# Patient Record
Sex: FEMALE | Race: WHITE | NOT HISPANIC OR LATINO | Employment: OTHER | URBAN - METROPOLITAN AREA
[De-identification: names, ages, dates, MRNs, and addresses within clinical notes are randomized per-mention and may not be internally consistent; named-entity substitution may affect disease eponyms.]

---

## 2017-02-17 ENCOUNTER — OFFICE VISIT (OUTPATIENT)
Dept: SURGERY | Facility: CLINIC | Age: 67
End: 2017-02-17

## 2017-02-17 VITALS
HEART RATE: 87 BPM | WEIGHT: 183 LBS | HEIGHT: 58 IN | OXYGEN SATURATION: 97 % | TEMPERATURE: 97.9 F | DIASTOLIC BLOOD PRESSURE: 78 MMHG | BODY MASS INDEX: 38.41 KG/M2 | RESPIRATION RATE: 20 BRPM | SYSTOLIC BLOOD PRESSURE: 126 MMHG

## 2017-02-17 DIAGNOSIS — R10.32 LEFT LOWER QUADRANT PAIN: ICD-10-CM

## 2017-02-17 DIAGNOSIS — R19.7 DIARRHEA, UNSPECIFIED TYPE: Primary | ICD-10-CM

## 2017-02-17 PROCEDURE — 99213 OFFICE O/P EST LOW 20 MIN: CPT | Performed by: COLON & RECTAL SURGERY

## 2017-02-17 RX ORDER — HYOSCYAMINE SULFATE 0.125 MG
125 TABLET ORAL EVERY 4 HOURS PRN
Qty: 60 TABLET | Refills: 1 | Status: SHIPPED | OUTPATIENT
Start: 2017-02-17 | End: 2017-08-01

## 2017-02-17 RX ORDER — ALLOPURINOL 300 MG/1
TABLET ORAL DAILY
COMMUNITY
Start: 2017-02-07

## 2017-02-17 RX ORDER — CHOLESTYRAMINE LIGHT 4 G/5.7G
4 POWDER, FOR SUSPENSION ORAL DAILY
Qty: 30 PACKET | Refills: 2 | Status: SHIPPED | OUTPATIENT
Start: 2017-02-17 | End: 2017-08-01

## 2017-02-17 NOTE — PROGRESS NOTES
"Sravanthi Burns is a 66 y.o. female in for follow up of Diarrhea, unspecified type    Left lower quadrant pain    Po c/o LLQ abd pain: \"feels like somebody takes an ice pick and stabs me as hard as they can\"  Last Friday lasted all day  Had temp to 99.4 all day  The next day, spot felt tender on the inside    Pt has not noted any correlation with foods    Better when she lays down; worse with movement    Pt cannot estimate how frequently abd pain occurs: states very sporadic    Has not been taking welchol: could not swallow pills    States diarrhea has been better    Does not like to take imodium, as she is concerned with becoming constipated    Today, stools are formed    Stools vacillate back and forth between formed and loose    On days when she has symptoms, has 5-10 loose BMs     Conversely, can go 3-4 days without a BM    STates she goes through this cycle every week    No blood    C/o lots of gas.  Has not tried gas-x    Cannot always tell if she is going to pass gas or have BM    Takes daily gummy fiber, which she states have helped    Has not seen gastroenterologist    Visit Vitals   • /78 (BP Location: Left arm, Patient Position: Sitting, Cuff Size: Adult)   • Pulse 87   • Temp 97.9 °F (36.6 °C)   • Resp 20   • Ht 58\" (147.3 cm)   • Wt 183 lb (83 kg)   • SpO2 97%   • BMI 38.25 kg/m2     Body mass index is 38.25 kg/(m^2).      PE:  Physical Exam   Constitutional: She appears well-developed. No distress.   HENT:   Head: Normocephalic and atraumatic.   Abdominal: Soft. She exhibits no distension.   Musculoskeletal: Normal range of motion.   Neurological: She is alert.   Psychiatric: Thought content normal.         Assessment:   1. Diarrhea, unspecified type    2. Left lower quadrant pain         Plan:    -Rx Levsin for IBD symptoms  -change welchol to rx cholestyramine: instructed pt to discuss timing of cholestyramine and thyroid medication with pharmacist  -call, come in, or go to ED if abd pain " returns or does not improve    RTC 2 months    Scribed for Josi Dela Cruz MD by Janie Ballard PA-C. 2/17/2017  11:47 AM  This patient was evaluated by me, recommendations made, documentation reviewed, edited, and revised by me, Josi Dela Cruz MD      EMR Dragon/Transcription disclaimer:   Much of this encounter note is an electronic transcription/translation of spoken language to printed text. The electronic translation of spoken language may permit erroneous, or at times, nonsensical words or phrases to be inadvertently transcribed; Although I have reviewed the note for such errors, some may still exist. Lasted all day 1 week ago

## 2017-02-20 ENCOUNTER — TRANSCRIBE ORDERS (OUTPATIENT)
Dept: LAB | Facility: HOSPITAL | Age: 67
End: 2017-02-20

## 2017-02-20 ENCOUNTER — LAB (OUTPATIENT)
Dept: LAB | Facility: HOSPITAL | Age: 67
End: 2017-02-20
Attending: ORTHOPAEDIC SURGERY

## 2017-02-20 ENCOUNTER — HOSPITAL ENCOUNTER (OUTPATIENT)
Dept: CARDIOLOGY | Facility: HOSPITAL | Age: 67
Discharge: HOME | End: 2017-02-20
Attending: ORTHOPAEDIC SURGERY | Admitting: ORTHOPAEDIC SURGERY

## 2017-02-20 DIAGNOSIS — Z98.890 S/P DEBRIDEMENT: ICD-10-CM

## 2017-02-20 DIAGNOSIS — Z01.818 PRE-OP TESTING: ICD-10-CM

## 2017-02-20 DIAGNOSIS — Z98.890 S/P DEBRIDEMENT: Primary | ICD-10-CM

## 2017-02-20 LAB
ALBUMIN SERPL-MCNC: 4.2 G/DL (ref 3.5–5.2)
ALBUMIN/GLOB SERPL: 1.3 G/DL
ALP SERPL-CCNC: 126 U/L (ref 39–117)
ALT SERPL W P-5'-P-CCNC: 32 U/L (ref 1–33)
ANION GAP SERPL CALCULATED.3IONS-SCNC: 14.8 MMOL/L
AST SERPL-CCNC: 27 U/L (ref 1–32)
BASOPHILS # BLD AUTO: 0.04 10*3/MM3 (ref 0–0.2)
BASOPHILS NFR BLD AUTO: 0.5 % (ref 0–1.5)
BILIRUB SERPL-MCNC: 0.5 MG/DL (ref 0.1–1.2)
BUN BLD-MCNC: 15 MG/DL (ref 8–23)
BUN/CREAT SERPL: 11.8 (ref 7–25)
CALCIUM SPEC-SCNC: 9.7 MG/DL (ref 8.6–10.5)
CHLORIDE SERPL-SCNC: 100 MMOL/L (ref 98–107)
CO2 SERPL-SCNC: 28.2 MMOL/L (ref 22–29)
CREAT BLD-MCNC: 1.27 MG/DL (ref 0.57–1)
DEPRECATED RDW RBC AUTO: 48.6 FL (ref 37–54)
EOSINOPHIL # BLD AUTO: 0 10*3/MM3 (ref 0–0.7)
EOSINOPHIL NFR BLD AUTO: 0 % (ref 0.3–6.2)
ERYTHROCYTE [DISTWIDTH] IN BLOOD BY AUTOMATED COUNT: 15 % (ref 11.7–13)
GFR SERPL CREATININE-BSD FRML MDRD: 42 ML/MIN/1.73
GLOBULIN UR ELPH-MCNC: 3.3 GM/DL
GLUCOSE BLD-MCNC: 108 MG/DL (ref 65–99)
HCT VFR BLD AUTO: 40.5 % (ref 35.6–45.5)
HGB BLD-MCNC: 14 G/DL (ref 11.9–15.5)
IMM GRANULOCYTES # BLD: 0.08 10*3/MM3 (ref 0–0.03)
IMM GRANULOCYTES NFR BLD: 0.9 % (ref 0–0.5)
LYMPHOCYTES # BLD AUTO: 2.55 10*3/MM3 (ref 0.9–4.8)
LYMPHOCYTES NFR BLD AUTO: 28.7 % (ref 19.6–45.3)
MCH RBC QN AUTO: 31.2 PG (ref 26.9–32)
MCHC RBC AUTO-ENTMCNC: 34.6 G/DL (ref 32.4–36.3)
MCV RBC AUTO: 90.2 FL (ref 80.5–98.2)
MONOCYTES # BLD AUTO: 0.98 10*3/MM3 (ref 0.2–1.2)
MONOCYTES NFR BLD AUTO: 11 % (ref 5–12)
NEUTROPHILS # BLD AUTO: 5.22 10*3/MM3 (ref 1.9–8.1)
NEUTROPHILS NFR BLD AUTO: 58.9 % (ref 42.7–76)
PLATELET # BLD AUTO: 446 10*3/MM3 (ref 140–500)
PMV BLD AUTO: 8.5 FL (ref 6–12)
POTASSIUM BLD-SCNC: 4.6 MMOL/L (ref 3.5–5.2)
PROT SERPL-MCNC: 7.5 G/DL (ref 6–8.5)
RBC # BLD AUTO: 4.49 10*6/MM3 (ref 3.9–5.2)
SODIUM BLD-SCNC: 143 MMOL/L (ref 136–145)
WBC NRBC COR # BLD: 8.87 10*3/MM3 (ref 4.5–10.7)

## 2017-02-20 PROCEDURE — 80053 COMPREHEN METABOLIC PANEL: CPT

## 2017-02-20 PROCEDURE — 85025 COMPLETE CBC W/AUTO DIFF WBC: CPT

## 2017-02-20 PROCEDURE — 36415 COLL VENOUS BLD VENIPUNCTURE: CPT

## 2017-02-20 PROCEDURE — 93005 ELECTROCARDIOGRAM TRACING: CPT | Performed by: ORTHOPAEDIC SURGERY

## 2017-02-20 PROCEDURE — 93010 ELECTROCARDIOGRAM REPORT: CPT | Performed by: INTERNAL MEDICINE

## 2017-07-14 ENCOUNTER — TRANSCRIBE ORDERS (OUTPATIENT)
Dept: ADMINISTRATIVE | Facility: HOSPITAL | Age: 67
End: 2017-07-14

## 2017-07-14 DIAGNOSIS — M53.3 CHRONIC RIGHT SI JOINT PAIN: Primary | ICD-10-CM

## 2017-07-14 DIAGNOSIS — G89.29 CHRONIC RIGHT SI JOINT PAIN: Primary | ICD-10-CM

## 2017-08-01 ENCOUNTER — HOSPITAL ENCOUNTER (OUTPATIENT)
Dept: GENERAL RADIOLOGY | Facility: HOSPITAL | Age: 67
Discharge: HOME | End: 2017-08-01

## 2017-08-01 ENCOUNTER — ANESTHESIA EVENT (OUTPATIENT)
Dept: PAIN MEDICINE | Facility: HOSPITAL | Age: 67
End: 2017-08-01

## 2017-08-01 ENCOUNTER — HOSPITAL ENCOUNTER (OUTPATIENT)
Dept: PAIN MEDICINE | Facility: HOSPITAL | Age: 67
Discharge: HOME | End: 2017-08-01
Attending: ORTHOPAEDIC SURGERY | Admitting: ORTHOPAEDIC SURGERY

## 2017-08-01 ENCOUNTER — TRANSCRIBE ORDERS (OUTPATIENT)
Dept: PAIN MEDICINE | Facility: HOSPITAL | Age: 67
End: 2017-08-01

## 2017-08-01 ENCOUNTER — ANESTHESIA (OUTPATIENT)
Dept: PAIN MEDICINE | Facility: HOSPITAL | Age: 67
End: 2017-08-01

## 2017-08-01 VITALS
OXYGEN SATURATION: 97 % | HEIGHT: 58 IN | HEART RATE: 82 BPM | RESPIRATION RATE: 16 BRPM | WEIGHT: 175 LBS | DIASTOLIC BLOOD PRESSURE: 63 MMHG | SYSTOLIC BLOOD PRESSURE: 136 MMHG | BODY MASS INDEX: 36.73 KG/M2 | TEMPERATURE: 98.1 F

## 2017-08-01 DIAGNOSIS — G89.29 CHRONIC RIGHT SI JOINT PAIN: Primary | ICD-10-CM

## 2017-08-01 DIAGNOSIS — M53.3 CHRONIC RIGHT SI JOINT PAIN: ICD-10-CM

## 2017-08-01 DIAGNOSIS — M53.3 CHRONIC RIGHT SI JOINT PAIN: Primary | ICD-10-CM

## 2017-08-01 DIAGNOSIS — G89.29 CHRONIC RIGHT SI JOINT PAIN: ICD-10-CM

## 2017-08-01 DIAGNOSIS — R52 PAIN: ICD-10-CM

## 2017-08-01 PROCEDURE — 25010000002 METHYLPREDNISOLONE PER 80 MG: Performed by: ANESTHESIOLOGY

## 2017-08-01 PROCEDURE — 77003 FLUOROGUIDE FOR SPINE INJECT: CPT

## 2017-08-01 PROCEDURE — 25010000002 MIDAZOLAM PER 1 MG: Performed by: ANESTHESIOLOGY

## 2017-08-01 PROCEDURE — 25010000002 FENTANYL CITRATE (PF) 100 MCG/2ML SOLUTION: Performed by: ANESTHESIOLOGY

## 2017-08-01 RX ORDER — FENTANYL CITRATE 50 UG/ML
50 INJECTION, SOLUTION INTRAMUSCULAR; INTRAVENOUS
Status: DISCONTINUED | OUTPATIENT
Start: 2017-08-01 | End: 2017-08-02 | Stop reason: HOSPADM

## 2017-08-01 RX ORDER — MIDAZOLAM HYDROCHLORIDE 1 MG/ML
1 INJECTION INTRAMUSCULAR; INTRAVENOUS
Status: DISCONTINUED | OUTPATIENT
Start: 2017-08-01 | End: 2017-08-02 | Stop reason: HOSPADM

## 2017-08-01 RX ORDER — SODIUM CHLORIDE 0.9 % (FLUSH) 0.9 %
1-10 SYRINGE (ML) INJECTION AS NEEDED
Status: DISCONTINUED | OUTPATIENT
Start: 2017-08-01 | End: 2017-08-02 | Stop reason: HOSPADM

## 2017-08-01 RX ORDER — BUPIVACAINE HYDROCHLORIDE 2.5 MG/ML
10 INJECTION, SOLUTION EPIDURAL; INFILTRATION; INTRACAUDAL ONCE
Status: COMPLETED | OUTPATIENT
Start: 2017-08-01 | End: 2017-08-01

## 2017-08-01 RX ORDER — METHYLPREDNISOLONE ACETATE 80 MG/ML
80 INJECTION, SUSPENSION INTRA-ARTICULAR; INTRALESIONAL; INTRAMUSCULAR; SOFT TISSUE ONCE
Status: COMPLETED | OUTPATIENT
Start: 2017-08-01 | End: 2017-08-01

## 2017-08-01 RX ORDER — LIDOCAINE HYDROCHLORIDE 10 MG/ML
1 INJECTION, SOLUTION INFILTRATION; PERINEURAL ONCE
Status: DISCONTINUED | OUTPATIENT
Start: 2017-08-01 | End: 2017-08-02 | Stop reason: HOSPADM

## 2017-08-01 RX ADMIN — METHYLPREDNISOLONE ACETATE 80 MG: 80 INJECTION, SUSPENSION INTRA-ARTICULAR; INTRALESIONAL; INTRAMUSCULAR; SOFT TISSUE at 08:48

## 2017-08-01 RX ADMIN — BUPIVACAINE HYDROCHLORIDE 10 ML: 2.5 INJECTION, SOLUTION EPIDURAL; INFILTRATION; INTRACAUDAL; PERINEURAL at 08:48

## 2017-08-01 RX ADMIN — FENTANYL CITRATE 50 MCG: 50 INJECTION INTRAMUSCULAR; INTRAVENOUS at 08:44

## 2017-08-01 RX ADMIN — MIDAZOLAM 1 MG: 1 INJECTION INTRAMUSCULAR; INTRAVENOUS at 08:44

## 2017-08-01 NOTE — H&P
[facility]    History and Physical    Patient Name: Sravanthi Burns  :  1950  MRN:  [id number]  Date of Admission: 2017    Subjective     Patient is a 66 y.o. female presents with chief complaint of chronic low back pain.  Onset of symptoms was gradual starting several years ago.  Symptoms are associated/aggravated by activity. Symptoms improve with lying down.  Pain in right buttock and back area, around right SI joint, at times will have numbness down right leg to the foot.  MRI in  showed LDDD.    The following portions of the patients history were reviewed and updated as appropriate: current medications, allergies, past medical history, past surgical history, past family history, past social history and problem list                Objective     Past Medical History:   Past Medical History:   Diagnosis Date   • Anxiety    • Chronic kidney disease    • Gout    • Hard to intubate    • Hypertension    • Postmenopausal HRT (hormone replacement therapy)    • Rosacea      Past Surgical History:   Past Surgical History:   Procedure Laterality Date   • CHOLECYSTECTOMY     • COLONOSCOPY N/A 10/4/2016    Procedure: COLONOSCOPY TO CECUM WITH HOT SNARE POLYPECTOMIES AND COLD BIOPSIES;  Surgeon: Josi Dela Cruz MD;  Location: [facility] ENDOSCOPY;  Service:    • COSMETIC SURGERY      ABDOMENOPLASTY   • COSMETIC SURGERY      ARM REDUCTION   • COSMETIC SURGERY      EYELID   • HERNIA REPAIR     • LAPAROSCOPIC GASTRIC BANDING       Family History:   Family History   Problem Relation Age of Onset   • Heart murmur Mother      Social History:   Social History   Substance Use Topics   • Smoking status: Former Smoker     Quit date:    • Smokeless tobacco: Never Used   • Alcohol use Yes      Comment: RARELY       Vital Signs Range for the last 24 hours  Temperature: Temp:  [36.7 °C (98.1 °F)] 36.7 °C (98.1 °F)   Temp Source: Temp src: Oral   BP: BP: (142)/(69) 142/69   Pulse: Heart Rate:  [76] 76   Respirations:  "Resp:  [16] 16   SPO2: SpO2:  [96 %] 96 %   O2 Amount (l/min):     O2 Devices O2 Device: room air   Weight: Weight:  [175 lb (79.4 kg)] 175 lb (79.4 kg)     Flowsheet Rows         First Filed Value    Admission Height  58.25\" (148 cm) Documented at 08/01/2017 0813    Admission Weight  175 lb (79.4 kg) Documented at 08/01/2017 0813          --------------------------------------------------------------------------------    Current Outpatient Prescriptions   Medication Sig Dispense Refill   • ALPRAZolam (XANAX) 0.25 MG tablet Take 1 tablet by mouth daily as needed.     • atenolol (TENORMIN) 50 MG tablet TAKE ONE TABLET BY MOUTH DAILY 30 tablet 0   • Calcium-Vitamin D-Vitamin K 500-500-40 MG-UNT-MCG chewable tablet Chew.     • furosemide (LASIX) 40 MG tablet      • imipramine (TOFRANIL) 50 MG tablet TAKE ONE TABLET BY MOUTH AT BEDTIME 30 tablet 0   • progesterone (PROMETRIUM) 100 MG capsule      • thyroid (ARMOUR) 60 MG tablet Take by mouth.     • valsartan (DIOVAN) 320 MG tablet Take by mouth.     • allopurinol (ZYLOPRIM) 300 MG tablet        Current Facility-Administered Medications   Medication Dose Route Frequency Provider Last Rate Last Dose   • sodium chloride 0.9 % flush 1-10 mL  1-10 mL Intravenous PRN Milad DO Mauricio           --------------------------------------------------------------------------------  Assessment/Plan      Anesthesia Evaluation     Patient summary reviewed and Nursing notes reviewed   history of anesthetic complications: difficult airway         Airway   Mallampati: III  TM distance: >3 FB  Neck ROM: full  possible difficult intubation and small opening  Dental - normal exam     Pulmonary - negative pulmonary ROS    breath sounds clear to auscultation  Cardiovascular - normal exam  Exercise tolerance: good (4-7 METS)    Rhythm: regular  Rate: normal    (+) hypertension,       Neuro/Psych- neuro exam normal  (+) psychiatric history Anxiety,    GI/Hepatic/Renal/Endo    (+) morbid obesity, " GERD, renal disease CRI, hypothyroidism,     Musculoskeletal (-) normal exam    (+) back pain,   Abdominal  - normal exam   Substance History - negative use     OB/GYN          Other   (+) arthritis                                Diagnosis and Plan    Treatment Plan  ASA 3      Procedures: With fluoroscopy,       Anesthetic plan and risks discussed with patient.      Right sacroiliac joint injection    Diagnosis     * Sacroiliac joint dysfunction of right side [M53.3]

## 2017-08-01 NOTE — ANESTHESIA PROCEDURE NOTES
PAIN SI joint injection    Patient location during procedure: Pain Clinic  Reason for block: procedure for pain  Performed by  Anesthesiologist: ANUSHKA FLORES  Preanesthetic Checklist  Completed: patient identified, site marked, surgical consent, pre-op evaluation, timeout performed, IV checked, risks and benefits discussed and monitors and equipment checked  Prep:  Patient position: supine  Sterile barriers:mask, gloves and cap  Prep: ChloraPrep  Patient monitoring: blood pressure monitoring, continuous pulse oximetry and EKG  Procedure:  Sedation:yes  Guidance:fluoroscopy  Contrast Medium:no  Location:Right SIJ  Needle Type:Tuohy  Needle Gauge:22 G  Aspiration:negative  Medications:  Depomedrol:80 mg  Comment:Bupivacaine 0.25% 10 ml    Post Assessment  Injection Assessment: negative  Patient Tolerance:comfortable throughout block  Complications:no  Additional Notes  Sacroilitis, right side

## 2017-08-15 ENCOUNTER — ANESTHESIA EVENT (OUTPATIENT)
Dept: PAIN MEDICINE | Facility: HOSPITAL | Age: 67
End: 2017-08-15

## 2017-08-15 ENCOUNTER — HOSPITAL ENCOUNTER (OUTPATIENT)
Dept: GENERAL RADIOLOGY | Facility: HOSPITAL | Age: 67
Discharge: HOME | End: 2017-08-15

## 2017-08-15 ENCOUNTER — ANESTHESIA (OUTPATIENT)
Dept: PAIN MEDICINE | Facility: HOSPITAL | Age: 67
End: 2017-08-15

## 2017-08-15 ENCOUNTER — HOSPITAL ENCOUNTER (OUTPATIENT)
Dept: PAIN MEDICINE | Facility: HOSPITAL | Age: 67
Discharge: HOME | End: 2017-08-15
Admitting: ORTHOPAEDIC SURGERY

## 2017-08-15 VITALS
WEIGHT: 175 LBS | DIASTOLIC BLOOD PRESSURE: 74 MMHG | OXYGEN SATURATION: 96 % | BODY MASS INDEX: 36.73 KG/M2 | TEMPERATURE: 98.1 F | RESPIRATION RATE: 16 BRPM | HEIGHT: 58 IN | SYSTOLIC BLOOD PRESSURE: 134 MMHG | HEART RATE: 78 BPM

## 2017-08-15 DIAGNOSIS — M53.3 CHRONIC RIGHT SI JOINT PAIN: ICD-10-CM

## 2017-08-15 DIAGNOSIS — R52 PAIN: ICD-10-CM

## 2017-08-15 DIAGNOSIS — G89.29 CHRONIC RIGHT SI JOINT PAIN: ICD-10-CM

## 2017-08-15 PROCEDURE — 25010000002 METHYLPREDNISOLONE PER 80 MG: Performed by: ANESTHESIOLOGY

## 2017-08-15 PROCEDURE — 25010000002 FENTANYL CITRATE (PF) 100 MCG/2ML SOLUTION: Performed by: ANESTHESIOLOGY

## 2017-08-15 PROCEDURE — 25010000002 MIDAZOLAM PER 1 MG: Performed by: ANESTHESIOLOGY

## 2017-08-15 RX ORDER — FENTANYL CITRATE 50 UG/ML
100 INJECTION, SOLUTION INTRAMUSCULAR; INTRAVENOUS AS NEEDED
Status: DISCONTINUED | OUTPATIENT
Start: 2017-08-15 | End: 2017-08-16 | Stop reason: HOSPADM

## 2017-08-15 RX ORDER — LIDOCAINE HYDROCHLORIDE 10 MG/ML
1 INJECTION, SOLUTION INFILTRATION; PERINEURAL ONCE
Status: DISCONTINUED | OUTPATIENT
Start: 2017-08-15 | End: 2017-08-16 | Stop reason: HOSPADM

## 2017-08-15 RX ORDER — MIDAZOLAM HYDROCHLORIDE 1 MG/ML
1 INJECTION INTRAMUSCULAR; INTRAVENOUS AS NEEDED
Status: DISCONTINUED | OUTPATIENT
Start: 2017-08-15 | End: 2017-08-16 | Stop reason: HOSPADM

## 2017-08-15 RX ORDER — METHYLPREDNISOLONE ACETATE 80 MG/ML
80 INJECTION, SUSPENSION INTRA-ARTICULAR; INTRALESIONAL; INTRAMUSCULAR; SOFT TISSUE ONCE
Status: COMPLETED | OUTPATIENT
Start: 2017-08-15 | End: 2017-08-15

## 2017-08-15 RX ORDER — SODIUM CHLORIDE 0.9 % (FLUSH) 0.9 %
1-10 SYRINGE (ML) INJECTION AS NEEDED
Status: DISCONTINUED | OUTPATIENT
Start: 2017-08-15 | End: 2017-08-16 | Stop reason: HOSPADM

## 2017-08-15 RX ORDER — BUPIVACAINE HYDROCHLORIDE 2.5 MG/ML
5 INJECTION, SOLUTION EPIDURAL; INFILTRATION; INTRACAUDAL AS NEEDED
Status: DISCONTINUED | OUTPATIENT
Start: 2017-08-15 | End: 2017-08-16 | Stop reason: HOSPADM

## 2017-08-15 RX ADMIN — FENTANYL CITRATE 50 MCG: 50 INJECTION INTRAMUSCULAR; INTRAVENOUS at 09:08

## 2017-08-15 RX ADMIN — BUPIVACAINE HYDROCHLORIDE 1 ML: 2.5 INJECTION, SOLUTION EPIDURAL; INFILTRATION; INTRACAUDAL at 09:12

## 2017-08-15 RX ADMIN — METHYLPREDNISOLONE ACETATE 80 MG: 80 INJECTION, SUSPENSION INTRA-ARTICULAR; INTRALESIONAL; INTRAMUSCULAR; SOFT TISSUE at 09:12

## 2017-08-15 RX ADMIN — MIDAZOLAM 1 MG: 1 INJECTION INTRAMUSCULAR; INTRAVENOUS at 09:08

## 2017-08-15 NOTE — INTERVAL H&P NOTE
[facility]  H&P reviewed. No changes since last visit.  Patient states   10-25% improvement since the last procedure/injection.    Diagnosis     * Sacroiliitis [M46.1]      Airway assessed since last visit. Airway class equals: 2.

## 2017-08-15 NOTE — H&P (VIEW-ONLY)
[facility]    History and Physical    Patient Name: Sravanthi Burns  :  1950  MRN:  [id number]  Date of Admission: 2017    Subjective     Patient is a 66 y.o. female presents with chief complaint of chronic low back pain.  Onset of symptoms was gradual starting several years ago.  Symptoms are associated/aggravated by activity. Symptoms improve with lying down.  Pain in right buttock and back area, around right SI joint, at times will have numbness down right leg to the foot.  MRI in  showed LDDD.    The following portions of the patients history were reviewed and updated as appropriate: current medications, allergies, past medical history, past surgical history, past family history, past social history and problem list                Objective     Past Medical History:   Past Medical History:   Diagnosis Date   • Anxiety    • Chronic kidney disease    • Gout    • Hard to intubate    • Hypertension    • Postmenopausal HRT (hormone replacement therapy)    • Rosacea      Past Surgical History:   Past Surgical History:   Procedure Laterality Date   • CHOLECYSTECTOMY     • COLONOSCOPY N/A 10/4/2016    Procedure: COLONOSCOPY TO CECUM WITH HOT SNARE POLYPECTOMIES AND COLD BIOPSIES;  Surgeon: Josi Dela Cruz MD;  Location: [facility] ENDOSCOPY;  Service:    • COSMETIC SURGERY      ABDOMENOPLASTY   • COSMETIC SURGERY      ARM REDUCTION   • COSMETIC SURGERY      EYELID   • HERNIA REPAIR     • LAPAROSCOPIC GASTRIC BANDING       Family History:   Family History   Problem Relation Age of Onset   • Heart murmur Mother      Social History:   Social History   Substance Use Topics   • Smoking status: Former Smoker     Quit date:    • Smokeless tobacco: Never Used   • Alcohol use Yes      Comment: RARELY       Vital Signs Range for the last 24 hours  Temperature: Temp:  [36.7 °C (98.1 °F)] 36.7 °C (98.1 °F)   Temp Source: Temp src: Oral   BP: BP: (142)/(69) 142/69   Pulse: Heart Rate:  [76] 76   Respirations:  "Resp:  [16] 16   SPO2: SpO2:  [96 %] 96 %   O2 Amount (l/min):     O2 Devices O2 Device: room air   Weight: Weight:  [175 lb (79.4 kg)] 175 lb (79.4 kg)     Flowsheet Rows         First Filed Value    Admission Height  58.25\" (148 cm) Documented at 08/01/2017 0813    Admission Weight  175 lb (79.4 kg) Documented at 08/01/2017 0813          --------------------------------------------------------------------------------    Current Outpatient Prescriptions   Medication Sig Dispense Refill   • ALPRAZolam (XANAX) 0.25 MG tablet Take 1 tablet by mouth daily as needed.     • atenolol (TENORMIN) 50 MG tablet TAKE ONE TABLET BY MOUTH DAILY 30 tablet 0   • Calcium-Vitamin D-Vitamin K 500-500-40 MG-UNT-MCG chewable tablet Chew.     • furosemide (LASIX) 40 MG tablet      • imipramine (TOFRANIL) 50 MG tablet TAKE ONE TABLET BY MOUTH AT BEDTIME 30 tablet 0   • progesterone (PROMETRIUM) 100 MG capsule      • thyroid (ARMOUR) 60 MG tablet Take by mouth.     • valsartan (DIOVAN) 320 MG tablet Take by mouth.     • allopurinol (ZYLOPRIM) 300 MG tablet        Current Facility-Administered Medications   Medication Dose Route Frequency Provider Last Rate Last Dose   • sodium chloride 0.9 % flush 1-10 mL  1-10 mL Intravenous PRN Milad DO Mauricio           --------------------------------------------------------------------------------  Assessment/Plan      Anesthesia Evaluation     Patient summary reviewed and Nursing notes reviewed   history of anesthetic complications: difficult airway         Airway   Mallampati: III  TM distance: >3 FB  Neck ROM: full  possible difficult intubation and small opening  Dental - normal exam     Pulmonary - negative pulmonary ROS    breath sounds clear to auscultation  Cardiovascular - normal exam  Exercise tolerance: good (4-7 METS)    Rhythm: regular  Rate: normal    (+) hypertension,       Neuro/Psych- neuro exam normal  (+) psychiatric history Anxiety,    GI/Hepatic/Renal/Endo    (+) morbid obesity, " GERD, renal disease CRI, hypothyroidism,     Musculoskeletal (-) normal exam    (+) back pain,   Abdominal  - normal exam   Substance History - negative use     OB/GYN          Other   (+) arthritis                                Diagnosis and Plan    Treatment Plan  ASA 3      Procedures: With fluoroscopy,       Anesthetic plan and risks discussed with patient.      Right sacroiliac joint injection    Diagnosis     * Sacroiliac joint dysfunction of right side [M53.3]

## 2017-08-15 NOTE — ANESTHESIA PROCEDURE NOTES
PAIN SI joint injection    Patient location during procedure: Pain Clinic  Reason for block: procedure for pain  Performed by  Anesthesiologist: SEBAS AMBROSIO  Preanesthetic Checklist  Completed: patient identified, site marked, surgical consent, pre-op evaluation, timeout performed, risks and benefits discussed and monitors and equipment checked  Prep:  Patient position: prone  Sterile barriers:cap, gloves, mask and sterile barrier  Prep: ChloraPrep  Patient monitoring: blood pressure monitoring, continuous pulse oximetry and EKG  Procedure:  Sedation:yes  Guidance:fluoroscopy  Contrast Medium:no  Location:Right SIJ  Needle Type:Quincke  Needle Gauge:25 G  Aspiration:negative  Medications:  Depomedrol:80 mg  Comment:1cc 0.25% bupivicaine    Diagnosis   Post-Op Diagnosis Codes:     * Sacroiliitis (M46.1)      Post Assessment  Injection Assessment: negative  Patient Tolerance:comfortable throughout block  Complications:no  Additional Notes  Intra articular right SIJI.

## 2017-08-30 ENCOUNTER — ANESTHESIA EVENT (OUTPATIENT)
Dept: PAIN MEDICINE | Facility: HOSPITAL | Age: 67
End: 2017-08-30

## 2017-08-30 ENCOUNTER — ANESTHESIA (OUTPATIENT)
Dept: PAIN MEDICINE | Facility: HOSPITAL | Age: 67
End: 2017-08-30

## 2017-08-30 ENCOUNTER — HOSPITAL ENCOUNTER (OUTPATIENT)
Dept: GENERAL RADIOLOGY | Facility: HOSPITAL | Age: 67
Discharge: HOME | End: 2017-08-30

## 2017-08-30 ENCOUNTER — HOSPITAL ENCOUNTER (OUTPATIENT)
Dept: PAIN MEDICINE | Facility: HOSPITAL | Age: 67
Discharge: HOME | End: 2017-08-30
Admitting: ORTHOPAEDIC SURGERY

## 2017-08-30 VITALS
DIASTOLIC BLOOD PRESSURE: 88 MMHG | RESPIRATION RATE: 16 BRPM | OXYGEN SATURATION: 98 % | SYSTOLIC BLOOD PRESSURE: 167 MMHG | HEART RATE: 76 BPM | TEMPERATURE: 98.2 F

## 2017-08-30 DIAGNOSIS — M53.3 CHRONIC RIGHT SI JOINT PAIN: ICD-10-CM

## 2017-08-30 DIAGNOSIS — R52 PAIN: ICD-10-CM

## 2017-08-30 DIAGNOSIS — G89.29 CHRONIC RIGHT SI JOINT PAIN: ICD-10-CM

## 2017-08-30 PROCEDURE — 25010000002 FENTANYL CITRATE (PF) 100 MCG/2ML SOLUTION: Performed by: ANESTHESIOLOGY

## 2017-08-30 PROCEDURE — C1755 CATHETER, INTRASPINAL: HCPCS

## 2017-08-30 PROCEDURE — 25010000002 METHYLPREDNISOLONE PER 80 MG: Performed by: ANESTHESIOLOGY

## 2017-08-30 PROCEDURE — 77003 FLUOROGUIDE FOR SPINE INJECT: CPT

## 2017-08-30 PROCEDURE — 25010000002 MIDAZOLAM PER 1 MG: Performed by: ANESTHESIOLOGY

## 2017-08-30 RX ORDER — METHYLPREDNISOLONE ACETATE 80 MG/ML
80 INJECTION, SUSPENSION INTRA-ARTICULAR; INTRALESIONAL; INTRAMUSCULAR; SOFT TISSUE ONCE
Status: COMPLETED | OUTPATIENT
Start: 2017-08-30 | End: 2017-08-30

## 2017-08-30 RX ORDER — FENTANYL CITRATE 50 UG/ML
50 INJECTION, SOLUTION INTRAMUSCULAR; INTRAVENOUS AS NEEDED
Status: DISCONTINUED | OUTPATIENT
Start: 2017-08-30 | End: 2017-08-31 | Stop reason: HOSPADM

## 2017-08-30 RX ORDER — MIDAZOLAM HYDROCHLORIDE 1 MG/ML
1 INJECTION INTRAMUSCULAR; INTRAVENOUS AS NEEDED
Status: DISCONTINUED | OUTPATIENT
Start: 2017-08-30 | End: 2017-08-31 | Stop reason: HOSPADM

## 2017-08-30 RX ORDER — LIDOCAINE HYDROCHLORIDE 10 MG/ML
1 INJECTION, SOLUTION INFILTRATION; PERINEURAL ONCE AS NEEDED
Status: DISCONTINUED | OUTPATIENT
Start: 2017-08-30 | End: 2017-08-31 | Stop reason: HOSPADM

## 2017-08-30 RX ORDER — SODIUM CHLORIDE 0.9 % (FLUSH) 0.9 %
1-10 SYRINGE (ML) INJECTION AS NEEDED
Status: DISCONTINUED | OUTPATIENT
Start: 2017-08-30 | End: 2017-08-31 | Stop reason: HOSPADM

## 2017-08-30 RX ADMIN — METHYLPREDNISOLONE ACETATE 80 MG: 80 INJECTION, SUSPENSION INTRA-ARTICULAR; INTRALESIONAL; INTRAMUSCULAR; SOFT TISSUE at 09:26

## 2017-08-30 RX ADMIN — FENTANYL CITRATE 50 MCG: 50 INJECTION INTRAMUSCULAR; INTRAVENOUS at 09:22

## 2017-08-30 RX ADMIN — MIDAZOLAM 1 MG: 1 INJECTION INTRAMUSCULAR; INTRAVENOUS at 09:22

## 2017-08-30 NOTE — ANESTHESIA PROCEDURE NOTES
PAIN SI joint injection    Patient location during procedure: Pain Clinic  Start time: 8/30/2017 9:28 AM  Reason for block: at surgeon's request  Performed by  Anesthesiologist: JACY, FABBY RAY  Preanesthetic Checklist  Completed: patient identified, site marked, surgical consent, pre-op evaluation, timeout performed, IV checked, risks and benefits discussed and monitors and equipment checked  Prep:  Patient position: prone  Sterile barriers:cap, gloves, mask and sterile barrier  Prep: ChloraPrep  Patient monitoring: blood pressure monitoring, continuous pulse oximetry and EKG  Procedure:  Sedation:yes  Guidance:fluoroscopy  Contrast Medium:no  Location:Right SIJ  Needle Gauge:22 G  Medications:  Depomedrol:80 mg  Comment:Right sacral iliac joint injection was performed under fluoroscopic guidance.  The circulation was well visualized by AP fluoroscopy with very slight medial to lateral tilt the C-arm.  There was no pain with injection.  No return of red blood cells.  She tolerated procedure well.

## 2018-08-09 ENCOUNTER — APPOINTMENT (OUTPATIENT)
Dept: WOMENS IMAGING | Facility: HOSPITAL | Age: 68
End: 2018-08-09

## 2018-08-09 PROCEDURE — 77080 DXA BONE DENSITY AXIAL: CPT | Performed by: RADIOLOGY

## 2018-08-09 PROCEDURE — 77067 SCR MAMMO BI INCL CAD: CPT | Performed by: RADIOLOGY

## 2018-08-09 PROCEDURE — MDREVIEWSP: Performed by: RADIOLOGY

## 2018-08-09 PROCEDURE — 77063 BREAST TOMOSYNTHESIS BI: CPT | Performed by: RADIOLOGY

## 2019-03-22 ENCOUNTER — OFFICE VISIT (OUTPATIENT)
Dept: SURGERY | Facility: CLINIC | Age: 69
End: 2019-03-22

## 2019-03-22 VITALS
HEART RATE: 89 BPM | HEIGHT: 58 IN | OXYGEN SATURATION: 97 % | DIASTOLIC BLOOD PRESSURE: 68 MMHG | SYSTOLIC BLOOD PRESSURE: 130 MMHG | TEMPERATURE: 98.1 F | BODY MASS INDEX: 37.93 KG/M2 | WEIGHT: 180.7 LBS

## 2019-03-22 DIAGNOSIS — Z86.010 HISTORY OF COLON POLYPS: ICD-10-CM

## 2019-03-22 DIAGNOSIS — E66.01 MORBIDLY OBESE (HCC): ICD-10-CM

## 2019-03-22 DIAGNOSIS — R10.32 LLQ ABDOMINAL PAIN: Primary | ICD-10-CM

## 2019-03-22 PROBLEM — K21.9 GASTROESOPHAGEAL REFLUX DISEASE: Status: ACTIVE | Noted: 2019-03-22

## 2019-03-22 PROBLEM — J98.4 DISEASE OF LUNG: Status: ACTIVE | Noted: 2019-03-22

## 2019-03-22 PROBLEM — E03.9 ACQUIRED HYPOTHYROIDISM: Status: ACTIVE | Noted: 2019-03-22

## 2019-03-22 PROBLEM — R00.0 TACHYCARDIA: Status: ACTIVE | Noted: 2019-03-22

## 2019-03-22 PROBLEM — E66.09 OBESITY DUE TO EXCESS CALORIES WITH SERIOUS COMORBIDITY: Status: ACTIVE | Noted: 2018-05-10

## 2019-03-22 PROBLEM — I10 HYPERTENSION: Status: ACTIVE | Noted: 2019-03-22

## 2019-03-22 PROBLEM — N18.9 CHRONIC KIDNEY DISEASE: Status: ACTIVE | Noted: 2019-03-22

## 2019-03-22 PROBLEM — E78.5 HYPERLIPIDEMIA: Status: ACTIVE | Noted: 2019-03-22

## 2019-03-22 PROBLEM — F41.0 PANIC DISORDER WITHOUT AGORAPHOBIA: Status: ACTIVE | Noted: 2019-03-22

## 2019-03-22 PROCEDURE — 99213 OFFICE O/P EST LOW 20 MIN: CPT | Performed by: COLON & RECTAL SURGERY

## 2019-03-22 RX ORDER — IRBESARTAN 300 MG/1
TABLET ORAL
COMMUNITY
Start: 2019-01-27 | End: 2019-10-09

## 2019-03-22 RX ORDER — ESOMEPRAZOLE MAGNESIUM 20 MG/1
20 FOR SUSPENSION ORAL DAILY
COMMUNITY
End: 2019-10-10 | Stop reason: HOSPADM

## 2019-03-22 RX ORDER — HYOSCYAMINE SULFATE 0.125 MG
125 TABLET ORAL EVERY 4 HOURS PRN
Qty: 30 TABLET | Refills: 1 | Status: SHIPPED | OUTPATIENT
Start: 2019-03-22 | End: 2019-10-09

## 2019-03-22 NOTE — PROGRESS NOTES
"Sravanthi Burns is a 68 y.o. female in for follow up of abdominal pain    Pt states for about 4 months, she has had LLQ cramping pain  Nothing makes it better    Pt had lysis of adhesions Nov 2018 with Dr. Omar Dela Cruz: unable to do gastric bypass due to adhesion of mesh to intestine  Ever since then, she has had the abdominal pain  She has not noted a correlation of pain with certain foods  Pressing on it makes it feel better    She states her BMs have changed from diarrhea to constipation  Better with dulcolax  She does not take a fiber supplement    She previously had hernia repair 2004 Dr. Terrell Colon    /68 (BP Location: Left arm, Patient Position: Sitting, Cuff Size: Adult)   Pulse 89   Temp 98.1 °F (36.7 °C) (Oral)   Ht 147.3 cm (58\")   Wt 82 kg (180 lb 11.2 oz)   LMP  (LMP Unknown)   SpO2 97%   Breastfeeding? No   BMI 37.77 kg/m²   Body mass index is 37.77 kg/m².      PE:  Physical Exam   Constitutional: She appears well-developed. No distress.   HENT:   Head: Normocephalic and atraumatic.   Abdominal:   -s/nt/nd  -well-healed upper abdomen horizontal incision   Musculoskeletal: Normal range of motion.   Neurological: She is alert.   Psychiatric: Thought content normal.         Assessment:   1. LLQ abdominal pain    2. History of colon polyps    3. Morbidly obese (CMS/HCC)         Plan:      Will order CT for further evaluation of abdominal pain.  Will call her with results.  Rx Levsin for symptomatic relief in the meantime.      She will be due for colonoscopy Oct 2019 for hx multiple polyps      Scribed for Josi Dela Cruz MD by Janie Ballard PA-C 3/22/2019  This patient was evaluated by me, recommendations made, documentation reviewed, edited, and revised by me, Josi Dela Cruz MD        "

## 2019-03-25 ENCOUNTER — HOSPITAL ENCOUNTER (OUTPATIENT)
Dept: CT IMAGING | Facility: HOSPITAL | Age: 69
Discharge: HOME | End: 2019-03-25
Admitting: PHYSICIAN ASSISTANT

## 2019-03-25 DIAGNOSIS — R10.32 LLQ ABDOMINAL PAIN: ICD-10-CM

## 2019-03-25 LAB — CREAT BLDA-MCNC: 1.2 MG/DL (ref 0.6–1.3)

## 2019-03-25 PROCEDURE — 0 DIATRIZOATE MEGLUMINE & SODIUM PER 1 ML: Performed by: PHYSICIAN ASSISTANT

## 2019-03-25 PROCEDURE — 74177 CT ABD & PELVIS W/CONTRAST: CPT

## 2019-03-25 PROCEDURE — 25010000002 IOPAMIDOL 61 % SOLUTION: Performed by: PHYSICIAN ASSISTANT

## 2019-03-25 PROCEDURE — 82565 ASSAY OF CREATININE: CPT

## 2019-03-25 RX ADMIN — IOPAMIDOL 85 ML: 612 INJECTION, SOLUTION INTRAVENOUS at 11:02

## 2019-03-25 RX ADMIN — DIATRIZOATE MEGLUMINE AND DIATRIZOATE SODIUM 30 ML: 660; 100 LIQUID ORAL; RECTAL at 11:02

## 2019-07-29 ENCOUNTER — PREP FOR SURGERY (OUTPATIENT)
Dept: OTHER | Facility: HOSPITAL | Age: 69
End: 2019-07-29

## 2019-07-29 DIAGNOSIS — Z86.010 HISTORY OF COLON POLYPS: Primary | ICD-10-CM

## 2019-08-02 PROBLEM — Z86.010 HISTORY OF COLON POLYPS: Status: ACTIVE | Noted: 2019-08-02

## 2019-08-02 PROBLEM — Z86.0100 HISTORY OF COLON POLYPS: Status: ACTIVE | Noted: 2019-08-02

## 2019-08-22 ENCOUNTER — APPOINTMENT (OUTPATIENT)
Dept: WOMENS IMAGING | Facility: HOSPITAL | Age: 69
End: 2019-08-22

## 2019-08-22 PROCEDURE — 77063 BREAST TOMOSYNTHESIS BI: CPT | Performed by: RADIOLOGY

## 2019-08-22 PROCEDURE — 77067 SCR MAMMO BI INCL CAD: CPT | Performed by: RADIOLOGY

## 2019-08-22 PROCEDURE — MDREVIEWSP: Performed by: RADIOLOGY

## 2019-10-09 RX ORDER — LEVOTHYROXINE SODIUM 0.05 MG/1
50 TABLET ORAL DAILY
COMMUNITY
End: 2023-01-17 | Stop reason: DRUGHIGH

## 2019-10-10 ENCOUNTER — ANESTHESIA (OUTPATIENT)
Dept: GASTROENTEROLOGY | Facility: HOSPITAL | Age: 69
End: 2019-10-10

## 2019-10-10 ENCOUNTER — ANESTHESIA EVENT (OUTPATIENT)
Dept: GASTROENTEROLOGY | Facility: HOSPITAL | Age: 69
End: 2019-10-10

## 2019-10-10 ENCOUNTER — HOSPITAL ENCOUNTER (OUTPATIENT)
Facility: HOSPITAL | Age: 69
Setting detail: HOSPITAL OUTPATIENT SURGERY
Discharge: HOME | End: 2019-10-10
Attending: COLON & RECTAL SURGERY | Admitting: COLON & RECTAL SURGERY

## 2019-10-10 VITALS
WEIGHT: 181 LBS | SYSTOLIC BLOOD PRESSURE: 145 MMHG | HEIGHT: 58 IN | OXYGEN SATURATION: 96 % | HEART RATE: 94 BPM | TEMPERATURE: 99 F | DIASTOLIC BLOOD PRESSURE: 68 MMHG | BODY MASS INDEX: 37.99 KG/M2 | RESPIRATION RATE: 14 BRPM

## 2019-10-10 DIAGNOSIS — Z86.010 HISTORY OF COLON POLYPS: ICD-10-CM

## 2019-10-10 PROCEDURE — 25010000002 PROPOFOL 10 MG/ML EMULSION: Performed by: STUDENT IN AN ORGANIZED HEALTH CARE EDUCATION/TRAINING PROGRAM

## 2019-10-10 PROCEDURE — 45380 COLONOSCOPY AND BIOPSY: CPT | Performed by: COLON & RECTAL SURGERY

## 2019-10-10 PROCEDURE — 88305 TISSUE EXAM BY PATHOLOGIST: CPT | Performed by: COLON & RECTAL SURGERY

## 2019-10-10 RX ORDER — PROPOFOL 10 MG/ML
VIAL (ML) INTRAVENOUS CONTINUOUS PRN
Status: DISCONTINUED | OUTPATIENT
Start: 2019-10-10 | End: 2019-10-10 | Stop reason: SURG

## 2019-10-10 RX ORDER — LIDOCAINE HYDROCHLORIDE 10 MG/ML
0.5 INJECTION, SOLUTION INFILTRATION; PERINEURAL ONCE AS NEEDED
Status: DISCONTINUED | OUTPATIENT
Start: 2019-10-10 | End: 2019-10-10 | Stop reason: HOSPADM

## 2019-10-10 RX ORDER — ESTRADIOL 0.1 MG/G
2 CREAM VAGINAL DAILY
COMMUNITY

## 2019-10-10 RX ORDER — LIDOCAINE HYDROCHLORIDE 20 MG/ML
INJECTION, SOLUTION INFILTRATION; PERINEURAL AS NEEDED
Status: DISCONTINUED | OUTPATIENT
Start: 2019-10-10 | End: 2019-10-10 | Stop reason: SURG

## 2019-10-10 RX ORDER — PROPOFOL 10 MG/ML
VIAL (ML) INTRAVENOUS AS NEEDED
Status: DISCONTINUED | OUTPATIENT
Start: 2019-10-10 | End: 2019-10-10 | Stop reason: SURG

## 2019-10-10 RX ORDER — SODIUM CHLORIDE 0.9 % (FLUSH) 0.9 %
10 SYRINGE (ML) INJECTION AS NEEDED
Status: DISCONTINUED | OUTPATIENT
Start: 2019-10-10 | End: 2019-10-10 | Stop reason: HOSPADM

## 2019-10-10 RX ORDER — VALSARTAN 160 MG/1
320 TABLET ORAL DAILY
COMMUNITY

## 2019-10-10 RX ORDER — SODIUM CHLORIDE 9 MG/ML
1000 INJECTION, SOLUTION INTRAVENOUS CONTINUOUS
Status: DISCONTINUED | OUTPATIENT
Start: 2019-10-10 | End: 2019-10-10 | Stop reason: HOSPADM

## 2019-10-10 RX ADMIN — PROPOFOL 75 MG: 10 INJECTION, EMULSION INTRAVENOUS at 16:26

## 2019-10-10 RX ADMIN — SODIUM CHLORIDE 1000 ML: 9 INJECTION, SOLUTION INTRAVENOUS at 15:22

## 2019-10-10 RX ADMIN — LIDOCAINE HYDROCHLORIDE 60 MG: 20 INJECTION, SOLUTION INFILTRATION; PERINEURAL at 16:25

## 2019-10-10 RX ADMIN — PROPOFOL 125 MCG/KG/MIN: 10 INJECTION, EMULSION INTRAVENOUS at 16:27

## 2019-10-10 RX ADMIN — PROPOFOL 50 MG: 10 INJECTION, EMULSION INTRAVENOUS at 16:30

## 2019-10-10 NOTE — ANESTHESIA PREPROCEDURE EVALUATION
" Anesthesia Evaluation     Patient summary reviewed and Nursing notes reviewed   history of anesthetic complications: difficult airway  NPO Solid Status: > 8 hours  NPO Liquid Status: > 2 hours           Airway   Mallampati: IV  TM distance: >3 FB  Neck ROM: full  No difficulty expected, Difficult intubation highly probable and Small opening  Dental - normal exam     Pulmonary    (+) sleep apnea,   Cardiovascular   Exercise tolerance: good (4-7 METS)    Rhythm: regular  Rate: normal    (+) hypertension, hyperlipidemia,       Neuro/Psych  (+) psychiatric history Anxiety,     GI/Hepatic/Renal/Endo    (+) obesity,  GERD,  renal disease CRI, hypothyroidism,     ROS Comment: Hx of colon polyps    Musculoskeletal     Abdominal     Abdomen: soft.   Substance History      OB/GYN          Other                        Anesthesia Plan    ASA 3     MAC     intravenous induction   Anesthetic plan, all risks, benefits, and alternatives have been provided, discussed and informed consent has been obtained with: patient.    Patient was told that if she is intubated in the future it would need to be \"awake\" and with \"fancy equipment\".   "

## 2019-10-10 NOTE — ANESTHESIA POSTPROCEDURE EVALUATION
Patient: Sravanthi Burns    Procedure Summary     Date:  10/10/19 Room / Location:  [facility] ENDOSCOPY 9 /  [facility]    Anesthesia Start:  1623 Anesthesia Stop:  1645    Procedure:  COLONOSCOPY to cecum with polypectomies (N/A ) Diagnosis:       History of colon polyps      (History of colon polyps [Z86.010])    Surgeon:  Josi Dela Cruz MD Provider:  Peña Mcgraw MD    Anesthesia Type:  MAC ASA Status:  3          Anesthesia Type: MAC  Last vitals  BP   122/65 (10/10/19 1642)   Temp   37.2 °C (99 °F) (10/10/19 1450)   Pulse   88 (10/10/19 1642)   Resp   14 (10/10/19 1450)     SpO2   93 % (10/10/19 1642)     Post Anesthesia Care and Evaluation    Patient location during evaluation: bedside  Patient participation: complete - patient participated  Level of consciousness: awake and alert  Pain score: 1  Pain management: adequate  Airway patency: patent  Anesthetic complications: No anesthetic complications  PONV Status: controlled  Cardiovascular status: blood pressure returned to baseline and acceptable  Respiratory status: acceptable  Hydration status: acceptable

## 2019-10-14 LAB
CYTO UR: NORMAL
LAB AP CASE REPORT: NORMAL
PATH REPORT.FINAL DX SPEC: NORMAL
PATH REPORT.GROSS SPEC: NORMAL

## 2020-07-22 ENCOUNTER — TRANSCRIBE ORDERS (OUTPATIENT)
Dept: ADMINISTRATIVE | Facility: HOSPITAL | Age: 70
End: 2020-07-22

## 2020-07-22 DIAGNOSIS — R31.29 MICROSCOPIC HEMATURIA: Primary | ICD-10-CM

## 2020-07-24 ENCOUNTER — HOSPITAL ENCOUNTER (OUTPATIENT)
Dept: ULTRASOUND IMAGING | Facility: HOSPITAL | Age: 70
Discharge: HOME | End: 2020-07-24
Admitting: INTERNAL MEDICINE

## 2020-07-24 DIAGNOSIS — R31.29 MICROSCOPIC HEMATURIA: ICD-10-CM

## 2020-07-24 PROCEDURE — 76775 US EXAM ABDO BACK WALL LIM: CPT

## 2020-08-11 ENCOUNTER — OFFICE VISIT (OUTPATIENT)
Dept: SLEEP MEDICINE | Facility: HOSPITAL | Age: 70
End: 2020-08-11

## 2020-08-11 VITALS
DIASTOLIC BLOOD PRESSURE: 58 MMHG | HEIGHT: 58 IN | BODY MASS INDEX: 35.73 KG/M2 | WEIGHT: 170.2 LBS | SYSTOLIC BLOOD PRESSURE: 134 MMHG | OXYGEN SATURATION: 97 % | HEART RATE: 92 BPM

## 2020-08-11 DIAGNOSIS — G47.10 HYPERSOMNOLENCE: Primary | ICD-10-CM

## 2020-08-11 DIAGNOSIS — E66.9 OBESITY (BMI 30-39.9): ICD-10-CM

## 2020-08-11 DIAGNOSIS — R06.83 SNORING: ICD-10-CM

## 2020-08-11 DIAGNOSIS — G47.33 OBSTRUCTIVE SLEEP APNEA: ICD-10-CM

## 2020-08-11 PROCEDURE — G0463 HOSPITAL OUTPT CLINIC VISIT: HCPCS

## 2020-08-11 NOTE — PROGRESS NOTES
[facility]  [address] [facility]  3RD FLOOR  [facility] [address]  [phone]    Referring Physician: Dr. Garcia  PCP: Tamara Garcia MD    Reason for consult:  Sleep disorders of daytime sleepiness    Sravanthi Burns is a 69 y.o.female was seen in the [facility] today. She has excessive sleepiness despite sleeping adequate hrs. She sleeps from 10pm to 9am. Ongoing 2-3 years. She wakes up tired and remains so all day. She has some snoring at night. No witnessed apneas. She is very tired but does not fall asleep during day, except intentionally. No recent weight change.  [place] Sleepiness Score: 5. Caffeine intake 4 per day. Alcohol intake 1 per week.    Sravanthi Burns  has a past medical history of Anxiety, Cardiomegaly, Cataract, Chronic kidney disease, Colon polyps, DDD (degenerative disc disease), lumbar, Disease of thyroid gland, Elevated cholesterol, Excessive sleepiness, GERD (gastroesophageal reflux disease), Gout, Hard to intubate, Hot flashes, HPV in female, Hyperlipidemia, Hypertension, Insomnia, Low back pain, Lumbar radiculopathy, Lung disease, DELIA (obstructive sleep apnea), Panic disorder, Postmenopausal HRT (hormone replacement therapy), Pulmonary nodules, Rosacea, and Tachycardia.     Current Medications:    Current Outpatient Medications:   •  allopurinol (ZYLOPRIM) 300 MG tablet, Daily., Disp: , Rfl:   •  atenolol (TENORMIN) 50 MG tablet, TAKE ONE TABLET BY MOUTH DAILY, Disp: 30 tablet, Rfl: 0  •  Calcium-Vitamin D-Vitamin K 500-500-40 MG-UNT-MCG chewable tablet, Chew., Disp: , Rfl:   •  Cholecalciferol (VITAMIN D3) 5000 units chewable tablet, Chew Daily., Disp: , Rfl:   •  estradiol (ESTRACE) 0.1 MG/GM vaginal cream, Insert 2 g into the vagina Daily. Estradiol-testosterone cream compounded, use daily, Disp: , Rfl:   •  furosemide (LASIX) 40 MG tablet, , Disp: , Rfl:   •  imipramine (TOFRANIL) 50 MG tablet, TAKE ONE TABLET BY MOUTH AT BEDTIME, Disp: 30  "tablet, Rfl: 0  •  levothyroxine (SYNTHROID, LEVOTHROID) 50 MCG tablet, Take 50 mcg by mouth Daily., Disp: , Rfl:   •  progesterone (PROMETRIUM) 100 MG capsule, 200 mg., Disp: , Rfl:   •  valsartan (DIOVAN) 160 MG tablet, Take 320 mg by mouth Daily., Disp: , Rfl:    also listed in Sleep Questionnaire.    FH: family history includes Alcohol abuse in her father; Basal cell carcinoma in her brother; Cancer in her brother, father, and maternal grandmother; Heart disease in her brother and mother; Heart murmur in her mother; Hyperlipidemia in her mother; Hypertension in her brother, mother, and sister; No Known Problems in her daughter and son; Suicidality in her paternal grandfather.  SH:  reports that she quit smoking about 28 years ago. Her smoking use included cigarettes. She has a 10.00 pack-year smoking history. She has never used smokeless tobacco. She reports that she drinks alcohol. She reports that she does not use drugs.    Pertinent Positive Review of Systems of cough, soa, anxiety, diarrhea  Rest of Review of Systems was negative as recorded in Sleep Questionnaire.        Vital Signs: /58 (BP Location: Left arm, Patient Position: Sitting)   Pulse 92   Ht 147.3 cm (58\")   Wt 77.2 kg (170 lb 3.2 oz)   LMP  (LMP Unknown)   SpO2 97%   BMI 35.57 kg/m²     Body mass index is 35.57 kg/m².       Tongue: large      Dentition: good       Pharynx: Posterior pharyngeal pillars are cannot see   Mallampatti: IV (only hard palate visible)        General: Alert. Cooperative. Well developed. No acute distress.             Head:  Normocephalic. Symmetrical. Atraumatic.              Eyes: Sclera clear. No icterus. PERRLA. Normal EOM.             Ears: No deformities. Normal hearing.             Nose: No septal deviation. No drainage.          Throat: No oral lesions. No thrush. Moist mucous membranes.    Chest Wall:  Normal shape. Symmetric expansion with respiration. No tenderness.             Neck:  Trachea " midline.           Lungs:  Clear to auscultation bilaterally. No wheezes. No rhonchi. No rales. Respirations regular, even and unlabored.            Heart:  Regular rhythm and normal rate. Normal S1 and S2. No murmur.     Abdomen:  Soft, non-tender and non-distended. Normal bowel sounds. No masses.  Extremities:  Moves all extremities well. No edema.           Pulses: Pulses palpable and equal bilaterally.               Skin: Dry. Intact. No bleeding. No rash.           Neuro: Moves all 4 extremities and cranial nerves grossly intact.  Psychiatric: Normal mood and affect.    Impression:  1. Hypersomnolence    2. Snoring    3. Obstructive sleep apnea    4. Obesity (BMI 30-39.9)        Plan:  Sravanthi reports excessive fatigue during the day.  I will start with a HST.  If she has significant sleep disordered breathing then treatment with a CPAP will be required.  If however no sleep disordered breathing is discovered then she requires an in lab polysomnogram study followed by [facility].  Patient reports that a previous sleep study done at [facility] did not show sleep disordered breathing.  She has light snoring but no witnessed apneas per her spouse.    This patient has typical features of obstructive sleep apnea with hypersomnolence snoring and apneas along with elevated BMI and classic oropharyngeal structure.  Likelihood of obstructive sleep apnea is high and a polysomnogram study will therefore be requested.      Possible diagnosis and pathophysiology of obstructive sleep apnea was discussed with the patient.  Health risks of untreated obstructive sleep apnea including cardiovascular risks were discussed.  Patient was cautioned about activities requiring full concentration especially driving at night or for longer distances, and until hypersomnolence is corrected.    Results of sleep testing will be reviewed to see if patient is a candidate for CPAP machine.  Alternatives to therapy include oral mandibular advancing device  (OMAD) were discussed. However OMAD is usually only beneficial in mild obstructive sleep apnea.  The benefit of weight loss in reducing severity of obstructive sleep apnea was discussed.  Patient would benefit from adhering to a strict diet to achieve ideal BMI.     Patient will follow up in this clinic after testing.    Thank you for allowing me to participate in your patient's care.    Rei Sheriff MD    Part of this note may be an electronic transcription/translation of spoken language to printed text using the Dragon Dictation System.

## 2020-08-27 ENCOUNTER — APPOINTMENT (OUTPATIENT)
Dept: SLEEP MEDICINE | Facility: HOSPITAL | Age: 70
End: 2020-08-27

## 2020-09-01 ENCOUNTER — HOSPITAL ENCOUNTER (OUTPATIENT)
Dept: SLEEP MEDICINE | Facility: HOSPITAL | Age: 70
End: 2020-09-01

## 2020-09-09 ENCOUNTER — APPOINTMENT (OUTPATIENT)
Dept: WOMENS IMAGING | Facility: HOSPITAL | Age: 70
End: 2020-09-09

## 2020-09-09 PROCEDURE — 77063 BREAST TOMOSYNTHESIS BI: CPT | Performed by: RADIOLOGY

## 2020-09-09 PROCEDURE — 77067 SCR MAMMO BI INCL CAD: CPT | Performed by: RADIOLOGY

## 2020-09-16 ENCOUNTER — HOSPITAL ENCOUNTER (OUTPATIENT)
Dept: SLEEP MEDICINE | Facility: HOSPITAL | Age: 70
Discharge: HOME | End: 2020-09-16
Admitting: INTERNAL MEDICINE

## 2020-09-16 DIAGNOSIS — G47.10 HYPERSOMNOLENCE: ICD-10-CM

## 2020-09-16 DIAGNOSIS — R06.83 SNORING: ICD-10-CM

## 2020-09-16 DIAGNOSIS — G47.33 OBSTRUCTIVE SLEEP APNEA: ICD-10-CM

## 2020-09-16 PROCEDURE — 95806 SLEEP STUDY UNATT&RESP EFFT: CPT

## 2020-09-28 ENCOUNTER — OFFICE VISIT (OUTPATIENT)
Dept: SLEEP MEDICINE | Facility: HOSPITAL | Age: 70
End: 2020-09-28

## 2020-09-28 VITALS
SYSTOLIC BLOOD PRESSURE: 129 MMHG | WEIGHT: 167.4 LBS | HEIGHT: 58 IN | DIASTOLIC BLOOD PRESSURE: 63 MMHG | BODY MASS INDEX: 35.14 KG/M2 | OXYGEN SATURATION: 95 % | HEART RATE: 91 BPM

## 2020-09-28 DIAGNOSIS — E66.9 OBESITY (BMI 30-39.9): ICD-10-CM

## 2020-09-28 DIAGNOSIS — G47.33 OBSTRUCTIVE SLEEP APNEA: Primary | ICD-10-CM

## 2020-09-28 PROCEDURE — G0463 HOSPITAL OUTPT CLINIC VISIT: HCPCS

## 2020-09-28 NOTE — PROGRESS NOTES
Pt mask fitted in lab with options of Dreamwear FF small being the best fit, then Dreamwisp small, and dreamwear gel pillow small.

## 2020-09-28 NOTE — PROGRESS NOTES
[facility]  Telephone: [phone] / Fax: [phone] [place]  Telephone: [phone] / Fax: [phone] Kisha Cleary    PCP: Tamara Garcia MD    Reason for visit: DELIA f/u    Sravanthi Burns is a 70 y.o.female  was last seen at [facility] sleep lab last month. We did a sleep study this month and it showed mild DELIA with overall AHI 9 and supine AHI 10. She is here today to review sleep study results and discuss treatment strategies. She goes to bed at 10pm- and is up at 9am. Her ESS is 3. She has history of invisilign braces and had spent a lot of time to re-align her teeth. She is not interested in dental appliance for treatment of sleep apnea. She has no objection trying a CPAP machine with smallest possible mask. She likes to sleep on her back, and is concerned that avoiding supine position for the entire night may not be possible.     SH- no tobacco, no alcohol, no energy drinks, 2 caffeine per day    ROS- +bloating, anxiety. Rest is negative.      Current Medications:    Current Outpatient Medications:   •  allopurinol (ZYLOPRIM) 300 MG tablet, Daily., Disp: , Rfl:   •  atenolol (TENORMIN) 50 MG tablet, TAKE ONE TABLET BY MOUTH DAILY, Disp: 30 tablet, Rfl: 0  •  Calcium-Vitamin D-Vitamin K 500-500-40 MG-UNT-MCG chewable tablet, Chew., Disp: , Rfl:   •  Cholecalciferol (VITAMIN D3) 5000 units chewable tablet, Chew Daily., Disp: , Rfl:   •  estradiol (ESTRACE) 0.1 MG/GM vaginal cream, Insert 2 g into the vagina Daily. Estradiol-testosterone cream compounded, use daily, Disp: , Rfl:   •  furosemide (LASIX) 40 MG tablet, , Disp: , Rfl:   •  imipramine (TOFRANIL) 50 MG tablet, TAKE ONE TABLET BY MOUTH AT BEDTIME, Disp: 30 tablet, Rfl: 0  •  levothyroxine (SYNTHROID, LEVOTHROID) 50 MCG tablet, Take 50 mcg by mouth Daily., Disp: , Rfl:   •  progesterone (PROMETRIUM) 100 MG capsule, 200 mg., Disp: , Rfl:   •  valsartan (DIOVAN) 160 MG tablet, Take 320 mg by mouth Daily., Disp: , Rfl:    also  "entered in Sleep Questionnaire    Patient  has a past medical history of Anxiety, Cardiomegaly, Cataract, Chronic kidney disease, Colon polyps, DDD (degenerative disc disease), lumbar, Disease of thyroid gland, Elevated cholesterol, Excessive sleepiness, GERD (gastroesophageal reflux disease), Gout, Hard to intubate, Hot flashes, HPV in female, Hyperlipidemia, Hypertension, Insomnia, Low back pain, Lumbar radiculopathy, Lung disease, DELIA (obstructive sleep apnea), Panic disorder, Postmenopausal HRT (hormone replacement therapy), Pulmonary nodules, Rosacea, and Tachycardia.    I have reviewed the Past Medical History, Past Surgical History, Social History and Family History.    Vital Signs /63   Pulse 91   Ht 147.3 cm (58\")   Wt 75.9 kg (167 lb 6.4 oz)   LMP  (LMP Unknown)   SpO2 95%   BMI 34.99 kg/m²  Body mass index is 34.99 kg/m².    General Alert and oriented. No acute distress noted   Pharynx/Throat Class IV Mallampati airway, large tongue, no evidence of redundant lateral pharyngeal tissue. No oral lesions. No thrush. Moist mucous membranes.   Head Normocephalic. Symmetrical. Atraumatic.    Nose No septal deviation. No drainage   Chest Wall Normal shape. Symmetric expansion with respiration. No tenderness.   Neck Trachea midline, no thyromegaly or adenopathy    Lungs Clear to auscultation bilaterally. No wheezes. No rhonchi. No rales. Respirations regular, even and unlabored.   Heart Regular rhythm and normal rate. Normal S1 and S2. No murmur   Abdomen Soft, non-tender and non-distended. Normal bowel sounds. No masses.   Extremities Moves all extremities well. No edema   Psychiatric Normal mood and affect.       Testing:  Study-September 2020-Diagnostic findings: The patient tolerated the home sleep testing with monitoring time of 622.8 minutes. The data obtained make this a technically adequate study. The apnea hypopneas index(AHI) was 9.5 per sleep hour.  The AHI during supine position was 10.5 " per sleep hour. Mean heart rate of 75.4 BPM.  Snoring was noted 26.9% of sleep time. Lowest oxygen saturation during the study was 83%. Saturation below 89% was noted for 3.2 mins.     Impression:  1. Obstructive sleep apnea    2. Obesity (BMI 30-39.9)        Plan  I reviewed DELIA pathophysiology and sleep study results with patient. We discussed adverse outcomes associated with untreated DELIA. I explained to patient how CPAP works to correct DELIA and how OMAD works as well. After our discussion patient agreed to initiate tx with auto CPAP. I will ask our staff to set up auto CPAP 5-20 cm through local DME. We looked at different masks, and she seemed to like the dreamwear nasal cushion the most.     She will return for f/u in 6-8 wks to see Dr. Sheriff. Patient was advised to call if does not hear from DME in next 7-10 days. I asked patient to use the machine at least 4 hours on at least 70% of the nights. Patient was advised to contact DME if he/she experiences any mask issues in first 30 days.      I appreciate the opportunity to participate in this patient's care.      QAMAR Braga  [facility]  Phone: [phone]

## 2020-09-29 ENCOUNTER — TELEPHONE (OUTPATIENT)
Dept: SLEEP MEDICINE | Facility: HOSPITAL | Age: 70
End: 2020-09-29

## 2020-09-30 ENCOUNTER — TRANSCRIBE ORDERS (OUTPATIENT)
Dept: DIABETES SERVICES | Facility: HOSPITAL | Age: 70
End: 2020-09-30

## 2020-09-30 DIAGNOSIS — E11.22 TYPE 2 DIABETES MELLITUS WITH CHRONIC KIDNEY DISEASE, WITHOUT LONG-TERM CURRENT USE OF INSULIN, UNSPECIFIED CKD STAGE (HCC): Primary | ICD-10-CM

## 2020-10-06 ENCOUNTER — HOSPITAL ENCOUNTER (OUTPATIENT)
Dept: DIABETES SERVICES | Facility: HOSPITAL | Age: 70
Setting detail: RECURRING SERIES
Discharge: HOME | End: 2020-10-06

## 2020-10-06 PROCEDURE — G0109 DIAB MANAGE TRN IND/GROUP: HCPCS

## 2020-10-13 ENCOUNTER — HOSPITAL ENCOUNTER (OUTPATIENT)
Dept: DIABETES SERVICES | Facility: HOSPITAL | Age: 70
Setting detail: RECURRING SERIES
Discharge: HOME | End: 2020-10-13

## 2020-10-13 PROCEDURE — G0108 DIAB MANAGE TRN  PER INDIV: HCPCS | Performed by: DIETITIAN, REGISTERED

## 2020-10-20 ENCOUNTER — HOSPITAL ENCOUNTER (OUTPATIENT)
Dept: DIABETES SERVICES | Facility: HOSPITAL | Age: 70
Setting detail: RECURRING SERIES
Discharge: HOME | End: 2020-10-20

## 2020-10-20 PROCEDURE — G0109 DIAB MANAGE TRN IND/GROUP: HCPCS

## 2020-10-28 ENCOUNTER — OFFICE VISIT (OUTPATIENT)
Dept: GASTROENTEROLOGY | Facility: CLINIC | Age: 70
End: 2020-10-28

## 2020-10-28 VITALS — WEIGHT: 166.4 LBS | HEIGHT: 58 IN | BODY MASS INDEX: 34.93 KG/M2

## 2020-10-28 DIAGNOSIS — R19.4 ALTERED BOWEL HABITS: ICD-10-CM

## 2020-10-28 DIAGNOSIS — R10.32 LLQ PAIN: Primary | ICD-10-CM

## 2020-10-28 PROCEDURE — 99203 OFFICE O/P NEW LOW 30 MIN: CPT | Performed by: INTERNAL MEDICINE

## 2020-10-28 NOTE — PROGRESS NOTES
Subjective   Chief Complaint   Patient presents with   • Abdominal Pain   • Diarrhea       Sravanthi Burns is a  70 y.o. female here for an initial visit for diarrhea and abdominal pain.     She reports intermittent llq pain - lasts a day when she has it.  She reports episodes of random urgent diarrhea.  No constipation.  These episodes happen about once a week.  She may skip a day or two and then may have to strain.   She also has some lower abdominal pain that lasts seconds at a time.     colonoscopy in 2016 with multiple polyps both hyperplastic and adenomatous as well as diverticulosis.      HPI  Past Medical History:   Diagnosis Date   • Anxiety    • Cardiomegaly     MILD   • Cataract    • Chronic kidney disease    • Colon polyps     FOLLOWED BY DR. LUCY HILL   • DDD (degenerative disc disease), lumbar    • Disease of thyroid gland     HYPOTHYROIDISM   • Elevated cholesterol    • Excessive sleepiness    • GERD (gastroesophageal reflux disease)    • Gout    • Hard to intubate    • Hot flashes    • HPV in female    • Hyperlipidemia    • Hypertension    • Insomnia    • Low back pain    • Lumbar radiculopathy    • Lung disease    • DELIA (obstructive sleep apnea)    • Panic disorder    • Postmenopausal HRT (hormone replacement therapy)     PROGESTERONE AND ESTRADIAL   • Pulmonary nodules     RIGHT MIDDLE LOBE   • Rosacea    • Tachycardia      Past Surgical History:   Procedure Laterality Date   • CHOLECYSTECTOMY N/A 03/12/2002    WITH CHOLANGIOGRAM, DR. LYNNETTE CHAVEZ AT [facility]   • COLONOSCOPY N/A 10/4/2016    4 SESSILE TUBULAR ADENOMA POLYPS IN HEPATIC FLEXURE, 1 SESSILE TUBULAR ADENOMA AND 1 HYPERPLASTIC POLYP IN RECTUM, 6 MM SESSILE HYPERPLASTIC POLYP IN SIGMOID, 2 SESSILE HYPERPLASTI POLYPS IN RECTUM, MULTIPLE SMALL AND LARGE DIVERTICULA, RESCOPE IN 3 YRS, DR. LUCY HILL AT [facility]   • COLONOSCOPY N/A 08/19/2003    ENTIRE COLON WNL, DR. ARIAS THOMASON AT [facility]   • COLONOSCOPY N/A 10/07/2008    MULTIPLE LARGE MOUTHED  DIVERTICULA IN SIGMOID, RESCOPE IN 5 YRS, DR. ARIAS THOMASON AT [facility]   • COLONOSCOPY N/A 06/21/2011    DIVERTICULOSIS, MILD COLONIC SPASM, RESCOPE IN 5 YRS, DR. ARIAS THOMASON AT [facility]   • COLONOSCOPY N/A 10/10/2019    5 MM HYPERPLASTIC POLYP IN CECUM, 5 MM HYPERPLASTIC POLYP IN ASCENDING, 5 MM HYPERPLASTIC POLYP IN TRANSVERSE, 5 MM HYPERPLASTIC POLYP IN DESCENDING, RESCOPE IN 3 YRS, DR. LUCY HILL AT [facility]   • COSMETIC SURGERY N/A 1995    ABDOMENOPLASTY AND LIPOSUCTION, DR. MAUREEN WATERHOUSE   • COSMETIC SURGERY Bilateral 1997    ARM REDUCTION, BRACHIOPLASTY, DR. MAUREEN WATERHOUSE   • COSMETIC SURGERY Bilateral     UPPER EYELID BLEPHAROPLASTY   • COSMETIC SURGERY N/A 10/13/2000    LIPECTOMY OF ABDOMINAL WALL, DR. MAUREEN WATERHOUSE   • CYSTOSCOPY N/A 03/19/2014    DR. JERMAINE MURRIETA AT [facility]   • DIAGNOSTIC LAPAROSCOPY N/A 06/04/2010    DEBRIDEMENT OF ABDOMINAL WALL, DR. SILVERIO AT [facility]   • DILATATION AND CURETTAGE N/A 02/2003   • ENDOSCOPY N/A 01/13/2006    REFLUX ESOPHAGITIS, OTHERWISE WNL, DR. JOSE J WELLS AT [facility]   • GASTRIC BANDING REMOVAL N/A 07/2011   • GASTRIC PORT CHANGE N/A     DR. SILVERIO AT [facility]   • HERNIA REPAIR N/A 03/12/2002    VENTRAL HERNIA REPAIR, DR. KAITLYNN CHAVEZ AT [facility]   • LAPAROSCOPIC GASTRIC BANDING N/A 02/21/2017    DR. SILVERIO AT [facility]   • LAPAROSCOPIC LYSIS OF ADHESIONS N/A 11/05/2018    DR. LORI HILL AT [facility]   • SHOULDER ARTHROSCOPY W/ LABRAL REPAIR Left     AND DEBRIDEMENT OF ROTATOR CUFF   • TENSION FREE VAGINAL TAPING WITH MINI ARC SLING N/A 03/19/2004    DR. JERMAINE MURRIETA AT [facility]   • TUBAL ABDOMINAL LIGATION Bilateral 1982       Current Outpatient Medications:   •  allopurinol (ZYLOPRIM) 300 MG tablet, Daily., Disp: , Rfl:   •  atenolol (TENORMIN) 50 MG tablet, TAKE ONE TABLET BY MOUTH DAILY, Disp: 30 tablet, Rfl: 0  •  Calcium-Vitamin D-Vitamin K 500-500-40 MG-UNT-MCG chewable tablet, Chew., Disp: , Rfl:   •  Cholecalciferol (VITAMIN  "D3) 5000 units chewable tablet, Chew Daily., Disp: , Rfl:   •  estradiol (ESTRACE) 0.1 MG/GM vaginal cream, Insert 2 g into the vagina Daily. Estradiol-testosterone cream compounded, use daily, Disp: , Rfl:   •  furosemide (LASIX) 40 MG tablet, , Disp: , Rfl:   •  imipramine (TOFRANIL) 50 MG tablet, TAKE ONE TABLET BY MOUTH AT BEDTIME, Disp: 30 tablet, Rfl: 0  •  levothyroxine (SYNTHROID, LEVOTHROID) 50 MCG tablet, Take 50 mcg by mouth Daily., Disp: , Rfl:   •  PROGESTERONE MICRONIZED PO, Take  by mouth., Disp: , Rfl:   •  valsartan (DIOVAN) 160 MG tablet, Take 320 mg by mouth Daily., Disp: , Rfl:   PRN Meds:.  Allergies   Allergen Reactions   • Other Dermatitis, Itching and Swelling     \"surgical glue\"   • Duloxetine Other (See Comments)     Other reaction(s): Decreased libido  DECREASED LIBIDO   • Erythromycin Nausea Only   • Formaldehyde Itching   • Minocycline Swelling     GENERALIZED   • Nsaids Other (See Comments)     Avoids due to CKD    • Tea Tree Oil Other (See Comments)     Found during allergy test   • Venlafaxine Other (See Comments)     Other reaction(s): Decreased libido  DECREASED LIBIDO.     Social History     Socioeconomic History   • Marital status:      Spouse name: Not on file   • Number of children: Not on file   • Years of education: Not on file   • Highest education level: Not on file   Tobacco Use   • Smoking status: Former Smoker     Packs/day: 0.50     Years: 20.00     Pack years: 10.00     Types: Cigarettes     Quit date:      Years since quittin.8   • Smokeless tobacco: Never Used   Substance and Sexual Activity   • Alcohol use: Yes     Comment: RARELY   • Drug use: No   • Sexual activity: Defer     Family History   Problem Relation Age of Onset   • Heart murmur Mother    • Hyperlipidemia Mother    • Heart disease Mother    • Hypertension Mother    • Alcohol abuse Father    • Cancer Father    • Liver disease Father    • Hypertension Sister    • Heart disease Brother    • " Basal cell carcinoma Brother    • Hypertension Brother    • Cancer Brother    • No Known Problems Daughter    • No Known Problems Son    • Cancer Maternal Grandmother    • Suicidality Paternal Grandfather      Review of Systems   Constitutional: Negative for appetite change and unexpected weight change.   Gastrointestinal: Positive for abdominal pain, constipation and diarrhea. Negative for blood in stool.   All other systems reviewed and are negative.    There were no vitals filed for this visit.      10/28/20  1300   Weight: 75.5 kg (166 lb 6.4 oz)       Objective   Physical Exam  Constitutional:       Appearance: Normal appearance. She is well-developed.   HENT:      Head: Normocephalic and atraumatic.   Eyes:      General: No scleral icterus.     Conjunctiva/sclera: Conjunctivae normal.   Neck:      Musculoskeletal: Normal range of motion and neck supple.   Pulmonary:      Effort: Pulmonary effort is normal.      Breath sounds: Normal breath sounds.   Abdominal:      General: There is no distension.      Palpations: Abdomen is soft.      Tenderness: There is no abdominal tenderness.   Skin:     General: Skin is warm and dry.   Neurological:      Mental Status: She is alert.   Psychiatric:         Mood and Affect: Mood normal.         Behavior: Behavior normal.       No radiology results for the last 7 days    Assessment/Plan   Diagnoses and all orders for this visit:    LLQ pain    Altered bowel habits    Other orders  -     PROGESTERONE MICRONIZED PO; Take  by mouth.      Plan:  · Discussed diet modification to control symptoms  · Start benefiber 1-2 times daily - may add miralax if not improving  · IBgard prn

## 2020-11-20 ENCOUNTER — OFFICE VISIT (OUTPATIENT)
Dept: SLEEP MEDICINE | Facility: HOSPITAL | Age: 70
End: 2020-11-20

## 2020-11-20 VITALS
DIASTOLIC BLOOD PRESSURE: 57 MMHG | SYSTOLIC BLOOD PRESSURE: 136 MMHG | WEIGHT: 166 LBS | OXYGEN SATURATION: 97 % | BODY MASS INDEX: 34.85 KG/M2 | HEART RATE: 69 BPM | HEIGHT: 58 IN

## 2020-11-20 DIAGNOSIS — E66.9 OBESITY (BMI 30-39.9): ICD-10-CM

## 2020-11-20 DIAGNOSIS — G47.33 OBSTRUCTIVE SLEEP APNEA: Primary | ICD-10-CM

## 2020-11-20 PROCEDURE — G0463 HOSPITAL OUTPT CLINIC VISIT: HCPCS

## 2020-11-20 NOTE — PROGRESS NOTES
[facility] SLEEP MEDICINE  [address] [facility]  3RD FLOOR  [facility] [address]  [phone]    PCP: Tamara Garcia MD    Reason for visit:  Sleep disorders: DELIA    Sravanthi is a 70 y.o.female who was seen in the Sleep Disorders Center today. She is here to review results of CPAP. She finds it comfortable and uses a Dreamwear nasal cushion. No air leaks or dry mouth. She sleeps from 10pm to 9am. Notices no change but no longer needs to take naps... Takes imipramine for anxiety. Dx with diabetes in past summer.  Ellsworth Sleepiness Scale is 0. Caffeine 2 per day. Alcohol 0 per week.    Sravanthi  reports that she quit smoking about 28 years ago. Her smoking use included cigarettes. She has a 10.00 pack-year smoking history. She has never used smokeless tobacco.    Pertinent Positive Review of Systems of denies  Rest of Review of Systems was negative as recorded in Sleep Questionnaire.    Patient  has a past medical history of Anxiety, Cardiomegaly, Cataract, Chronic kidney disease, Colon polyps, DDD (degenerative disc disease), lumbar, Disease of thyroid gland, Elevated cholesterol, Excessive sleepiness, GERD (gastroesophageal reflux disease), Gout, Hard to intubate, Hot flashes, HPV in female, Hyperlipidemia, Hypertension, Insomnia, Low back pain, Lumbar radiculopathy, Lung disease, DELIA (obstructive sleep apnea), Panic disorder, Postmenopausal HRT (hormone replacement therapy), Pulmonary nodules, Rosacea, and Tachycardia.     Current Medications:    Current Outpatient Medications:   •  allopurinol (ZYLOPRIM) 300 MG tablet, Daily., Disp: , Rfl:   •  atenolol (TENORMIN) 50 MG tablet, TAKE ONE TABLET BY MOUTH DAILY, Disp: 30 tablet, Rfl: 0  •  Calcium-Vitamin D-Vitamin K 500-500-40 MG-UNT-MCG chewable tablet, Chew., Disp: , Rfl:   •  Cholecalciferol (VITAMIN D3) 5000 units chewable tablet, Chew Daily., Disp: , Rfl:   •  estradiol (ESTRACE) 0.1 MG/GM vaginal cream, Insert 2 g into the vagina Daily.  "Estradiol-testosterone cream compounded, use daily, Disp: , Rfl:   •  furosemide (LASIX) 40 MG tablet, , Disp: , Rfl:   •  imipramine (TOFRANIL) 50 MG tablet, TAKE ONE TABLET BY MOUTH AT BEDTIME, Disp: 30 tablet, Rfl: 0  •  levothyroxine (SYNTHROID, LEVOTHROID) 50 MCG tablet, Take 50 mcg by mouth Daily., Disp: , Rfl:   •  PROGESTERONE MICRONIZED PO, Take  by mouth., Disp: , Rfl:   •  valsartan (DIOVAN) 160 MG tablet, Take 320 mg by mouth Daily., Disp: , Rfl:    also entered in Sleep Questionnaire         Vital Signs: /57   Pulse 69   Ht 147.3 cm (58\")   Wt 75.3 kg (166 lb)   LMP  (LMP Unknown)   SpO2 97%   BMI 34.69 kg/m²     Body mass index is 34.69 kg/m².       Tongue: Large       Dentition: good       Pharynx: Posterior pharyngeal pillars are unable to see   Mallampatti: IV (only hard palate visible)        General: Alert. Cooperative. Well developed. No acute distress.             Head:  Normocephalic. Symmetrical. Atraumatic.              Nose: No septal deviation. No drainage.          Throat: No oral lesions. No thrush. Moist mucous membranes.    Chest Wall:  Normal shape. Symmetric expansion with respiration. No tenderness.             Neck:  Trachea midline.           Lungs:  Clear to auscultation bilaterally. No wheezes. No rhonchi. No rales. Respirations regular, even and unlabored.            Heart:  Regular rhythm and normal rate. Normal S1 and S2. No murmur.     Abdomen:  Soft, non-tender and non-distended. Normal bowel sounds. No masses.  Extremities:  Moves all extremities well. No edema.    Psychiatric: Normal mood and affect.    Study:  · 9/17/20  The patient tolerated the home sleep testing with monitoring time of 622.8 minutes. The data obtained make this a technically adequate study. The apnea hypopneas index(AHI) was 9.5 per sleep hour.  The AHI during supine position was 10.5 per sleep hour. Mean heart rate of 75.4 BPM.  Snoring was noted 26.9% of sleep time. Lowest oxygen " saturation during the study was 83%. Saturation below 89% was noted for 3.2 mins.     Testing:  · Compliance with device is excellent since set up.  Average usage 9 hours 12 minutes.  AHI 0.4.  Average pressure 10.2.  Auto CPAP between 5 and 20 cm.    DME Company: [employer]    Impression:  1. Obstructive sleep apnea    2. Obesity (BMI 30-39.9)        Plan:  Sravanthi is doing well with the CPAP.  She wakes up rested and refreshed and finds it beneficial.  I reminded her to change supplies regularly.  Any problems with the machine on pressures she was asked to give us a call.    I reiterated the importance of effective treatment of obstructive sleep apnea with PAP machine.  Cardiovascular health risks of untreated sleep apnea were again reviewed.  Patient was asked to remain cautious if there is persistent hypersomnolence. The benefit of weight loss in reducing severity of obstructive sleep apnea was discussed.  Patient would benefit from adhering to a strict diet to achieve ideal BMI.     Change of PAP supplies regularly is important for effective use.  Change of cushion on the mask or plugs on nasal pillows along with disposable filters once every month and change of mask frame, tubing, headgear and Velcro straps every 6 months at the minimum was reiterated.    This patient is compliant with PAP machine and benefits from its use.  Apnea hypopneas index is corrected/improved.  Daytime hypersomnolence has resolved.     Patient will follow up in this clinic in 6 months  APRN     Thank you for allowing me to participate in your patient's care.    Electronically signed by Rei Sheriff MD, 11/20/20, 11:08 AM EST.    Part of this note may be an electronic transcription/translation of spoken language to printed text using the Dragon Dictation System.

## 2021-03-19 ENCOUNTER — BULK ORDERING (OUTPATIENT)
Dept: CASE MANAGEMENT | Facility: OTHER | Age: 71
End: 2021-03-19

## 2021-03-19 DIAGNOSIS — Z23 IMMUNIZATION DUE: ICD-10-CM

## 2021-05-21 ENCOUNTER — OFFICE VISIT (OUTPATIENT)
Dept: SLEEP MEDICINE | Facility: HOSPITAL | Age: 71
End: 2021-05-21

## 2021-05-21 VITALS
DIASTOLIC BLOOD PRESSURE: 73 MMHG | HEART RATE: 83 BPM | HEIGHT: 58 IN | BODY MASS INDEX: 37.16 KG/M2 | OXYGEN SATURATION: 99 % | SYSTOLIC BLOOD PRESSURE: 149 MMHG | WEIGHT: 177 LBS

## 2021-05-21 DIAGNOSIS — Z78.9 DIFFICULTY WITH CPAP NASAL MASK USE: ICD-10-CM

## 2021-05-21 DIAGNOSIS — E66.9 OBESITY (BMI 30-39.9): ICD-10-CM

## 2021-05-21 DIAGNOSIS — G47.33 OBSTRUCTIVE SLEEP APNEA: Primary | ICD-10-CM

## 2021-05-21 PROCEDURE — G0463 HOSPITAL OUTPT CLINIC VISIT: HCPCS

## 2021-05-21 NOTE — PROGRESS NOTES
[facility] Disorders [facility]  Telephone: [phone] / Fax: [phone] [place]  Telephone: [phone] / Fax: [phone] Kisha Cleary    PCP: Tamara Garcia MD    Reason for visit: DELIA f/u    Sravanthi Burns is a 70 y.o.female  was last seen at [facility] sleep lab in November 2020. She can hear the air whistling if she sleeps on her side with DreamWear nasal cushion. The headgear slides down from her head. When she hooks the hose in the back of her head, it leaks. It has been going on for past 3-4 months. She is a side sleeper. This has compromised the quality of her sleep. She otherwise benefits from the machine and uses it regardless of discomfort.  Her sleep schedule is 10pm-8am. ESS is 3.    SH- no tobacco, drinks 2 alc per week, 2 caffeine per day, no energy drinks    ROS- +bloading, +anxiety, rest is negative.    DME [employer]     Current Medications:    Current Outpatient Medications:   •  allopurinol (ZYLOPRIM) 300 MG tablet, Daily., Disp: , Rfl:   •  atenolol (TENORMIN) 50 MG tablet, TAKE ONE TABLET BY MOUTH DAILY, Disp: 30 tablet, Rfl: 0  •  Calcium-Vitamin D-Vitamin K 500-500-40 MG-UNT-MCG chewable tablet, Chew., Disp: , Rfl:   •  Cholecalciferol (VITAMIN D3) 5000 units chewable tablet, Chew Daily., Disp: , Rfl:   •  estradiol (ESTRACE) 0.1 MG/GM vaginal cream, Insert 2 g into the vagina Daily. Estradiol-testosterone cream compounded, use daily, Disp: , Rfl:   •  furosemide (LASIX) 40 MG tablet, , Disp: , Rfl:   •  imipramine (TOFRANIL) 50 MG tablet, TAKE ONE TABLET BY MOUTH AT BEDTIME, Disp: 30 tablet, Rfl: 0  •  levothyroxine (SYNTHROID, LEVOTHROID) 50 MCG tablet, Take 50 mcg by mouth Daily., Disp: , Rfl:   •  PROGESTERONE MICRONIZED PO, Take  by mouth., Disp: , Rfl:   •  valsartan (DIOVAN) 160 MG tablet, Take 320 mg by mouth Daily., Disp: , Rfl:    also entered in Sleep Questionnaire    Patient  has a past medical history of Anxiety, Cardiomegaly, Cataract, Chronic kidney disease, Colon  "polyps, DDD (degenerative disc disease), lumbar, Disease of thyroid gland, Elevated cholesterol, Excessive sleepiness, GERD (gastroesophageal reflux disease), Gout, Hard to intubate, Hot flashes, HPV in female, Hyperlipidemia, Hypertension, Insomnia, Low back pain, Lumbar radiculopathy, Lung disease, DELIA (obstructive sleep apnea), Panic disorder, Postmenopausal HRT (hormone replacement therapy), Pulmonary nodules, Rosacea, and Tachycardia.    I have reviewed the Past Medical History, Past Surgical History, Social History and Family History.    Vital Signs /73   Pulse 83   Ht 147.3 cm (58\")   Wt 80.3 kg (177 lb)   LMP  (LMP Unknown)   SpO2 99%   BMI 36.99 kg/m²  Body mass index is 36.99 kg/m².    General Alert and oriented. No acute distress noted   Pharynx/Throat Class IV   Mallampati airway, large tongue, no evidence of redundant lateral pharyngeal tissue. No oral lesions. No thrush. Moist mucous membranes.   Head Normocephalic. Symmetrical. Atraumatic.    Nose No septal deviation. No drainage   Chest Wall Normal shape. Symmetric expansion with respiration. No tenderness.   Neck Trachea midline, no thyromegaly or adenopathy    Lungs Clear to auscultation bilaterally. No wheezes. No rhonchi. No rales. Respirations regular, even and unlabored.   Heart Regular rhythm and normal rate. Normal S1 and S2. No murmur   Abdomen Soft, non-tender and non-distended. Normal bowel sounds. No masses.   Extremities Moves all extremities well. No edema   Psychiatric Normal mood and affect.     Testing:  · Download 2/19/21-5/19/21-98% use with average nightly use of 7 hours and 2 minutes on auto CPAP 5-20cm with average pressure of 12.8cm, AHI 0.4, leak of 2.5 L/min.    Study-Testing:  Study-September 2020-Diagnostic findings: The patient tolerated the home sleep testing with monitoring time of 622.8 minutes. The data obtained make this a technically adequate study. The apnea hypopneas index(AHI) was 9.5 per sleep hour. "  The AHI during supine position was 10.5 per sleep hour. Mean heart rate of 75.4 BPM.  Snoring was noted 26.9% of sleep time. Lowest oxygen saturation during the study was 83%. Saturation below 89% was noted for 3.2 mins.     Impression:  1. Obstructive sleep apnea    2. Obesity (BMI 30-39.9)          Plan:  Sravanthi reports difficulty using DreamWear nasal cushion mask. I showed her several different styles today. She seemed to like the more secure fit of Air Fit N30 style, and I asked our sleep tech to get her fitted with this mask. I have also reduced the pressures to 5-14cm as she reports excessive leak. If pressure feel too low, she was asked to call us. Otherwise she will f/u with Dr Sheriff in 1 year    Weight loss will be strongly beneficial to reduce the severity of sleep-disordered breathing.  Caution during activities that require prolonged concentration is strongly advised if sleepiness returns. Changing of PAP supplies regularly is important for effective use.      Follow up with Dr. Sheriff in one year    Thank you for allowing me to participate in your patient's care.      QAMAR Braga  [facility]  Phone: [phone]      Part of this note may be an electronic transcription/translation of spoken language to printed text using the Dragon Dictation System.

## 2022-05-06 ENCOUNTER — APPOINTMENT (OUTPATIENT)
Dept: SLEEP MEDICINE | Facility: HOSPITAL | Age: 72
End: 2022-05-06

## 2022-05-16 ENCOUNTER — APPOINTMENT (OUTPATIENT)
Dept: SLEEP MEDICINE | Facility: HOSPITAL | Age: 72
End: 2022-05-16

## 2022-05-23 ENCOUNTER — TRANSCRIBE ORDERS (OUTPATIENT)
Dept: ADMINISTRATIVE | Facility: HOSPITAL | Age: 72
End: 2022-05-23

## 2022-05-23 ENCOUNTER — OFFICE VISIT (OUTPATIENT)
Dept: SLEEP MEDICINE | Facility: HOSPITAL | Age: 72
End: 2022-05-23

## 2022-05-23 VITALS
OXYGEN SATURATION: 95 % | BODY MASS INDEX: 37.49 KG/M2 | DIASTOLIC BLOOD PRESSURE: 72 MMHG | HEIGHT: 58 IN | SYSTOLIC BLOOD PRESSURE: 151 MMHG | HEART RATE: 119 BPM | WEIGHT: 178.6 LBS

## 2022-05-23 DIAGNOSIS — R10.32 LEFT LOWER QUADRANT ABDOMINAL PAIN: Primary | ICD-10-CM

## 2022-05-23 DIAGNOSIS — M79.662 PAIN OF LEFT CALF: ICD-10-CM

## 2022-05-23 DIAGNOSIS — R09.89 SUSPECTED DVT (DEEP VEIN THROMBOSIS): ICD-10-CM

## 2022-05-23 DIAGNOSIS — E66.9 OBESITY (BMI 30-39.9): ICD-10-CM

## 2022-05-23 DIAGNOSIS — G47.33 OBSTRUCTIVE SLEEP APNEA: Primary | ICD-10-CM

## 2022-05-23 PROCEDURE — G0463 HOSPITAL OUTPT CLINIC VISIT: HCPCS

## 2022-05-23 NOTE — PROGRESS NOTES
[facility] Sleep Disorders Center  Telephone: [phone] / Fax: [phone] [place]  Telephone: [phone] / Fax: [phone] Kisha Cleary    PCP: Tamara Garcia MD    Reason for visit: DELIA f/u    Sravanthi Burns is a 71 y.o.female  was last seen at [facility] sleep lab 1 year ago. She has not used the machine for past month as she started sleeping in a different bedroom, and has not yet transfered the CPAP over.  She understands the need to resume the machine and use it nightly. When she uses the machine, she wakes up feeling rested. We changed her pressures to 5-14cm H2O last visit and  recommended Air Fit N30 mask style. The air whistling is gone. New headgear no longer slides down her head. She reports some dryness in the nose, tough it is not significant.    SH- no tobacco, 0-1 alc per week, 2 caffeine per day, no energy drinks.    ROS- +GERD, +anxiety, rest is negative.    [facility]    Current Medications:    Current Outpatient Medications:   •  allopurinol (ZYLOPRIM) 300 MG tablet, Daily., Disp: , Rfl:   •  atenolol (TENORMIN) 50 MG tablet, TAKE ONE TABLET BY MOUTH DAILY, Disp: 30 tablet, Rfl: 0  •  Calcium-Vitamin D-Vitamin K 500-500-40 MG-UNT-MCG chewable tablet, Chew., Disp: , Rfl:   •  Cholecalciferol (VITAMIN D3) 5000 units chewable tablet, Chew Daily., Disp: , Rfl:   •  estradiol (ESTRACE) 0.1 MG/GM vaginal cream, Insert 2 g into the vagina Daily. Estradiol-testosterone cream compounded, use daily, Disp: , Rfl:   •  furosemide (LASIX) 40 MG tablet, , Disp: , Rfl:   •  imipramine (TOFRANIL) 50 MG tablet, TAKE ONE TABLET BY MOUTH AT BEDTIME, Disp: 30 tablet, Rfl: 0  •  levothyroxine (SYNTHROID, LEVOTHROID) 50 MCG tablet, Take 50 mcg by mouth Daily., Disp: , Rfl:   •  PROGESTERONE MICRONIZED PO, Take  by mouth., Disp: , Rfl:   •  valsartan (DIOVAN) 160 MG tablet, Take 320 mg by mouth Daily., Disp: , Rfl:    also entered in Sleep Questionnaire    Patient  has a past medical history of Anxiety,  "Cardiomegaly, Cataract, Chronic kidney disease, Colon polyps, DDD (degenerative disc disease), lumbar, Disease of thyroid gland, Elevated cholesterol, Excessive sleepiness, GERD (gastroesophageal reflux disease), Gout, Hard to intubate, Hot flashes, HPV in female, Hyperlipidemia, Hypertension, Insomnia, Low back pain, Lumbar radiculopathy, Lung disease, DELIA (obstructive sleep apnea), Panic disorder, Postmenopausal HRT (hormone replacement therapy), Pulmonary nodules, Rosacea, and Tachycardia.    I have reviewed the Past Medical History, Past Surgical History, Social History and Family History.    Vital Signs /72   Pulse 119   Ht 147.3 cm (58\")   Wt 81 kg (178 lb 9.6 oz)   LMP  (LMP Unknown)   SpO2 95%   BMI 37.33 kg/m²  Body mass index is 37.33 kg/m².    General Alert and oriented. No acute distress noted   Pharynx/Throat Class IV  Mallampati airway, large tongue, no evidence of redundant lateral pharyngeal tissue. No oral lesions. No thrush. Moist mucous membranes.   Head Normocephalic. Symmetrical. Atraumatic.    Nose No septal deviation. No drainage   Chest Wall Normal shape. Symmetric expansion with respiration. No tenderness.   Neck Trachea midline, no thyromegaly or adenopathy    Lungs Clear to auscultation bilaterally. No wheezes. No rhonchi. No rales. Respirations regular, even and unlabored.   Heart Regular rhythm and normal rate. Normal S1 and S2. No murmur   Abdomen Soft, non-tender and non-distended. Normal bowel sounds. No masses.   Extremities Moves all extremities well. No edema   Psychiatric Normal mood and affect.     Testing:  · Download 1/27/22-4/26/22 63% use with average nightly use of 7 hours and 58 minutes on auto CPAP 5-14cm H2O, P95 11.1cm , AHI 0.4    Testing:  Study-September 2020-Diagnostic findings: The patient tolerated the home sleep testing with monitoring time of 622.8 minutes. The data obtained make this a technically adequate study. The apnea hypopneas index(AHI) was " 9.5 per sleep hour.  The AHI during supine position was 10.5 per sleep hour. Mean heart rate of 75.4 BPM.  Snoring was noted 26.9% of sleep time. Lowest oxygen saturation during the study was 83%. Saturation below 89% was noted for 3.2 mins.     Impression:  1. Obstructive sleep apnea    2. Obesity (BMI 30-39.9)          Plan:  Sravanthi loves current CPAP pressure of 5-14cm H2O, and will continue using her machine with Air Fit N30 mask. I advised her to move the CPAP unit to her new bedroom and resume its use ASAP. She uses the CPAP device and benefits from its use in terms of reduction of hypersomnia and snoring.  AHI appears to be within adequate range. I reviewed download report and original sleep study report with the patient.     Weight loss will be strongly beneficial to reduce the severity of sleep-disordered breathing.  Caution during activities that require prolonged concentration is strongly advised if sleepiness returns. Changing of PAP supplies regularly is important for effective use.       Follow up with Dr. Sheriff in one year    Thank you for allowing me to participate in your patient's care.      QAMAR Braga  [facility]  Phone: [phone]      Part of this note may be an electronic transcription/translation of spoken language to printed text using the Dragon Dictation System.

## 2022-05-31 ENCOUNTER — HOSPITAL ENCOUNTER (OUTPATIENT)
Dept: CARDIOLOGY | Facility: HOSPITAL | Age: 72
Discharge: HOME | End: 2022-05-31
Admitting: PAIN MEDICINE

## 2022-05-31 ENCOUNTER — APPOINTMENT (OUTPATIENT)
Dept: GENERAL RADIOLOGY | Facility: HOSPITAL | Age: 72
End: 2022-05-31

## 2022-05-31 ENCOUNTER — HOSPITAL ENCOUNTER (EMERGENCY)
Facility: HOSPITAL | Age: 72
Discharge: HOME | End: 2022-05-31
Attending: EMERGENCY MEDICINE | Admitting: EMERGENCY MEDICINE

## 2022-05-31 VITALS
OXYGEN SATURATION: 97 % | DIASTOLIC BLOOD PRESSURE: 76 MMHG | SYSTOLIC BLOOD PRESSURE: 129 MMHG | HEIGHT: 58 IN | RESPIRATION RATE: 16 BRPM | BODY MASS INDEX: 36.73 KG/M2 | WEIGHT: 175 LBS | TEMPERATURE: 98.3 F | HEART RATE: 97 BPM

## 2022-05-31 DIAGNOSIS — I82.452 ACUTE DEEP VEIN THROMBOSIS (DVT) OF LEFT PERONEAL VEIN: Primary | ICD-10-CM

## 2022-05-31 DIAGNOSIS — M79.662 PAIN OF LEFT CALF: ICD-10-CM

## 2022-05-31 DIAGNOSIS — R09.89 SUSPECTED DVT (DEEP VEIN THROMBOSIS): ICD-10-CM

## 2022-05-31 LAB
ALBUMIN SERPL-MCNC: 3.7 G/DL (ref 3.5–5.2)
ALBUMIN/GLOB SERPL: 1.3 G/DL
ALP SERPL-CCNC: 151 U/L (ref 39–117)
ALT SERPL W P-5'-P-CCNC: 22 U/L (ref 1–33)
ANION GAP SERPL CALCULATED.3IONS-SCNC: 13.2 MMOL/L (ref 5–15)
APTT PPP: 32.3 SECONDS (ref 22.7–35.4)
AST SERPL-CCNC: 23 U/L (ref 1–32)
BASOPHILS # BLD AUTO: 0.11 10*3/MM3 (ref 0–0.2)
BASOPHILS NFR BLD AUTO: 0.9 % (ref 0–1.5)
BH CV LOW VAS LEFT PERONEAL VESSEL: 1
BH CV LOW VAS LEFT SOLEAL VESSEL: 1
BH CV LOWER VASCULAR LEFT COMMON FEMORAL AUGMENT: NORMAL
BH CV LOWER VASCULAR LEFT COMMON FEMORAL COMPETENT: NORMAL
BH CV LOWER VASCULAR LEFT COMMON FEMORAL COMPRESS: NORMAL
BH CV LOWER VASCULAR LEFT COMMON FEMORAL PHASIC: NORMAL
BH CV LOWER VASCULAR LEFT COMMON FEMORAL SPONT: NORMAL
BH CV LOWER VASCULAR LEFT DISTAL FEMORAL COMPRESS: NORMAL
BH CV LOWER VASCULAR LEFT GASTRONEMIUS COMPRESS: NORMAL
BH CV LOWER VASCULAR LEFT GREATER SAPH AK COMPRESS: NORMAL
BH CV LOWER VASCULAR LEFT GREATER SAPH BK COMPRESS: NORMAL
BH CV LOWER VASCULAR LEFT LESSER SAPH COMPRESS: NORMAL
BH CV LOWER VASCULAR LEFT MID FEMORAL AUGMENT: NORMAL
BH CV LOWER VASCULAR LEFT MID FEMORAL COMPETENT: NORMAL
BH CV LOWER VASCULAR LEFT MID FEMORAL COMPRESS: NORMAL
BH CV LOWER VASCULAR LEFT MID FEMORAL PHASIC: NORMAL
BH CV LOWER VASCULAR LEFT MID FEMORAL SPONT: NORMAL
BH CV LOWER VASCULAR LEFT PERONEAL COMPRESS: NORMAL
BH CV LOWER VASCULAR LEFT PERONEAL THROMBUS: NORMAL
BH CV LOWER VASCULAR LEFT POPLITEAL AUGMENT: NORMAL
BH CV LOWER VASCULAR LEFT POPLITEAL COMPETENT: NORMAL
BH CV LOWER VASCULAR LEFT POPLITEAL COMPRESS: NORMAL
BH CV LOWER VASCULAR LEFT POPLITEAL PHASIC: NORMAL
BH CV LOWER VASCULAR LEFT POPLITEAL SPONT: NORMAL
BH CV LOWER VASCULAR LEFT POSTERIOR TIBIAL COMPRESS: NORMAL
BH CV LOWER VASCULAR LEFT PROFUNDA FEMORAL COMPRESS: NORMAL
BH CV LOWER VASCULAR LEFT PROXIMAL FEMORAL COMPRESS: NORMAL
BH CV LOWER VASCULAR LEFT SAPHENOFEMORAL JUNCTION COMPRESS: NORMAL
BH CV LOWER VASCULAR LEFT SOLEAL COMPRESS: NORMAL
BH CV LOWER VASCULAR LEFT SOLEAL THROMBUS: NORMAL
BH CV LOWER VASCULAR RIGHT COMMON FEMORAL AUGMENT: NORMAL
BH CV LOWER VASCULAR RIGHT COMMON FEMORAL COMPETENT: NORMAL
BH CV LOWER VASCULAR RIGHT COMMON FEMORAL COMPRESS: NORMAL
BH CV LOWER VASCULAR RIGHT COMMON FEMORAL PHASIC: NORMAL
BH CV LOWER VASCULAR RIGHT COMMON FEMORAL SPONT: NORMAL
BILIRUB SERPL-MCNC: 0.8 MG/DL (ref 0–1.2)
BUN SERPL-MCNC: 16 MG/DL (ref 8–23)
BUN/CREAT SERPL: 12.8 (ref 7–25)
CALCIUM SPEC-SCNC: 9.1 MG/DL (ref 8.6–10.5)
CHLORIDE SERPL-SCNC: 101 MMOL/L (ref 98–107)
CO2 SERPL-SCNC: 21.8 MMOL/L (ref 22–29)
CREAT SERPL-MCNC: 1.25 MG/DL (ref 0.57–1)
DEPRECATED RDW RBC AUTO: 48.7 FL (ref 37–54)
EGFRCR SERPLBLD CKD-EPI 2021: 46.8 ML/MIN/1.73
EOSINOPHIL # BLD AUTO: 0.15 10*3/MM3 (ref 0–0.4)
EOSINOPHIL NFR BLD AUTO: 1.3 % (ref 0.3–6.2)
ERYTHROCYTE [DISTWIDTH] IN BLOOD BY AUTOMATED COUNT: 14.5 % (ref 12.3–15.4)
GLOBULIN UR ELPH-MCNC: 2.8 GM/DL
GLUCOSE SERPL-MCNC: 108 MG/DL (ref 65–99)
HCT VFR BLD AUTO: 39.6 % (ref 34–46.6)
HGB BLD-MCNC: 13.5 G/DL (ref 12–15.9)
IMM GRANULOCYTES # BLD AUTO: 0.3 10*3/MM3 (ref 0–0.05)
IMM GRANULOCYTES NFR BLD AUTO: 2.6 % (ref 0–0.5)
INR PPP: 1.08 (ref 0.9–1.1)
LYMPHOCYTES # BLD AUTO: 3.14 10*3/MM3 (ref 0.7–3.1)
LYMPHOCYTES NFR BLD AUTO: 27 % (ref 19.6–45.3)
MAXIMAL PREDICTED HEART RATE: 151 BPM
MCH RBC QN AUTO: 32.1 PG (ref 26.6–33)
MCHC RBC AUTO-ENTMCNC: 34.1 G/DL (ref 31.5–35.7)
MCV RBC AUTO: 94.1 FL (ref 79–97)
MONOCYTES # BLD AUTO: 0.98 10*3/MM3 (ref 0.1–0.9)
MONOCYTES NFR BLD AUTO: 8.4 % (ref 5–12)
NEUTROPHILS NFR BLD AUTO: 59.8 % (ref 42.7–76)
NEUTROPHILS NFR BLD AUTO: 6.94 10*3/MM3 (ref 1.7–7)
NRBC BLD AUTO-RTO: 0.1 /100 WBC (ref 0–0.2)
PLATELET # BLD AUTO: 436 10*3/MM3 (ref 140–450)
PMV BLD AUTO: 8.4 FL (ref 6–12)
POTASSIUM SERPL-SCNC: 3.8 MMOL/L (ref 3.5–5.2)
PROT SERPL-MCNC: 6.5 G/DL (ref 6–8.5)
PROTHROMBIN TIME: 13.9 SECONDS (ref 11.7–14.2)
RBC # BLD AUTO: 4.21 10*6/MM3 (ref 3.77–5.28)
SODIUM SERPL-SCNC: 136 MMOL/L (ref 136–145)
STRESS TARGET HR: 128 BPM
WBC NRBC COR # BLD: 11.62 10*3/MM3 (ref 3.4–10.8)

## 2022-05-31 PROCEDURE — 93971 EXTREMITY STUDY: CPT

## 2022-05-31 PROCEDURE — 36415 COLL VENOUS BLD VENIPUNCTURE: CPT

## 2022-05-31 PROCEDURE — 99282 EMERGENCY DEPT VISIT SF MDM: CPT

## 2022-05-31 PROCEDURE — 85610 PROTHROMBIN TIME: CPT | Performed by: NURSE PRACTITIONER

## 2022-05-31 PROCEDURE — 80053 COMPREHEN METABOLIC PANEL: CPT | Performed by: NURSE PRACTITIONER

## 2022-05-31 PROCEDURE — 85025 COMPLETE CBC W/AUTO DIFF WBC: CPT | Performed by: NURSE PRACTITIONER

## 2022-05-31 PROCEDURE — 85730 THROMBOPLASTIN TIME PARTIAL: CPT | Performed by: NURSE PRACTITIONER

## 2022-06-13 ENCOUNTER — HOSPITAL ENCOUNTER (OUTPATIENT)
Dept: CT IMAGING | Facility: HOSPITAL | Age: 72
Discharge: HOME | End: 2022-06-13
Admitting: PAIN MEDICINE

## 2022-06-13 DIAGNOSIS — R10.32 LEFT LOWER QUADRANT ABDOMINAL PAIN: ICD-10-CM

## 2022-06-13 PROCEDURE — 74176 CT ABD & PELVIS W/O CONTRAST: CPT

## 2022-09-13 ENCOUNTER — OFFICE VISIT (OUTPATIENT)
Dept: GASTROENTEROLOGY | Facility: CLINIC | Age: 72
End: 2022-09-13

## 2022-09-13 VITALS
HEART RATE: 92 BPM | WEIGHT: 167.2 LBS | TEMPERATURE: 96.9 F | HEIGHT: 59 IN | BODY MASS INDEX: 33.71 KG/M2 | SYSTOLIC BLOOD PRESSURE: 107 MMHG | DIASTOLIC BLOOD PRESSURE: 72 MMHG

## 2022-09-13 DIAGNOSIS — R19.7 DIARRHEA, UNSPECIFIED TYPE: Primary | ICD-10-CM

## 2022-09-13 DIAGNOSIS — R10.13 EPIGASTRIC PAIN: ICD-10-CM

## 2022-09-13 DIAGNOSIS — R93.3 ABNORMAL CT SCAN, SIGMOID COLON: ICD-10-CM

## 2022-09-13 DIAGNOSIS — R10.32 LLQ PAIN: ICD-10-CM

## 2022-09-13 PROCEDURE — 99214 OFFICE O/P EST MOD 30 MIN: CPT | Performed by: PHYSICIAN ASSISTANT

## 2022-09-13 NOTE — PROGRESS NOTES
Chief Complaint  Diarrhea    Subjective          History of Present Illness    Sravanthi Burns is a  72 y.o. female presents for evaluation of diarrhea.  She is a patient of Dr. Araya last seen on 10/28/2020 for similar symptoms.  She is new to me.    She rpeorts 1 month of diarrhea, constant for last week. Going 6-10 times/day. Some nocturnal stools, urgency. No abdominal pain and incontinence. Tried iodium which stops her up but diarrhea comes back after that.  She saw [facility] who gave her Cipro/flagyl which did help temporarily. She is taking peptobismol for the last couple days which has helped. She still feels like she has to have a BM. No weight loss, hematochezia. She is using fiber every night for years.    She had stool studies done with her PCP.  The C. difficile is negative however we are waiting on stool culture and ova/parasite testing that she returned yesterday.    He was seen in 2020 for similar symptoms of intermittent diarrhea and LLQ pain.  She was advised to start fiber.    She has chronic LLQ pain for years. She also has epigastric pain for awhile now. No heartburn or reflux. She has history of lap band---which eroded into stomach so she had it remmoved.     9/9/2022 labs showed CBC with elevated white blood cell count 11.36, TSH normal, CMP with creatinine 1.15, BUN 20, normal LFTs    6/13/2022 CT scan abdomen pelvis without contrast done for chronic LLQ pain did show diverticulosis with chronic thickening of the sigmoid colon-follow-up colonoscopy recommended.  She had nonobstructing left kidney stones, hepatic steatosis with evidence of chronic liver disease.    Colonoscopy with Dr. Dela Cruz on 10/10/2019 showed 4 colon polyps.  Repeat screening in 3 years recommended.  She does have history of adenomatous colon polyps.    She has history of cholecystectomy and hernia repair.  She was on Eliquis for DVT [id number]- diagnosed in June. Now off.    Out of town from 10/2-10/9    Objective   Vital Signs:  "  /72   Pulse 92   Temp 96.9 °F (36.1 °C)   Ht 149.9 cm (59\")   Wt 75.8 kg (167 lb 3.2 oz)   BMI 33.77 kg/m²       Physical Exam  Vitals reviewed.   Constitutional:       General: She is awake. She is not in acute distress.     Appearance: Normal appearance. She is well-developed and well-groomed.   HENT:      Head: Normocephalic.   Pulmonary:      Effort: Pulmonary effort is normal. No respiratory distress.   Abdominal:      General: Abdomen is flat. Bowel sounds are normal. There is no distension.      Palpations: Abdomen is soft. There is no hepatomegaly or mass.      Tenderness: There is abdominal tenderness in the right lower quadrant, epigastric area, suprapubic area and left lower quadrant. There is no guarding.   Skin:     Coloration: Skin is not pale.   Neurological:      Mental Status: She is alert and oriented to person, place, and time.      Gait: Gait is intact.   Psychiatric:         Mood and Affect: Mood and affect normal.         Speech: Speech normal.         Behavior: Behavior is cooperative.         Judgment: Judgment normal.          Result Review :             Assessment and Plan    Diagnoses and all orders for this visit:    1. Diarrhea, unspecified type (Primary)  -     Case Request; Standing  -     Case Request    2. LLQ pain    3. Epigastric pain  -     Case Request; Standing  -     Case Request    4. Abnormal CT scan, sigmoid colon  -     Case Request; Standing  -     Case Request    Given chronic thickening in the sigmoid colon on June CT scan, persistent diarrhea, abdominal pain including epigastric pain, millimeters bidirectional endoscopy for further work-up.    Continue Peptobismil in meantime this is working for her.    Could consider repeat CT scan if symptoms worsen or persist.    Follow Up   Return for EGD, Colonoscopy.    Dragon dictation used throughout this note.     Eveline Arora PA-C   "

## 2022-09-16 DIAGNOSIS — R19.7 DIARRHEA, UNSPECIFIED TYPE: Primary | ICD-10-CM

## 2022-09-16 RX ORDER — DIPHENOXYLATE HYDROCHLORIDE AND ATROPINE SULFATE 2.5; .025 MG/1; MG/1
1 TABLET ORAL 4 TIMES DAILY PRN
Qty: 60 TABLET | Refills: 1 | Status: SHIPPED | OUTPATIENT
Start: 2022-09-16 | End: 2023-01-17

## 2022-09-20 ENCOUNTER — TELEPHONE (OUTPATIENT)
Dept: GASTROENTEROLOGY | Facility: CLINIC | Age: 72
End: 2022-09-20

## 2022-09-20 PROBLEM — R10.13 EPIGASTRIC PAIN: Status: ACTIVE | Noted: 2022-09-20

## 2022-09-20 PROBLEM — R93.3 ABNORMAL CT SCAN, SIGMOID COLON: Status: ACTIVE | Noted: 2022-09-20

## 2022-09-20 PROBLEM — R19.7 DIARRHEA: Status: ACTIVE | Noted: 2022-09-20

## 2022-09-20 NOTE — TELEPHONE ENCOUNTER
PITO Rey for EGD/COLONOSCOPY on 11/29/22 arrive at 1230pm.Prep instructions mailed to verified address.

## 2022-10-03 ENCOUNTER — TELEPHONE (OUTPATIENT)
Dept: GASTROENTEROLOGY | Facility: CLINIC | Age: 72
End: 2022-10-03

## 2022-10-03 NOTE — TELEPHONE ENCOUNTER
Hub staff attempted to follow warm transfer process and was unsuccessful     Caller: Sravanthi Burns    Relationship to patient: Self    Best call back number: [phone]    Patient is needing: PATIENT IS RETURNING A CALL BACK FROM THE OFFICE. SHE WAS WONDERING IF DR. DAVE WAS ABLE TO GET HER APPT FOR THE SCOPE TO BE SOONER THEN 11/29 DUE TO HAVING SEVERE DIARRHEA. PLEASE CALL PATIENT BACK ASAP

## 2022-10-04 ENCOUNTER — TELEPHONE (OUTPATIENT)
Dept: GASTROENTEROLOGY | Facility: CLINIC | Age: 72
End: 2022-10-04

## 2022-10-04 NOTE — TELEPHONE ENCOUNTER
Caller: Sravanthi Burns    Relationship: Self    Best call back number:[phone]    What is the best time to reach you: ANYTIME    Who are you requesting to speak with (clinical staff, provider,  specific staff member): CLINICAL STAFF/ NATALIIA TREJO    What was the call regarding: PT IS RETURNING CALL SHE RECEIVED TO CHANGE EGD SOONER DATE. PT IS CURRENTLY IN [place] PHONE ISN'T WORKING SOMETIMES SO CALL DAUGHTER CYRUS TO SCHEDULE. SHE CAN PHONE PT IN AND SHE'S ALSO ON  VERBAL SO SHE CAN SCHEDULE PT . PT IS EXTREMELY ILL DIARRHEA HAS WORSENING AND PT HAS BEEN VOMITING WHICH IS A NEW SYMPTOM.

## 2022-10-04 NOTE — TELEPHONE ENCOUNTER
Caller: Sravanthi Burns     Relationship to patient: Self    Best call back number:  [phone]    Patient is needing: PATIENT MISSED  CALL AND IS REQUESTING CALL BACK.  PATIENT REQUESTED THAT WE CALL HER DAUGHTER  CYRUS DUBON

## 2022-11-29 ENCOUNTER — ANESTHESIA (OUTPATIENT)
Dept: GASTROENTEROLOGY | Facility: HOSPITAL | Age: 72
End: 2022-11-29

## 2022-11-29 ENCOUNTER — HOSPITAL ENCOUNTER (OUTPATIENT)
Facility: HOSPITAL | Age: 72
Setting detail: HOSPITAL OUTPATIENT SURGERY
Discharge: HOME | End: 2022-11-29
Attending: INTERNAL MEDICINE | Admitting: INTERNAL MEDICINE

## 2022-11-29 ENCOUNTER — ANESTHESIA EVENT (OUTPATIENT)
Dept: GASTROENTEROLOGY | Facility: HOSPITAL | Age: 72
End: 2022-11-29

## 2022-11-29 VITALS
TEMPERATURE: 98.6 F | HEART RATE: 81 BPM | RESPIRATION RATE: 16 BRPM | OXYGEN SATURATION: 98 % | DIASTOLIC BLOOD PRESSURE: 77 MMHG | HEIGHT: 58 IN | BODY MASS INDEX: 34.85 KG/M2 | SYSTOLIC BLOOD PRESSURE: 121 MMHG | WEIGHT: 166 LBS

## 2022-11-29 DIAGNOSIS — R93.3 ABNORMAL CT SCAN, SIGMOID COLON: ICD-10-CM

## 2022-11-29 DIAGNOSIS — R10.13 EPIGASTRIC PAIN: ICD-10-CM

## 2022-11-29 DIAGNOSIS — R19.7 DIARRHEA, UNSPECIFIED TYPE: ICD-10-CM

## 2022-11-29 LAB — GLUCOSE BLDC GLUCOMTR-MCNC: 70 MG/DL (ref 70–130)

## 2022-11-29 PROCEDURE — S0260 H&P FOR SURGERY: HCPCS | Performed by: INTERNAL MEDICINE

## 2022-11-29 PROCEDURE — 45380 COLONOSCOPY AND BIOPSY: CPT | Performed by: INTERNAL MEDICINE

## 2022-11-29 PROCEDURE — 88341 IMHCHEM/IMCYTCHM EA ADD ANTB: CPT | Performed by: INTERNAL MEDICINE

## 2022-11-29 PROCEDURE — 43239 EGD BIOPSY SINGLE/MULTIPLE: CPT | Performed by: INTERNAL MEDICINE

## 2022-11-29 PROCEDURE — 88305 TISSUE EXAM BY PATHOLOGIST: CPT | Performed by: INTERNAL MEDICINE

## 2022-11-29 PROCEDURE — 88342 IMHCHEM/IMCYTCHM 1ST ANTB: CPT | Performed by: INTERNAL MEDICINE

## 2022-11-29 PROCEDURE — 25010000002 PROPOFOL 10 MG/ML EMULSION: Performed by: STUDENT IN AN ORGANIZED HEALTH CARE EDUCATION/TRAINING PROGRAM

## 2022-11-29 PROCEDURE — 82962 GLUCOSE BLOOD TEST: CPT

## 2022-11-29 RX ORDER — SODIUM CHLORIDE 9 MG/ML
40 INJECTION, SOLUTION INTRAVENOUS AS NEEDED
Status: DISCONTINUED | OUTPATIENT
Start: 2022-11-29 | End: 2022-11-29 | Stop reason: HOSPADM

## 2022-11-29 RX ORDER — SODIUM CHLORIDE 0.9 % (FLUSH) 0.9 %
10 SYRINGE (ML) INJECTION AS NEEDED
Status: DISCONTINUED | OUTPATIENT
Start: 2022-11-29 | End: 2022-11-29 | Stop reason: HOSPADM

## 2022-11-29 RX ORDER — SODIUM CHLORIDE, SODIUM LACTATE, POTASSIUM CHLORIDE, CALCIUM CHLORIDE 600; 310; 30; 20 MG/100ML; MG/100ML; MG/100ML; MG/100ML
30 INJECTION, SOLUTION INTRAVENOUS CONTINUOUS PRN
Status: DISCONTINUED | OUTPATIENT
Start: 2022-11-29 | End: 2022-11-29 | Stop reason: HOSPADM

## 2022-11-29 RX ORDER — PROPOFOL 10 MG/ML
VIAL (ML) INTRAVENOUS AS NEEDED
Status: DISCONTINUED | OUTPATIENT
Start: 2022-11-29 | End: 2022-11-29 | Stop reason: SURG

## 2022-11-29 RX ORDER — OMEPRAZOLE 40 MG/1
40 CAPSULE, DELAYED RELEASE ORAL DAILY
Qty: 42 CAPSULE | Refills: 0 | Status: SHIPPED | OUTPATIENT
Start: 2022-11-29 | End: 2023-01-10

## 2022-11-29 RX ORDER — LIDOCAINE HYDROCHLORIDE 20 MG/ML
INJECTION, SOLUTION INFILTRATION; PERINEURAL AS NEEDED
Status: DISCONTINUED | OUTPATIENT
Start: 2022-11-29 | End: 2022-11-29 | Stop reason: SURG

## 2022-11-29 RX ORDER — SODIUM CHLORIDE 0.9 % (FLUSH) 0.9 %
10 SYRINGE (ML) INJECTION EVERY 12 HOURS SCHEDULED
Status: DISCONTINUED | OUTPATIENT
Start: 2022-11-29 | End: 2022-11-29 | Stop reason: HOSPADM

## 2022-11-29 RX ADMIN — LIDOCAINE HYDROCHLORIDE 60 MG: 20 INJECTION, SOLUTION INFILTRATION; PERINEURAL at 13:57

## 2022-11-29 RX ADMIN — PROPOFOL 80 MG: 10 INJECTION, EMULSION INTRAVENOUS at 13:57

## 2022-11-29 RX ADMIN — SODIUM CHLORIDE, POTASSIUM CHLORIDE, SODIUM LACTATE AND CALCIUM CHLORIDE 30 ML/HR: 600; 310; 30; 20 INJECTION, SOLUTION INTRAVENOUS at 13:45

## 2022-11-29 RX ADMIN — PROPOFOL 180 MCG/KG/MIN: 10 INJECTION, EMULSION INTRAVENOUS at 13:58

## 2022-11-29 NOTE — ANESTHESIA PREPROCEDURE EVALUATION
Anesthesia Evaluation     Patient summary reviewed and Nursing notes reviewed   history of anesthetic complications: difficult airway  NPO Solid Status: > 8 hours  NPO Liquid Status: > 2 hours           Airway   Mallampati: II  TM distance: >3 FB  Neck ROM: full  Dental      Pulmonary    (+) sleep apnea,   Cardiovascular     ECG reviewed    (+) hypertension, hyperlipidemia,       Neuro/Psych  GI/Hepatic/Renal/Endo    (+) obesity,  GERD,  liver disease fatty liver disease, renal disease CRI, diabetes mellitus, thyroid problem hypothyroidism    Musculoskeletal     Abdominal    Substance History      OB/GYN          Other                        Anesthesia Plan    ASA 3     MAC     intravenous induction     Anesthetic plan, risks, benefits, and alternatives have been provided, discussed and informed consent has been obtained with: patient.        CODE STATUS:

## 2022-11-29 NOTE — ANESTHESIA POSTPROCEDURE EVALUATION
Patient: Sravanthi Burns    Procedure Summary     Date: 11/29/22 Room / Location: [facility] ENDOSCOPY 7 /  [facility]    Anesthesia Start: 1351 Anesthesia Stop: 1425    Procedures:       COLONOSCOPY to cecum and TI;  with biopsies, cold bx polyps,      ESOPHAGOGASTRODUODENOSCOPY iwth biopsies (Esophagus) Diagnosis:       Diarrhea, unspecified type      Epigastric pain      Abnormal CT scan, sigmoid colon      (Diarrhea, unspecified type [R19.7])      (Epigastric pain [R10.13])      (Abnormal CT scan, sigmoid colon [R93.3])    Surgeons: Kathy Araya MD Provider: Reinaldo Carrillo MD    Anesthesia Type: MAC ASA Status: 3          Anesthesia Type: MAC    Vitals  Vitals Value Taken Time   /77 11/29/22 1445   Temp     Pulse 81 11/29/22 1445   Resp 16 11/29/22 1445   SpO2 98 % 11/29/22 1445           Post Anesthesia Care and Evaluation    Patient location during evaluation: bedside  Patient participation: complete - patient participated  Level of consciousness: awake and alert  Pain management: adequate    Airway patency: patent  Anesthetic complications: No anesthetic complications  PONV Status: controlled  Cardiovascular status: blood pressure returned to baseline and acceptable  Respiratory status: acceptable  Hydration status: acceptable

## 2022-12-03 LAB
LAB AP CASE REPORT: NORMAL
LAB AP SPECIAL STAINS: NORMAL
PATH REPORT.FINAL DX SPEC: NORMAL
PATH REPORT.GROSS SPEC: NORMAL

## 2022-12-08 NOTE — PROGRESS NOTES
Inflammatory change was seen in the small intestine and stomach.  Negative for H. pylori.  Continue omeprazole daily x6 weeks.    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  Medication for this condition has been sent to your pharmacy.  Take as directed.  If diarrhea recurs as medication dose decreases, contact the office.    The polyp(s) biopsies showed adenomatous change. This is not cancerous but is considered potentially precancerous. Follow-up colonoscopy in 5 years is advised.     Schedule 4 to 6-week follow-up to assess response to medication

## 2022-12-12 ENCOUNTER — TELEPHONE (OUTPATIENT)
Dept: GASTROENTEROLOGY | Facility: CLINIC | Age: 72
End: 2022-12-12

## 2022-12-12 RX ORDER — BUDESONIDE 3 MG/1
CAPSULE, COATED PELLETS ORAL
Qty: 180 CAPSULE | Refills: 0 | Status: SHIPPED | OUTPATIENT
Start: 2022-12-12 | End: 2023-03-12

## 2022-12-12 NOTE — TELEPHONE ENCOUNTER
----- Message from Kathy Araya MD sent at 12/8/2022  1:10 PM EST -----  Inflammatory change was seen in the small intestine and stomach.  Negative for H. pylori.  Continue omeprazole daily x6 weeks.    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  Medication for this condition has been sent to your pharmacy.  Take as directed.  If diarrhea recurs as medication dose decreases, contact the office.    The polyp(s) biopsies showed adenomatous change. This is not cancerous but is considered potentially precancerous. Follow-up colonoscopy in 5 years is advised.     Schedule 4 to 6-week follow-up to assess response to medication

## 2022-12-12 NOTE — TELEPHONE ENCOUNTER
Caller: Sravanthi Burns    Relationship to patient: Self    Best call back number: [phone]    Patient is needing: VOICEMAIL WAS LEFT SAYING CLINICAL HAD QUESTIONS. PT RETURNING CALL

## 2022-12-12 NOTE — TELEPHONE ENCOUNTER
I have called the pts pharmacy and they stated the pt picked up the omeprazole on 12/1     Dr Araya,  Did you send in something for her microscopic colitis?        vm left for pt to call back

## 2022-12-12 NOTE — TELEPHONE ENCOUNTER
It is noted that this pt has seen their message from Dr Araya concerning their results.    Hm and cs recall placed for 11/29/27

## 2022-12-13 ENCOUNTER — TELEPHONE (OUTPATIENT)
Dept: GASTROENTEROLOGY | Facility: CLINIC | Age: 72
End: 2022-12-13

## 2022-12-13 NOTE — TELEPHONE ENCOUNTER
Caller: Sravanthi Burns    Relationship to patient: Self    Best call back number: [phone]    Patient is needing: PT CALLED TO SCHEDULE FU WITH DR. DAVE. PER PT, THE APPT NEEDS TO BE 4-6 WEEKS FROM LAST PROCEDURE. I WAS UNABLE TO FIND AN APPT IN THAT TIMEFRAME.     PT SAID INSURANCE COMPANY NEEDS ADDITIONAL PAPERWORK FOR BUDESONIDE.

## 2022-12-14 NOTE — TELEPHONE ENCOUNTER
Called pt and pt can make appt on 01/17 at 1245 with Dr Araya . Dr Araya updated.       Pt asking again about budesonide pa.

## 2022-12-16 ENCOUNTER — TELEPHONE (OUTPATIENT)
Dept: GASTROENTEROLOGY | Facility: CLINIC | Age: 72
End: 2022-12-16

## 2022-12-16 NOTE — TELEPHONE ENCOUNTER
Budesonide PA submitted to CMM/Approved and pharmacy notified     Sravanthi Burns Key: [id number] - PA Case ID: [id number] - Rx #: [id number] help? Call us at [phone]  Outcome  Approvedtoday  PA Case: [id number], Status: Approved, Coverage Starts on: 1/1/2022 12:00:00 AM, Coverage Ends on: 12/31/2023 12:00:00 AM. Questions? Contact [phone].  Drug  Budesonide 3MG dr capsules  Form  [employer] Electronic PA Form  Original Claim Info  [id number] PA REQD CALL [phone]

## 2023-01-09 RX ORDER — OMEPRAZOLE 40 MG/1
CAPSULE, DELAYED RELEASE ORAL
Qty: 42 CAPSULE | Refills: 0 | OUTPATIENT
Start: 2023-01-09

## 2023-01-17 ENCOUNTER — OFFICE VISIT (OUTPATIENT)
Dept: GASTROENTEROLOGY | Facility: CLINIC | Age: 73
End: 2023-01-17
Payer: MEDICARE

## 2023-01-17 VITALS
TEMPERATURE: 97.3 F | BODY MASS INDEX: 36.02 KG/M2 | HEART RATE: 88 BPM | OXYGEN SATURATION: 96 % | HEIGHT: 58 IN | DIASTOLIC BLOOD PRESSURE: 56 MMHG | WEIGHT: 171.6 LBS | SYSTOLIC BLOOD PRESSURE: 107 MMHG

## 2023-01-17 DIAGNOSIS — K29.70 GASTRITIS WITHOUT BLEEDING, UNSPECIFIED CHRONICITY, UNSPECIFIED GASTRITIS TYPE: ICD-10-CM

## 2023-01-17 DIAGNOSIS — K52.839 MICROSCOPIC COLITIS, UNSPECIFIED MICROSCOPIC COLITIS TYPE: Primary | ICD-10-CM

## 2023-01-17 DIAGNOSIS — Z86.010 HISTORY OF ADENOMATOUS POLYP OF COLON: ICD-10-CM

## 2023-01-17 PROCEDURE — 99213 OFFICE O/P EST LOW 20 MIN: CPT | Performed by: INTERNAL MEDICINE

## 2023-01-17 RX ORDER — DULAGLUTIDE 0.75 MG/.5ML
0.75 INJECTION, SOLUTION SUBCUTANEOUS
COMMUNITY
Start: 2022-01-12

## 2023-01-17 RX ORDER — OMEPRAZOLE MAGNESIUM 10 MG/1
GRANULE, DELAYED RELEASE ORAL
COMMUNITY
End: 2023-01-17

## 2023-01-17 RX ORDER — ATORVASTATIN CALCIUM 20 MG/1
TABLET, FILM COATED ORAL
COMMUNITY
Start: 2022-12-05

## 2023-01-17 RX ORDER — ESTRADIOL 0.05 MG/D
FILM, EXTENDED RELEASE TRANSDERMAL
COMMUNITY
Start: 2022-09-03

## 2023-01-17 RX ORDER — OMEPRAZOLE 40 MG/1
40 CAPSULE, DELAYED RELEASE ORAL
COMMUNITY
End: 2023-01-17

## 2023-01-17 RX ORDER — PROGESTERONE 100 MG/1
CAPSULE ORAL
COMMUNITY
Start: 2023-01-04

## 2023-01-17 RX ORDER — BUDESONIDE 3 MG/1
3 CAPSULE, COATED PELLETS ORAL
COMMUNITY
Start: 2022-12-19 | End: 2023-01-17 | Stop reason: SDUPTHER

## 2023-01-17 RX ORDER — LEVOTHYROXINE SODIUM 0.1 MG/1
TABLET ORAL
COMMUNITY
Start: 2022-12-14

## 2023-01-17 RX ORDER — LIOTHYRONINE SODIUM 5 UG/1
TABLET ORAL
COMMUNITY
Start: 2022-11-25 | End: 2023-01-17 | Stop reason: ALTCHOICE

## 2023-01-17 RX ORDER — PEPPERMINT OIL 90 MG
CAPSULE, DELAYED, AND EXTENDED RELEASE ORAL
COMMUNITY

## 2023-01-17 NOTE — PROGRESS NOTES
Subjective   Chief Complaint   Patient presents with   • EGD       Sravanthi Burns is a  72 y.o. female here for a follow up visit for microscopic colitis.     Patient underwent EGD and colonoscopy on 11/29/2022-gastritis seen on biopsy, negative for H. pylori.  Continue omeprazole daily x6 weeks.  Colon biopsies were positive for microscopic colitis.  She also had an adenomatous [id number] follow-up recommended    Diarrhea has improved with budesonide - she tapers to 2 tomorrow.  Stools are formed.  Prior to starting budesonide she had diarrhea for 3 straight months.  She does report that she had significant fluid retention on this medication.  She reports that she gained 20 pounds.  She has lost 8 of them back.  She reports that she had a lot of swelling in her lower extremities and her face but this is improved.    She completed the omeprazole.  No abdominal pain heartburn nausea or vomiting.    HPI  Past Medical History:   Diagnosis Date   • Anxiety    • Cardiomegaly     MILD   • Cataract    • Chronic kidney disease    • Colon polyps     FOLLOWED BY DR. LUCY HILL   • DDD (degenerative disc disease), lumbar    • Diabetes mellitus (HCC) 2021   • Disease of thyroid gland     HYPOTHYROIDISM   • Diverticulitis of colon    • Elevated cholesterol    • Excessive sleepiness    • Fatty liver    • GERD (gastroesophageal reflux disease)    • Gout    • Hard to intubate    • Hot flashes    • HPV in female    • Hyperlipidemia    • Hypertension    • Insomnia    • Low back pain    • Lumbar radiculopathy    • Lung disease    • DELIA (obstructive sleep apnea)    • Panic disorder    • Postmenopausal HRT (hormone replacement therapy)     PROGESTERONE AND ESTRADIAL   • Pulmonary nodules     RIGHT MIDDLE LOBE   • Rosacea    • Tachycardia      Past Surgical History:   Procedure Laterality Date   • CHOLECYSTECTOMY N/A 03/12/2002    WITH CHOLANGIOGRAM, DR. LYNNETTE CHAVEZ AT [facility]   • COLONOSCOPY N/A 10/4/2016    4 SESSILE TUBULAR  ADENOMA POLYPS IN HEPATIC FLEXURE, 1 SESSILE TUBULAR ADENOMA AND 1 HYPERPLASTIC POLYP IN RECTUM, 6 MM SESSILE HYPERPLASTIC POLYP IN SIGMOID, 2 SESSILE HYPERPLASTI POLYPS IN RECTUM, MULTIPLE SMALL AND LARGE DIVERTICULA, RESCOPE IN 3 YRS, DR. LUCY HILL AT [facility]   • COLONOSCOPY N/A 08/19/2003    ENTIRE COLON WNL, DR. ARIAS THOMASON AT [facility]   • COLONOSCOPY N/A 10/07/2008    MULTIPLE LARGE MOUTHED DIVERTICULA IN SIGMOID, RESCOPE IN 5 YRS, DR. ARIAS THOMASON AT [facility]   • COLONOSCOPY N/A 06/21/2011    DIVERTICULOSIS, MILD COLONIC SPASM, RESCOPE IN 5 YRS, DR. ARIAS THOMASON AT [facility]   • COLONOSCOPY N/A 10/10/2019    5 MM HYPERPLASTIC POLYP IN CECUM, 5 MM HYPERPLASTIC POLYP IN ASCENDING, 5 MM HYPERPLASTIC POLYP IN TRANSVERSE, 5 MM HYPERPLASTIC POLYP IN DESCENDING, RESCOPE IN 3 YRS, DR. LUCY HILL AT [facility]   • COLONOSCOPY N/A 11/29/2022    Procedure: COLONOSCOPY to cecum and TI;  with biopsies, cold bx polyps,;  Surgeon: Kathy Araya MD;  Location: [facility] ENDOSCOPY;  Service: Gastroenterology;  Laterality: N/A;  pre:  Diarrhea, left lower quadrant pain, abnormal CT scan of the sigmoid colon  post:  polyps, diverticulosis, abnormal colon mucosa, hemorrhoids   • COSMETIC SURGERY N/A 1995    ABDOMENOPLASTY AND LIPOSUCTION, DR. MAUREEN WATERHOUSE   • COSMETIC SURGERY Bilateral 1997    ARM REDUCTION, BRACHIOPLASTY, DR. MAUREEN WATERHOUSE   • COSMETIC SURGERY Bilateral     UPPER EYELID BLEPHAROPLASTY   • COSMETIC SURGERY N/A 10/13/2000    LIPECTOMY OF ABDOMINAL WALL, DR. MAUREEN WATERHOUSE   • CYSTOSCOPY N/A 03/19/2014    DR. JERMAINE MURRIETA AT [facility]   • DIAGNOSTIC LAPAROSCOPY N/A 06/04/2010    DEBRIDEMENT OF ABDOMINAL WALL, DR. SILVERIO AT [facility]   • DILATATION AND CURETTAGE N/A 02/2003   • ENDOSCOPY N/A 01/13/2006    REFLUX ESOPHAGITIS, OTHERWISE WNL, DR. JOSE J WELLS AT [facility]   • ENDOSCOPY N/A 11/29/2022    Procedure: ESOPHAGOGASTRODUODENOSCOPY iwth biopsies;  Surgeon: Kathy Araya MD;  Location: [facility]  ENDOSCOPY;  Service: Gastroenterology;  Laterality: N/A;  Pre:  epigastric pain  post:  gastritis, esophagitis,    • GASTRIC BANDING REMOVAL N/A 07/2011   • GASTRIC PORT CHANGE N/A     DR. SILVERIO AT [facility]   • HERNIA REPAIR N/A 03/12/2002    VENTRAL HERNIA REPAIR, DR. KAITLYNN CHAVEZ AT [facility]   • LAPAROSCOPIC GASTRIC BANDING N/A 02/21/2017    DR. SILVERIO AT [facility]   • LAPAROSCOPIC LYSIS OF ADHESIONS N/A 11/05/2018    DR. LORI HILL AT [place]   • SHOULDER ARTHROSCOPY W/ LABRAL REPAIR Left     AND DEBRIDEMENT OF ROTATOR CUFF   • TENSION FREE VAGINAL TAPING WITH MINI ARC SLING N/A 03/19/2004    DR. JERMAINE MURRIETA AT [facility]   • TUBAL ABDOMINAL LIGATION Bilateral 1982       Current Outpatient Medications:   •  allopurinol (ZYLOPRIM) 300 MG tablet, Daily., Disp: , Rfl:   •  atenolol (TENORMIN) 50 MG tablet, TAKE ONE TABLET BY MOUTH DAILY, Disp: 30 tablet, Rfl: 0  •  atorvastatin (LIPITOR) 20 MG tablet, , Disp: , Rfl:   •  Budesonide (ENTOCORT EC) 3 MG 24 hr capsule, Take 3 capsules by mouth Daily for 30 days, THEN 2 capsules Daily for 30 days, THEN 1 capsule Daily for 30 days., Disp: 180 capsule, Rfl: 0  •  Calcium-Vitamin D-Vitamin K 500-500-40 MG-UNT-MCG chewable tablet, Chew., Disp: , Rfl:   •  Cholecalciferol (VITAMIN D3) 5000 units chewable tablet, Chew Daily., Disp: , Rfl:   •  Dulaglutide (Trulicity) 0.75 MG/0.5ML solution pen-injector, Inject 0.75 mg under the skin into the appropriate area as directed Every 7 (Seven) Days., Disp: , Rfl:   •  estradiol (ESTRACE) 0.1 MG/GM vaginal cream, Insert 2 g into the vagina Daily. Estradiol-testosterone cream compounded, use daily, Disp: , Rfl:   •  estradiol (MINIVELLE, VIVELLE-DOT) 0.05 MG/24HR patch, 2 (two) times a week, Disp: , Rfl:   •  furosemide (LASIX) 40 MG tablet, , Disp: , Rfl:   •  glucose blood test strip, 1 strip by Other route Daily., Disp: , Rfl:   •  imipramine (TOFRANIL) 50 MG tablet, TAKE ONE TABLET BY MOUTH AT BEDTIME (Patient  "taking differently: 3 (Three) Times a Day.), Disp: 30 tablet, Rfl: 0  •  levothyroxine (SYNTHROID, LEVOTHROID) 100 MCG tablet, , Disp: , Rfl:   •  Nutritional Supplements (Silica) 12.5 MG capsule, , Disp: , Rfl:   •  Peppermint Oil (IBgard) 90 MG capsule controlled-release, , Disp: , Rfl:   •  Probiotic Product capsule, , Disp: , Rfl:   •  Progesterone (PROMETRIUM) 100 MG capsule, , Disp: , Rfl:   •  valsartan (DIOVAN) 160 MG tablet, Take 320 mg by mouth Daily., Disp: , Rfl:   PRN Meds:.  Allergies   Allergen Reactions   • Other Dermatitis, Itching and Swelling     \"surgical glue\"   • Duloxetine Other (See Comments)     Other reaction(s): Decreased libido  DECREASED LIBIDO   • Erythromycin Nausea Only   • Formaldehyde Itching   • Minocycline Swelling     GENERALIZED   • Nsaids Other (See Comments)     Avoids due to CKD    • Tea Tree Oil Other (See Comments)     Found during allergy test   • Venlafaxine Other (See Comments)     Other reaction(s): Decreased libido  DECREASED LIBIDO.     Social History     Socioeconomic History   • Marital status:    Tobacco Use   • Smoking status: Former     Packs/day: 0.50     Years: 20.00     Pack years: 10.00     Types: Cigarettes     Quit date: 1992     Years since quittin.0   • Smokeless tobacco: Never   Substance and Sexual Activity   • Alcohol use: Yes     Comment: Do not drink daily   • Drug use: No   • Sexual activity: Yes     Partners: Male     Birth control/protection: Post-menopausal     Family History   Problem Relation Age of Onset   • Heart murmur Mother    • Hyperlipidemia Mother    • Heart disease Mother    • Hypertension Mother    • Alcohol abuse Father    • Cancer Father    • Liver disease Father    • Cirrhosis Father    • Hypertension Sister    • Heart disease Brother    • Basal cell carcinoma Brother    • Hypertension Brother    • Cancer Brother    • No Known Problems Daughter    • No Known Problems Son    • Cancer Maternal Grandmother    • " Suicidality Paternal Grandfather      Review of Systems   Constitutional: Positive for unexpected weight change. Negative for appetite change.   Cardiovascular: Negative for leg swelling.   Gastrointestinal: Negative for abdominal pain, constipation and diarrhea.     Vitals:    01/17/23 1245   BP: 107/56   Pulse: 88   Temp: 97.3 °F (36.3 °C)   SpO2: 96%         01/17/23  1245   Weight: 77.8 kg (171 lb 9.6 oz)       Objective   Physical Exam  Constitutional:       Appearance: Normal appearance. She is well-developed.   HENT:      Head: Normocephalic and atraumatic.   Eyes:      General: No scleral icterus.     Conjunctiva/sclera: Conjunctivae normal.   Pulmonary:      Effort: Pulmonary effort is normal.   Abdominal:      General: There is no distension.      Palpations: Abdomen is soft.   Musculoskeletal:      Cervical back: Normal range of motion and neck supple.   Skin:     General: Skin is warm and dry.   Neurological:      Mental Status: She is alert.   Psychiatric:         Mood and Affect: Mood normal.         Behavior: Behavior normal.       No radiology results for the last 7 days    Assessment & Plan   Diagnoses and all orders for this visit:    Microscopic colitis, unspecified microscopic colitis type    Gastritis without bleeding, unspecified chronicity, unspecified gastritis type    History of adenomatous polyp of colon    Other orders  -     atorvastatin (LIPITOR) 20 MG tablet  -     Discontinue: Budesonide (ENTOCORT EC) 3 MG 24 hr capsule; Take 3 capsules by mouth.  -     Dulaglutide (Trulicity) 0.75 MG/0.5ML solution pen-injector; Inject 0.75 mg under the skin into the appropriate area as directed Every 7 (Seven) Days.  -     estradiol (MINIVELLE, VIVELLE-DOT) 0.05 MG/24HR patch; 2 (two) times a week  -     glucose blood test strip; 1 strip by Other route Daily.  -     levothyroxine (SYNTHROID, LEVOTHROID) 100 MCG tablet  -     Discontinue: liothyronine (CYTOMEL) 5 MCG tablet  -     Nutritional  Supplements (Silica) 12.5 MG capsule  -     Discontinue: omeprazole (priLOSEC) 40 MG capsule; Take 40 mg by mouth.  -     Discontinue: Omeprazole Magnesium (PrilOSEC) 10 MG pack  -     Peppermint Oil (IBgard) 90 MG capsule controlled-release  -     Probiotic Product capsule  -     Progesterone (PROMETRIUM) 100 MG capsule  -     Discontinue: progesterone (PROMETRIUM) 200 MG capsule      Plan:  · Continue to taper Entocort per previous instructions-diarrhea has improved.  We discussed natural history of microscopic colitis.  · She has completed 6 weeks of omeprazole for gastritis-she was H. pylori negative.  · Repeat colonoscopy in 2027 due to history of adenomatous polyps.  Contact the office if symptoms do not continue to resolve

## 2023-05-26 ENCOUNTER — OFFICE VISIT (OUTPATIENT)
Dept: SLEEP MEDICINE | Facility: HOSPITAL | Age: 73
End: 2023-05-26
Payer: MEDICARE

## 2023-05-26 VITALS
SYSTOLIC BLOOD PRESSURE: 116 MMHG | BODY MASS INDEX: 35.68 KG/M2 | DIASTOLIC BLOOD PRESSURE: 67 MMHG | HEART RATE: 90 BPM | WEIGHT: 170 LBS | HEIGHT: 58 IN | OXYGEN SATURATION: 98 %

## 2023-05-26 DIAGNOSIS — G47.33 OBSTRUCTIVE SLEEP APNEA: Primary | ICD-10-CM

## 2023-05-26 DIAGNOSIS — E66.9 OBESITY (BMI 30-39.9): ICD-10-CM

## 2023-05-26 PROCEDURE — G0463 HOSPITAL OUTPT CLINIC VISIT: HCPCS

## 2023-05-26 NOTE — PROGRESS NOTES
[facility]  Telephone: [phone] / Fax: [phone] [place]  Telephone: [phone] / Fax: [phone] Kisha Cleary    PCP: Shalonda Krause MD    Reason for visit: DELIA f/u    Sravanthi Burns is a 72 y.o.female  was last seen at [facility] sleep lab in May 2023. She is doing great on auto CPAP 5-14cm H2O. Pressures feel comfortable. She use under the nose mask. It fits well. She is unaware of snoring or gasping respirations while using the machine. She continues to have some level of fatigue. She has not had any interval hospitalizations. She feels air escaping from the hose on top of her head. However, it is not disruptive enough to change to a different headgear style at this point.    SH- no tobacco, no alcohol, 3 caffeine per day    ROS +SOA, +GERD, +bloating, +anxiety.    DME [facility]    Current Medications:    Current Outpatient Medications:   •  allopurinol (ZYLOPRIM) 300 MG tablet, Daily., Disp: , Rfl:   •  atenolol (TENORMIN) 50 MG tablet, TAKE ONE TABLET BY MOUTH DAILY, Disp: 30 tablet, Rfl: 0  •  atorvastatin (LIPITOR) 20 MG tablet, , Disp: , Rfl:   •  Calcium-Vitamin D-Vitamin K 500-500-40 MG-UNT-MCG chewable tablet, Chew., Disp: , Rfl:   •  Cholecalciferol (VITAMIN D3) 5000 units chewable tablet, Chew Daily., Disp: , Rfl:   •  Dulaglutide (Trulicity) 0.75 MG/0.5ML solution pen-injector, Inject 0.75 mg under the skin into the appropriate area as directed Every 7 (Seven) Days., Disp: , Rfl:   •  estradiol (ESTRACE) 0.1 MG/GM vaginal cream, Insert 2 g into the vagina Daily. Estradiol-testosterone cream compounded, use daily, Disp: , Rfl:   •  estradiol (MINIVELLE, VIVELLE-DOT) 0.05 MG/24HR patch, 2 (two) times a week, Disp: , Rfl:   •  furosemide (LASIX) 40 MG tablet, , Disp: , Rfl:   •  glucose blood test strip, 1 strip by Other route Daily., Disp: , Rfl:   •  imipramine (TOFRANIL) 50 MG tablet, TAKE ONE TABLET BY MOUTH AT BEDTIME (Patient taking differently: 3 (Three) Times a  "Day.), Disp: 30 tablet, Rfl: 0  •  levothyroxine (SYNTHROID, LEVOTHROID) 100 MCG tablet, , Disp: , Rfl:   •  Nutritional Supplements (Silica) 12.5 MG capsule, , Disp: , Rfl:   •  Peppermint Oil (IBgard) 90 MG capsule controlled-release, , Disp: , Rfl:   •  Probiotic Product capsule, , Disp: , Rfl:   •  Progesterone (PROMETRIUM) 100 MG capsule, , Disp: , Rfl:   •  valsartan (DIOVAN) 160 MG tablet, Take 320 mg by mouth Daily., Disp: , Rfl:    also entered in Sleep Questionnaire    Patient  has a past medical history of Anxiety, Cardiomegaly, Cataract, Chronic kidney disease, Colon polyps, DDD (degenerative disc disease), lumbar, Diabetes mellitus (2021), Disease of thyroid gland, Diverticulitis of colon, Elevated cholesterol, Excessive sleepiness, Fatty liver, GERD (gastroesophageal reflux disease), Gout, Hard to intubate, Hot flashes, HPV in female, Hyperlipidemia, Hypertension, Insomnia, Low back pain, Lumbar radiculopathy, Lung disease, DELIA (obstructive sleep apnea), Panic disorder, Postmenopausal HRT (hormone replacement therapy), Pulmonary nodules, Rosacea, and Tachycardia.    I have reviewed the Past Medical History, Past Surgical History, Social History and Family History.    Vital Signs /67   Pulse 90   Ht 147.3 cm (58\")   Wt 77.1 kg (170 lb)   LMP  (LMP Unknown)   SpO2 98%   BMI 35.53 kg/m²  Body mass index is 35.53 kg/m².    General Alert and oriented. No acute distress noted   Pharynx/Throat Class   Mallampati airway, large tongue, no evidence of redundant lateral pharyngeal tissue. No oral lesions. No thrush. Moist mucous membranes.   Head Normocephalic. Symmetrical. Atraumatic.    Nose No septal deviation. No drainage   Chest Wall Normal shape. Symmetric expansion with respiration. No tenderness.   Neck Trachea midline, no thyromegaly or adenopathy    Lungs Clear to auscultation bilaterally. No wheezes. No rhonchi. No rales. Respirations regular, even and unlabored.   Heart Regular rhythm and " normal rate. Normal S1 and S2. No murmur   Abdomen Soft, non-tender and non-distended. Normal bowel sounds. No masses.   Extremities Moves all extremities well. No edema   Psychiatric Normal mood and affect.     Testing:  · Download 2/24/23-5/24/23 96% use with average nightly use of 9 hours and 1 minutes. 5-14cm H2O, avg pr is 11.5cm H2O, AHI 0.3/hr.    Study-Testing:  Study-September 2020-Diagnostic findings: The patient tolerated the home sleep testing with monitoring time of 622.8 minutes. The data obtained make this a technically adequate study. The apnea hypopneas index(AHI) was 9.5 per sleep hour.  The AHI during supine position was 10.5 per sleep hour. Mean heart rate of 75.4 BPM.  Snoring was noted 26.9% of sleep time. Lowest oxygen saturation during the study was 83%. Saturation below 89% was noted for 3.2 mins.     Impression:  1. Obstructive sleep apnea    2. Obesity (BMI 30-39.9)          Plan:  Patient uses the CPAP device and benefits from its use in terms of reduction of hypersomnia and snoring.  AHI appears to be within adequate range. I reviewed download report and original sleep study report with the patient.  She will continue CPAP 5-14cm H2O with Air Fit N30 mask.    Weight loss will be strongly beneficial to reduce the severity of sleep-disordered breathing.  Caution during activities that require prolonged concentration is strongly advised if sleepiness returns. Changing of PAP supplies regularly is important for effective use. I will send order to [facility] to renew all of her CPAP supplies.      Follow up with Dr. Sheriff in one year    Thank you for allowing me to participate in your patient's care.      QAMAR Braga  [facility]  Phone: [phone]      Part of this note may be an electronic transcription/translation of spoken language to printed text using the Dragon Dictation System.

## 2023-06-01 ENCOUNTER — APPOINTMENT (OUTPATIENT)
Dept: WOMENS IMAGING | Facility: HOSPITAL | Age: 73
End: 2023-06-01
Payer: MEDICARE

## 2023-06-01 PROCEDURE — 77067 SCR MAMMO BI INCL CAD: CPT | Performed by: RADIOLOGY

## 2023-06-01 PROCEDURE — 77063 BREAST TOMOSYNTHESIS BI: CPT | Performed by: RADIOLOGY

## 2023-06-02 ENCOUNTER — TELEPHONE (OUTPATIENT)
Dept: GASTROENTEROLOGY | Facility: CLINIC | Age: 73
End: 2023-06-02

## 2023-06-02 NOTE — TELEPHONE ENCOUNTER
Caller: Sravanthi Burns    Relationship to patient: Self    Best call back number: [phone]    Patient is needing: A CALLBACK REGARDING THAT SHE'S HAD DIARRHEA FOR 8 DAYS NOW AND IS CONCERNED THAT IT WILL GET WORSE.    PT IS SCHEDULED TO SEE NATALIIA ON 6/20/23.    PT REQUESTS CALLBACK AND IS OK TO [facility]. PT HAS A 2PM APPT TODAY BUT SAID TO PLEASE [facility] IF SHE MISSES CALL.

## 2023-06-05 RX ORDER — BUDESONIDE 3 MG/1
CAPSULE, COATED PELLETS ORAL
Qty: 180 CAPSULE | Refills: 0 | Status: SHIPPED | OUTPATIENT
Start: 2023-06-05 | End: 2023-09-03

## 2023-06-05 NOTE — TELEPHONE ENCOUNTER
"Pt stated she has been having diarrhea for the last 2 weeks.  She said the diarrhea is constant and she \"can't go anywhere or do anything\"  she is taking imodium and Ibgard.  She denies temp or blood.    She said last time she did this she had colitis.  She wonders if this is the same.  She thought she was placed on a steroid at that time.    She is scheduled to see olaf 6/20/23, is there anything you would like for her to do between now and then?  "

## 2023-06-05 NOTE — TELEPHONE ENCOUNTER
That is correct, she had a colonoscopy in November with pathology showing microscopic colitis.  She was started on budesonide at that time.  She was supposed to follow-up but never did.  I will send in the budesonide however she needs to keep her follow-up with me on 6/20/2023 or I cannot prescribe again.

## 2023-09-20 ENCOUNTER — OFFICE VISIT (OUTPATIENT)
Dept: GASTROENTEROLOGY | Facility: CLINIC | Age: 73
End: 2023-09-20
Payer: MEDICARE

## 2023-09-20 VITALS
OXYGEN SATURATION: 96 % | BODY MASS INDEX: 35.98 KG/M2 | SYSTOLIC BLOOD PRESSURE: 136 MMHG | HEIGHT: 58 IN | DIASTOLIC BLOOD PRESSURE: 75 MMHG | HEART RATE: 90 BPM | WEIGHT: 171.4 LBS | TEMPERATURE: 97.1 F

## 2023-09-20 DIAGNOSIS — K52.832 LYMPHOCYTIC COLITIS: Primary | ICD-10-CM

## 2023-09-20 DIAGNOSIS — K29.70 GASTRITIS WITHOUT BLEEDING, UNSPECIFIED CHRONICITY, UNSPECIFIED GASTRITIS TYPE: ICD-10-CM

## 2023-09-20 RX ORDER — KETOCONAZOLE 20 MG/G
CREAM TOPICAL
COMMUNITY

## 2023-09-20 RX ORDER — ACETAMINOPHEN 325 MG/1
325 TABLET ORAL EVERY 6 HOURS PRN
COMMUNITY

## 2023-09-20 RX ORDER — LIOTHYRONINE SODIUM 5 UG/1
10 TABLET ORAL DAILY
COMMUNITY

## 2023-09-20 RX ORDER — OMEPRAZOLE 20 MG/1
20 CAPSULE, DELAYED RELEASE ORAL DAILY
Qty: 90 CAPSULE | Refills: 3 | Status: SHIPPED | OUTPATIENT
Start: 2023-09-20

## 2023-09-20 RX ORDER — KETOCONAZOLE 20 MG/ML
SHAMPOO TOPICAL
COMMUNITY
Start: 2023-08-16

## 2023-09-20 RX ORDER — DIPHENOXYLATE HYDROCHLORIDE AND ATROPINE SULFATE 2.5; .025 MG/1; MG/1
1 TABLET ORAL
COMMUNITY

## 2023-09-20 RX ORDER — IMIPRAMINE PAMOATE 75 MG
CAPSULE ORAL
COMMUNITY

## 2023-09-20 NOTE — PROGRESS NOTES
"Chief Complaint  Lymphocytic colitis    Subjective          History of Present Illness    Sravanthi Burns is a  72 y.o. female presents for follow-up on diarrhea with history of microscopic colitis.  She is a patient Dr. Araya last seen by me on 6/20/2023.    At last visit her diarrhea was doing much better on budesonide however she was having some trouble with swelling in her legs and face presumably due to steroid effect as well as orthopnea.  For this reason she was changed to Pepto-Bismol chewables.  She is off budesonide and stopped peptobismol 3 weeks ago. She has had normal Bms for 3 weeks. Occasional abdominal discomfort prior to Bms, relieved with BM. She does take 2 IBgard every morning. No blood in stool. She is doing well.     She has history of gastritis with intermittent GERD and dyspeptic symptoms.  She is on Omeprazole 40mg daily which works well for her.     From 6/20/2023 office note:  11/29/2022 EGD showed LA grade a esophagitis, mild gastritis.  Pathology showed peptic duodenitis, mild chronic inactive gastritis on biopsies, negative H. pylori.  Colonoscopy showed moderately erythematous mucosa in sigmoid colon, 2 polyps: Adenomatous, diverticulosis, pathology positive for lymphocytic colitis.  Recall 7 years per Dr. Araya's 1/17/2023 office note    She has a history of CKD stage III, bordering on stage IV.    Objective   Vital Signs:   /75   Pulse 90   Temp 97.1 °F (36.2 °C)   Ht 147.3 cm (58\")   Wt 77.7 kg (171 lb 6.4 oz)   SpO2 96%   BMI 35.82 kg/m²       Physical Exam  Vitals reviewed.   Constitutional:       General: She is awake. She is not in acute distress.     Appearance: Normal appearance. She is well-developed and well-groomed.   HENT:      Head: Normocephalic.   Pulmonary:      Effort: Pulmonary effort is normal. No respiratory distress.   Skin:     Coloration: Skin is not pale.   Neurological:      Mental Status: She is alert and oriented to person, place, and time.     "  Gait: Gait is intact.   Psychiatric:         Mood and Affect: Mood and affect normal.         Speech: Speech normal.         Behavior: Behavior is cooperative.         Judgment: Judgment normal.        Result Review :             Assessment and Plan    Diagnoses and all orders for this visit:    1. Lymphocytic colitis (Primary)    2. Gastritis without bleeding, unspecified chronicity, unspecified gastritis type    Other orders  -     omeprazole (priLOSEC) 20 MG capsule; Take 1 capsule by mouth Daily.  Dispense: 90 capsule; Refill: 3    Start fiber supplement. Take peptobismol for flares of diarrhea.     OK to continue PPI, reduce Omeprazole to 20mg daily.     Follow Up   Return in about 6 months (around 3/20/2024) for Osman Fernandes dictation used throughout this note.     Eveline Arora PA-C

## 2023-10-16 RX ORDER — OMEPRAZOLE 40 MG/1
40 CAPSULE, DELAYED RELEASE ORAL DAILY
Qty: 90 CAPSULE | Refills: 3 | Status: SHIPPED | OUTPATIENT
Start: 2023-10-16

## 2023-10-25 ENCOUNTER — OFFICE VISIT (OUTPATIENT)
Dept: INTERNAL MEDICINE | Facility: CLINIC | Age: 73
End: 2023-10-25
Payer: MEDICARE

## 2023-10-25 VITALS
SYSTOLIC BLOOD PRESSURE: 140 MMHG | DIASTOLIC BLOOD PRESSURE: 70 MMHG | BODY MASS INDEX: 36.59 KG/M2 | WEIGHT: 174.3 LBS | OXYGEN SATURATION: 98 % | HEART RATE: 100 BPM | HEIGHT: 58 IN

## 2023-10-25 DIAGNOSIS — E07.9 THYROID EYE DISEASE: ICD-10-CM

## 2023-10-25 DIAGNOSIS — E05.00 GRAVES DISEASE: ICD-10-CM

## 2023-10-25 DIAGNOSIS — L65.9 HAIR LOSS: ICD-10-CM

## 2023-10-25 DIAGNOSIS — H57.89 THYROID EYE DISEASE: ICD-10-CM

## 2023-10-25 DIAGNOSIS — E66.01 CLASS 2 SEVERE OBESITY DUE TO EXCESS CALORIES WITH SERIOUS COMORBIDITY AND BODY MASS INDEX (BMI) OF 36.0 TO 36.9 IN ADULT: Primary | ICD-10-CM

## 2023-10-25 DIAGNOSIS — E11.65 TYPE 2 DIABETES MELLITUS WITH HYPERGLYCEMIA, WITHOUT LONG-TERM CURRENT USE OF INSULIN: ICD-10-CM

## 2023-10-25 DIAGNOSIS — I10 PRIMARY HYPERTENSION: ICD-10-CM

## 2023-10-25 PROBLEM — M25.512 PAIN OF BOTH SHOULDER JOINTS: Status: ACTIVE | Noted: 2022-09-29

## 2023-10-25 PROBLEM — M75.41 IMPINGEMENT SYNDROME OF RIGHT SHOULDER REGION: Status: ACTIVE | Noted: 2023-10-24

## 2023-10-25 PROBLEM — E11.9 TYPE 2 DIABETES MELLITUS WITHOUT COMPLICATION, WITHOUT LONG-TERM CURRENT USE OF INSULIN: Status: ACTIVE | Noted: 2022-08-11

## 2023-10-25 PROBLEM — K52.9 CHRONIC DIARRHEA: Status: ACTIVE | Noted: 2023-01-06

## 2023-10-25 PROBLEM — M54.50 CHRONIC LOW BACK PAIN: Status: ACTIVE | Noted: 2023-03-26

## 2023-10-25 PROBLEM — M54.16 LUMBAR RADICULOPATHY: Status: ACTIVE | Noted: 2023-03-26

## 2023-10-25 PROBLEM — M25.519 SHOULDER PAIN: Status: ACTIVE | Noted: 2022-09-29

## 2023-10-25 PROBLEM — M77.12 LATERAL EPICONDYLITIS OF LEFT ELBOW: Status: ACTIVE | Noted: 2022-07-12

## 2023-10-25 PROBLEM — N18.32 STAGE 3B CHRONIC KIDNEY DISEASE: Status: ACTIVE | Noted: 2022-08-11

## 2023-10-25 PROBLEM — M25.511 PAIN OF BOTH SHOULDER JOINTS: Status: ACTIVE | Noted: 2022-09-29

## 2023-10-25 PROBLEM — G89.29 CHRONIC LOW BACK PAIN: Status: ACTIVE | Noted: 2023-03-26

## 2023-10-25 PROBLEM — M47.816 LUMBAR SPONDYLOSIS: Status: ACTIVE | Noted: 2023-03-26

## 2023-10-25 PROBLEM — M75.52 SUBACROMIAL BURSITIS OF LEFT SHOULDER JOINT: Status: ACTIVE | Noted: 2022-09-29

## 2023-10-25 PROBLEM — I12.9 BENIGN HYPERTENSION WITH CHRONIC KIDNEY DISEASE: Status: ACTIVE | Noted: 2022-11-16

## 2023-10-25 PROBLEM — M75.51 SUBACROMIAL BURSITIS OF RIGHT SHOULDER JOINT: Status: ACTIVE | Noted: 2022-09-29

## 2023-10-25 PROBLEM — E78.2 MIXED HYPERLIPIDEMIA: Status: ACTIVE | Noted: 2022-11-16

## 2023-10-25 PROBLEM — M47.816 ARTHROPATHY OF LUMBAR FACET JOINT: Status: ACTIVE | Noted: 2023-03-26

## 2023-10-25 PROBLEM — N17.9 ACUTE KIDNEY INJURY: Status: ACTIVE | Noted: 2022-11-16

## 2023-10-25 PROBLEM — E66.9 OBESITY: Status: ACTIVE | Noted: 2018-05-10

## 2023-10-25 PROBLEM — E11.22 TYPE 2 DIABETES MELLITUS WITH DIABETIC CHRONIC KIDNEY DISEASE: Chronic | Status: ACTIVE | Noted: 2022-11-16

## 2023-10-25 LAB
ALBUMIN SERPL-MCNC: 4.6 G/DL (ref 3.5–5.2)
ALBUMIN/GLOB SERPL: 2.1 G/DL
ALP SERPL-CCNC: 181 U/L (ref 39–117)
ALT SERPL-CCNC: 13 U/L (ref 1–33)
AST SERPL-CCNC: 12 U/L (ref 1–32)
BASOPHILS # BLD AUTO: 0.07 10*3/MM3 (ref 0–0.2)
BASOPHILS NFR BLD AUTO: 0.4 % (ref 0–1.5)
BILIRUB SERPL-MCNC: 0.4 MG/DL (ref 0–1.2)
BUN SERPL-MCNC: 23 MG/DL (ref 8–23)
BUN/CREAT SERPL: 15.5 (ref 7–25)
CALCIUM SERPL-MCNC: 10.2 MG/DL (ref 8.6–10.5)
CHLORIDE SERPL-SCNC: 101 MMOL/L (ref 98–107)
CO2 SERPL-SCNC: 24.7 MMOL/L (ref 22–29)
CREAT SERPL-MCNC: 1.48 MG/DL (ref 0.57–1)
EGFRCR SERPLBLD CKD-EPI 2021: 37.7 ML/MIN/1.73
EOSINOPHIL # BLD AUTO: 0.01 10*3/MM3 (ref 0–0.4)
EOSINOPHIL NFR BLD AUTO: 0.1 % (ref 0.3–6.2)
ERYTHROCYTE [DISTWIDTH] IN BLOOD BY AUTOMATED COUNT: 13.5 % (ref 12.3–15.4)
GLOBULIN SER CALC-MCNC: 2.2 GM/DL
GLUCOSE SERPL-MCNC: 184 MG/DL (ref 65–99)
HBA1C MFR BLD: 6.1 % (ref 4.8–5.6)
HCT VFR BLD AUTO: 44.1 % (ref 34–46.6)
HGB BLD-MCNC: 14.9 G/DL (ref 12–15.9)
IMM GRANULOCYTES # BLD AUTO: 0.21 10*3/MM3 (ref 0–0.05)
IMM GRANULOCYTES NFR BLD AUTO: 1.3 % (ref 0–0.5)
LYMPHOCYTES # BLD AUTO: 2.04 10*3/MM3 (ref 0.7–3.1)
LYMPHOCYTES NFR BLD AUTO: 12.4 % (ref 19.6–45.3)
MCH RBC QN AUTO: 29.6 PG (ref 26.6–33)
MCHC RBC AUTO-ENTMCNC: 33.8 G/DL (ref 31.5–35.7)
MCV RBC AUTO: 87.5 FL (ref 79–97)
MONOCYTES # BLD AUTO: 0.61 10*3/MM3 (ref 0.1–0.9)
MONOCYTES NFR BLD AUTO: 3.7 % (ref 5–12)
NEUTROPHILS # BLD AUTO: 13.49 10*3/MM3 (ref 1.7–7)
NEUTROPHILS NFR BLD AUTO: 82.1 % (ref 42.7–76)
NRBC BLD AUTO-RTO: 0 /100 WBC (ref 0–0.2)
PLATELET # BLD AUTO: 472 10*3/MM3 (ref 140–450)
POTASSIUM SERPL-SCNC: 4.8 MMOL/L (ref 3.5–5.2)
PROT SERPL-MCNC: 6.8 G/DL (ref 6–8.5)
RBC # BLD AUTO: 5.04 10*6/MM3 (ref 3.77–5.28)
SODIUM SERPL-SCNC: 138 MMOL/L (ref 136–145)
TSH SERPL DL<=0.005 MIU/L-ACNC: 0.49 UIU/ML (ref 0.27–4.2)
WBC # BLD AUTO: 16.43 10*3/MM3 (ref 3.4–10.8)

## 2023-10-25 RX ORDER — DULAGLUTIDE 0.75 MG/.5ML
0.75 INJECTION, SOLUTION SUBCUTANEOUS
Qty: 2 ML | Refills: 2 | Status: SHIPPED | OUTPATIENT
Start: 2023-10-25

## 2023-10-25 NOTE — PROGRESS NOTES
"  Leon Layton M.D.  Internal Medicine  [facility]  [address], [address]  [place] [address]  [phone]      Chief Complaint  Establish Care, Hypertension, Hyperlipidemia, and Med Refill (Trulicity /)    SUBJECTIVE    History of Present Illness    Sravanthi Burns is a 73 y.o. female with CKD, hypothyroidism, GERD, hyperlipidemia, hypertension who presents to the office today as a new patient to establish care.     Follows with gastroenterology for microscopic colitis and gastritis with GERD.    Stage III CKD-follows with [facility]    Type 2 diabetes on Trulicity 0.75 mg weekly.  Her previous PCP declined to refer her to weight loss clinic for semaglutide.  Her PCP offered to change the dose of Trulicity or switch medications.She was referred to endocrinology but wants to go to endocrinology at [facility].  Today she states home BG is 120-140. Denies polyuria.      She is on low carb, low sugar diet. She does not exercise because she \"hates exercise.\" Has used private  in the past.  States \"I don't eat that much\".    [facility] is [facility] to [facility]". Had Lap Band without improvement in weight. She wants to lose 70 lbs. Did not lose weight with Trulicity.     States Ozempic was too expensive through Medicare.     Hypothyroidism-History of Graves disease. On replacement. 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     History of Gout    Hypertension-on valsartan and atenolol. Blood pressure 120-130/60.     Hyperlipidemia-on atorvastatin    More trouble sleeping lately.     Hair falling out-previously on topical minoxidil and finasteride for this    History of DVT on the left     Social-retired    Review of Systems    Allergies   Allergen Reactions    Other Dermatitis, Itching and Swelling     \"surgical glue\"    Wound Dressing Adhesive Itching    Duloxetine Other (See Comments)     Other reaction(s): Decreased " libido  DECREASED LIBIDO    Erythromycin Nausea Only    Formaldehyde Itching    Minocycline Swelling     GENERALIZED    Nsaids Other (See Comments)     Avoids due to CKD     Tea Tree Oil Other (See Comments)     Found during allergy test    Venlafaxine Other (See Comments)     Other reaction(s): Decreased libido  DECREASED LIBIDO.        Outpatient Medications Marked as Taking for the 10/25/23 encounter (Office Visit) with Leon Layton MD   Medication Sig Dispense Refill    acetaminophen (TYLENOL) 325 MG tablet Take 1 tablet by mouth Every 6 (Six) Hours As Needed.      allopurinol (ZYLOPRIM) 300 MG tablet Daily.      atenolol (TENORMIN) 50 MG tablet TAKE ONE TABLET BY MOUTH DAILY 30 tablet 0    atorvastatin (LIPITOR) 20 MG tablet       bismuth subsalicylate (Pepto-Bismol) 262 MG chewable tablet Chew 2 tablets 2 (Two) Times a Day. 120 tablet 3    Calcium-Vitamin D-Vitamin K 500-500-40 MG-UNT-MCG chewable tablet Chew.      Cholecalciferol (VITAMIN D3) 5000 units chewable tablet Chew Daily.      diphenoxylate-atropine (LOMOTIL) 2.5-0.025 MG per tablet Take 1 tablet by mouth.      Dulaglutide (Trulicity) 0.75 MG/0.5ML solution pen-injector Inject 0.75 mg under the skin into the appropriate area as directed Every 7 (Seven) Days. 2 mL 2    estradiol (MINIVELLE, VIVELLE-DOT) 0.05 MG/24HR patch 2 (two) times a week      finasteride (PROSCAR) 5 MG tablet       furosemide (LASIX) 40 MG tablet       glucose blood test strip 1 each by Other route Daily.      imipramine (TOFRANIL-PM) 75 MG capsule 1 cap(s) orally once a day (at bedtime)      ketoconazole (NIZORAL) 2 % shampoo       levothyroxine (SYNTHROID, LEVOTHROID) 100 MCG tablet       metroNIDAZOLE (METROGEL) 0.75 % gel metronidazole 0.75 % topical gel      Nutritional Supplements (Silica) 12.5 MG capsule       omeprazole (priLOSEC) 40 MG capsule Take 1 capsule by mouth Daily. 90 capsule 3    Peppermint Oil (IBgard) 90 MG capsule controlled-release       Probiotic Product  capsule       Progesterone (PROMETRIUM) 100 MG capsule       valsartan (DIOVAN) 160 MG tablet Take 2 tablets by mouth Daily.      [DISCONTINUED] Dulaglutide (Trulicity) 0.75 MG/0.5ML solution pen-injector Inject 0.75 mg under the skin into the appropriate area as directed Every 7 (Seven) Days.          Past Medical History:   Diagnosis Date    Anxiety     Cardiomegaly     MILD    Cataract     Chronic kidney disease     Colon polyps     FOLLOWED BY DR. LUCY HILL    DDD (degenerative disc disease), lumbar     Diabetes mellitus 2021    Disease of thyroid gland     HYPOTHYROIDISM    Diverticulitis of colon     Elevated cholesterol     Excessive sleepiness     Fatty liver     GERD (gastroesophageal reflux disease)     Gout     Hard to intubate     Hot flashes     HPV in female     Hyperlipidemia     Hypertension     Hypothyroidism     Insomnia     Low back pain     Lumbar radiculopathy     Lung disease     Obesity     DELIA (obstructive sleep apnea)     Panic disorder     Postmenopausal HRT (hormone replacement therapy)     PROGESTERONE AND ESTRADIAL    Pulmonary nodules     RIGHT MIDDLE LOBE    Renal insufficiency     Rosacea     Tachycardia      Past Surgical History:   Procedure Laterality Date    BARIATRIC SURGERY  2006    Removed lapband 2010    CHOLECYSTECTOMY N/A 03/12/2002    WITH CHOLANGIOGRAM, DR. LYNNETTE CHAVEZ AT [facility]    COLONOSCOPY N/A 10/04/2016    4 SESSILE TUBULAR ADENOMA POLYPS IN HEPATIC FLEXURE, 1 SESSILE TUBULAR ADENOMA AND 1 HYPERPLASTIC POLYP IN RECTUM, 6 MM SESSILE HYPERPLASTIC POLYP IN SIGMOID, 2 SESSILE HYPERPLASTI POLYPS IN RECTUM, MULTIPLE SMALL AND LARGE DIVERTICULA, RESCOPE IN 3 YRS, DR. LUCY HILL AT [facility]    COLONOSCOPY N/A 08/19/2003    ENTIRE COLON WNL, DR. ARIAS THOMASON AT [facility]    COLONOSCOPY N/A 10/07/2008    MULTIPLE LARGE MOUTHED DIVERTICULA IN SIGMOID, RESCOPE IN 5 YRS, DR. ARIAS THOMASON AT [facility]    COLONOSCOPY N/A 06/21/2011    DIVERTICULOSIS, MILD COLONIC SPASM, RESCOPE IN 5 YRS,  DR. ARIAS THOMASON AT [facility]    COLONOSCOPY N/A 10/10/2019    5 MM HYPERPLASTIC POLYP IN CECUM, 5 MM HYPERPLASTIC POLYP IN ASCENDING, 5 MM HYPERPLASTIC POLYP IN TRANSVERSE, 5 MM HYPERPLASTIC POLYP IN DESCENDING, RESCOPE IN 3 YRS, DR. LUCY HILL AT [facility]    COLONOSCOPY N/A 11/29/2022    Procedure: COLONOSCOPY to cecum and TI;  with biopsies, cold bx polyps,;  Surgeon: Kathy Araya MD;  Location:  [facility];  Service: Gastroenterology;  Laterality: N/A;  pre:  Diarrhea, left lower quadrant pain, abnormal CT scan of the sigmoid colon  post:  polyps, diverticulosis, abnormal colon mucosa, hemorrhoids    COSMETIC SURGERY N/A 1995    ABDOMENOPLASTY AND LIPOSUCTION, DR. MAUREEN [facility]    COSMETIC SURGERY Bilateral 1997    ARM REDUCTION, BRACHIOPLASTY, DR. MAUREEN [facility]    COSMETIC SURGERY Bilateral     UPPER EYELID BLEPHAROPLASTY    COSMETIC SURGERY N/A 10/13/2000    LIPECTOMY OF ABDOMINAL WALL, DR. MAUREEN [facility]    CYSTOSCOPY N/A 03/19/2014    DR. JERMAINE MURRIETA AT [facility]    DIAGNOSTIC LAPAROSCOPY N/A 06/04/2010    DEBRIDEMENT OF ABDOMINAL WALL, DR. SILVERIO AT [facility]    DILATATION AND CURETTAGE N/A 02/2003    ENDOSCOPY N/A 01/13/2006    REFLUX ESOPHAGITIS, OTHERWISE WNL, DR. JOSE J WELLS AT [facility]    ENDOSCOPY N/A 11/29/2022    Procedure: ESOPHAGOGASTRODUODENOSCOPY iwth biopsies;  Surgeon: Kathy Araya MD;  Location: [facility];  Service: Gastroenterology;  Laterality: N/A;  Pre:  epigastric pain  post:  gastritis, esophagitis,     GASTRIC BANDING REMOVAL N/A 07/2011    GASTRIC PORT CHANGE N/A     DR. SILVERIO AT [facility]    HERNIA REPAIR N/A 03/12/2002    VENTRAL HERNIA REPAIR, DR. KAITLYNN CHAVEZ AT [facility]    LAPAROSCOPIC GASTRIC BANDING N/A 02/21/2017    DR. SILVERIO AT [facility]    LAPAROSCOPIC LYSIS OF ADHESIONS N/A 11/05/2018    DR. LORI HILL AT [place]    SHOULDER ARTHROSCOPY W/ LABRAL REPAIR Left     AND DEBRIDEMENT OF ROTATOR CUFF    TENSION FREE  "VAGINAL TAPING WITH MINI ARC SLING N/A 03/19/2004    DR. JERMAINE MURRIETA AT [facility]    TUBAL ABDOMINAL LIGATION Bilateral 1982    UPPER GASTROINTESTINAL ENDOSCOPY  2022     Family History   Problem Relation Age of Onset    Heart murmur Mother     Hyperlipidemia Mother     Heart disease Mother     Hypertension Mother     Alcohol abuse Father     Cancer Father     Liver disease Father     Cirrhosis Father     COPD Father     Hypertension Sister     Heart disease Sister     Heart disease Brother     Basal cell carcinoma Brother     Hypertension Brother     Cancer Brother     Diabetes Brother     Valvular heart disease Daughter     No Known Problems Son     Cancer Maternal Grandmother     Suicidality Paternal Grandfather     reports that she quit smoking about 31 years ago. Her smoking use included cigarettes. She has a 10.00 pack-year smoking history. She has never used smokeless tobacco. She reports current alcohol use. She reports that she does not use drugs.    OBJECTIVE    Vital Signs:   /70   Pulse 100   Ht 147.3 cm (57.99\")   Wt 79.1 kg (174 lb 4.8 oz)   SpO2 98%   BMI 36.44 kg/m²     Physical Exam  Constitutional:       Appearance: Normal appearance.   Cardiovascular:      Rate and Rhythm: Normal rate and regular rhythm.      Heart sounds: Normal heart sounds. No murmur heard.  Pulmonary:      Effort: Pulmonary effort is normal.      Breath sounds: Normal breath sounds.   Abdominal:      General: Abdomen is flat. There is no distension.      Palpations: Abdomen is soft.      Tenderness: There is no abdominal tenderness.   Musculoskeletal:      Right lower leg: No edema.      Left lower leg: No edema.   Skin:     General: Skin is warm and dry.   Neurological:      Mental Status: She is alert.   Psychiatric:         Mood and Affect: Mood normal.         Behavior: Behavior normal.         Thought Content: Thought content normal.          The following data was reviewed by: Leon Layton MD on 10/25/2023:    [facility] " w/diff          3/31/2023    10:06   CBC w/Diff   WBC 10.25       RBC 4.49       Hemoglobin 13.6       Hematocrit 41.8       MCV 93.1       MCH 30.3       MCHC 32.5       RDW 14.4       Platelets 464       Neutrophil Rel % 51.7       Immature Granulocyte Rel % 1.5       Lymphocyte Rel % 33.1       Monocyte Rel % 8.8       Eosinophil Rel % 4.1       Basophil Rel % 0.8          Details          This result is from an external source.                 A1C Last 3 Results          3/31/2023    10:06   HGBA1C Last 3 Results   Hemoglobin A1C 5.8          Details          This result is from an external source.             Data reviewed : Previous PCP note              ASSESSMENT & PLAN     Diagnoses and all orders for this visit:    1. Class 2 severe obesity due to excess calories with serious comorbidity and body mass index (BMI) of 36.0 to 36.9 in adult (Primary)  -     Ambulatory Referral to Endocrinology  -     CBC & Differential    I declined to write requested letter for patient today for weight loss center.  I am uncomfortable with many of these weight loss centers which often use compounded semaglutide is not FDA approved.  It would be preferable for her to start Ozempic for diabetes which I would be comfortable prescribing.  Discussed that Trulicity is in the same drug class as Ozempic so she would need to stop Trulicity.  Discussed that Ozempic and Trulicity have a similar side effect profile with nausea and constipation being the most common side effects.  Discussed that Wegovy is the version of semaglutide that is FDA approved for weight loss.  Discussed that this would probably not be covered by her insurance but Ozempic may.  Her diabetes is under good control.  She does not need any adjustment to her medications to improve glucose control.  She states her weight loss center recommended she be referred to endocrinology.  She is going to reach out to her insurance to see how much Ozempic may cost.  We will  continue Trulicity for now.    Recommended to patient: exercise a minimum of 150 minutes per week at a brisk walk or more; nutritionist consultation; calorie counting (many successful apps vs tracking on paper); structured diet programs such as Weight Watchers or Riana Nielson for those who prefer; behavioral therapy such as [employer] or in person cognitive behavioral therapy; avoid OTC dietary supplements as there is limited evidence and safety data.     Discussed that as a shorter, postmenopausal woman her recommended daily calorie intake is going to be lower than most.    1350  calories  Inactive    1500  calories  Somewhat Active    1600  calories  Active    1900  calories  Very Active      2. Type 2 diabetes mellitus with hyperglycemia, without long-term current use of insulin  -     Ambulatory Referral to Endocrinology  -     Hemoglobin A1c  -     TSH Rfx On Abnormal To Free T4  -     CBC & Differential  -     Dulaglutide (Trulicity) 0.75 MG/0.5ML solution pen-injector; Inject 0.75 mg under the skin into the appropriate area as directed Every 7 (Seven) Days.  Dispense: 2 mL; Refill: 2    3. Graves disease  -     Ambulatory Referral to Endocrinology    4. Thyroid eye disease  -     Ambulatory Referral to Endocrinology    5. Primary hypertension  -     CBC & Differential  -     Comprehensive Metabolic Panel    6. Hair loss    Her TSH has been outside normal range recently.  We will check today.  I am not familiar with the medication she was using previously for hair loss.  I recommend she try Rogaine for hair loss.       Health Maintenance Due   Topic Date Due    BMI FOLLOWUP  Never done    Pneumococcal Vaccine 65+ (1 - PCV) Never done    TDAP/TD VACCINES (1 - Tdap) Never done    ZOSTER VACCINE (1 of 2) Never done    DIABETIC FOOT EXAM  Never done    DIABETIC EYE EXAM  Never done    INFLUENZA VACCINE  Never done    HEMOGLOBIN A1C  09/30/2023        Follow Up  Return in about 3 months (around 1/25/2024) for  Recheck.    Patient/family had no further questions at this time and verbalized understanding of the plan discussed today.

## 2023-10-25 NOTE — PATIENT INSTRUCTIONS
-Recommended to patient: exercise a minimum of 150 minutes per week at a brisk walk or more; nutritionist consultation; calorie counting (many successful apps vs tracking on paper); structured diet programs such as Weight Watchers or Riana Nielson for those who prefer; behavioral therapy such as [employer] or in person cognitive behavioral therapy; avoid OTC dietary supplements as there is limited evidence and safety data; consider OTC All? (orlistat) 60 mg daily, discussed contraindications, common side effects and usage instructions.     1350  calories  Inactive    1500  calories  Somewhat Active    1600  calories  Active    1900  calories  Very Active    -Continue to monitor every 2-3 months for progress and barriers. Consider prescription weight loss medication if no improvement.

## 2023-10-26 ENCOUNTER — PATIENT ROUNDING (BHMG ONLY) (OUTPATIENT)
Dept: INTERNAL MEDICINE | Facility: CLINIC | Age: 73
End: 2023-10-26
Payer: MEDICARE

## 2023-10-26 DIAGNOSIS — D72.829 LEUKOCYTOSIS, UNSPECIFIED TYPE: Primary | ICD-10-CM

## 2023-10-26 PROBLEM — K76.0 HEPATIC STEATOSIS: Status: ACTIVE | Noted: 2023-10-26

## 2023-10-26 NOTE — PROGRESS NOTES
October 26, 2023        CHETAN PEÑA [facility]  [address] [address] [phone].      Rounded with & welcomed patient during rooming, check in/out.  Patient will follow up at next scheduled office visit.    No

## 2023-10-27 ENCOUNTER — TELEPHONE (OUTPATIENT)
Dept: INTERNAL MEDICINE | Facility: CLINIC | Age: 73
End: 2023-10-27
Payer: MEDICARE

## 2023-10-27 NOTE — TELEPHONE ENCOUNTER
Patient wanted clarification to see if she needs to come in for more lab work? She review all results on Mychart

## 2023-10-27 NOTE — TELEPHONE ENCOUNTER
----- Message from Leon Layton MD sent at 10/26/2023  5:33 PM EDT -----  Please call patient with lab results.  Her white blood cell count was elevated.  I am wondering if she has any signs or symptoms of infection such as fevers, chills, cough, UTI symptoms.  We should recheck this in 2 weeks.  Her kidney function is slightly decreased from previous.  She should make sure that she is staying hydrated by drinking lots of water.  Her alk phos is elevated.  This has been elevated the last several times it has been checked.  I am adding on a lab to see if this is from her liver or her bone.  Her thyroid function is within goal range.  Her hemoglobin A1c is within goal range.  She does not need any changes in her medications at this point.

## 2023-10-27 NOTE — TELEPHONE ENCOUNTER
I would like for her to return for a repeat CBC in 2 weeks.  I am adding on the GGT to her existing blood work.

## 2023-11-14 DIAGNOSIS — D72.829 LEUKOCYTOSIS, UNSPECIFIED TYPE: Primary | ICD-10-CM

## 2023-11-16 ENCOUNTER — LAB (OUTPATIENT)
Dept: LAB | Facility: HOSPITAL | Age: 73
End: 2023-11-16
Payer: MEDICARE

## 2023-11-16 ENCOUNTER — CONSULT (OUTPATIENT)
Dept: ONCOLOGY | Facility: CLINIC | Age: 73
End: 2023-11-16
Payer: MEDICARE

## 2023-11-16 VITALS
TEMPERATURE: 97.3 F | SYSTOLIC BLOOD PRESSURE: 140 MMHG | DIASTOLIC BLOOD PRESSURE: 77 MMHG | HEIGHT: 58 IN | WEIGHT: 162.4 LBS | BODY MASS INDEX: 34.09 KG/M2 | OXYGEN SATURATION: 98 % | HEART RATE: 96 BPM

## 2023-11-16 DIAGNOSIS — D72.825 BANDEMIA: ICD-10-CM

## 2023-11-16 DIAGNOSIS — D75.839 THROMBOCYTOSIS: ICD-10-CM

## 2023-11-16 DIAGNOSIS — D75.839 THROMBOCYTOSIS: Primary | ICD-10-CM

## 2023-11-16 DIAGNOSIS — D72.829 LEUKOCYTOSIS, UNSPECIFIED TYPE: ICD-10-CM

## 2023-11-16 LAB
ALBUMIN SERPL-MCNC: 3.9 G/DL (ref 3.5–5.2)
ALBUMIN/GLOB SERPL: 1.4 G/DL
ALP SERPL-CCNC: 176 U/L (ref 39–117)
ALT SERPL W P-5'-P-CCNC: 19 U/L (ref 1–33)
ANION GAP SERPL CALCULATED.3IONS-SCNC: 16.5 MMOL/L (ref 5–15)
AST SERPL-CCNC: 12 U/L (ref 1–32)
BASOPHILS # BLD AUTO: 0.04 10*3/MM3 (ref 0–0.2)
BASOPHILS NFR BLD AUTO: 0.2 % (ref 0–1.5)
BILIRUB SERPL-MCNC: 0.6 MG/DL (ref 0–1.2)
BUN SERPL-MCNC: 41 MG/DL (ref 8–23)
BUN/CREAT SERPL: 21.9 (ref 7–25)
CALCIUM SPEC-SCNC: 9.5 MG/DL (ref 8.6–10.5)
CHLORIDE SERPL-SCNC: 101 MMOL/L (ref 98–107)
CO2 SERPL-SCNC: 26.5 MMOL/L (ref 22–29)
CREAT SERPL-MCNC: 1.87 MG/DL (ref 0.6–1.1)
CRP SERPL-MCNC: <0.3 MG/DL (ref 0–0.5)
DEPRECATED RDW RBC AUTO: 42.9 FL (ref 37–54)
EGFRCR SERPLBLD CKD-EPI 2021: 28.5 ML/MIN/1.73
EOSINOPHIL # BLD AUTO: 0.16 10*3/MM3 (ref 0–0.4)
EOSINOPHIL NFR BLD AUTO: 1 % (ref 0.3–6.2)
ERYTHROCYTE [DISTWIDTH] IN BLOOD BY AUTOMATED COUNT: 13.8 % (ref 12.3–15.4)
ERYTHROCYTE [SEDIMENTATION RATE] IN BLOOD: 3 MM/HR (ref 0–30)
FERRITIN SERPL-MCNC: 230 NG/ML (ref 13–150)
FOLATE SERPL-MCNC: 10.3 NG/ML (ref 4.78–24.2)
GLOBULIN UR ELPH-MCNC: 2.7 GM/DL
GLUCOSE SERPL-MCNC: 124 MG/DL (ref 65–99)
HCT VFR BLD AUTO: 44 % (ref 34–46.6)
HGB BLD-MCNC: 15.3 G/DL (ref 12–15.9)
HGB RETIC QN AUTO: 36.3 PG (ref 29.8–36.1)
IMM GRANULOCYTES # BLD AUTO: 0.23 10*3/MM3 (ref 0–0.05)
IMM GRANULOCYTES NFR BLD AUTO: 1.4 % (ref 0–0.5)
IMM RETICS NFR: 5.8 % (ref 3–15.8)
IRON 24H UR-MRATE: 199 MCG/DL (ref 37–145)
IRON SATN MFR SERPL: 57 % (ref 20–50)
LDH SERPL-CCNC: 206 U/L (ref 135–214)
LYMPHOCYTES # BLD AUTO: 2.85 10*3/MM3 (ref 0.7–3.1)
LYMPHOCYTES NFR BLD AUTO: 17.1 % (ref 19.6–45.3)
MCH RBC QN AUTO: 29.6 PG (ref 26.6–33)
MCHC RBC AUTO-ENTMCNC: 34.8 G/DL (ref 31.5–35.7)
MCV RBC AUTO: 85.1 FL (ref 79–97)
MONOCYTES # BLD AUTO: 1.21 10*3/MM3 (ref 0.1–0.9)
MONOCYTES NFR BLD AUTO: 7.2 % (ref 5–12)
NEUTROPHILS NFR BLD AUTO: 12.21 10*3/MM3 (ref 1.7–7)
NEUTROPHILS NFR BLD AUTO: 73.1 % (ref 42.7–76)
NRBC BLD AUTO-RTO: 0 /100 WBC (ref 0–0.2)
PLATELET # BLD AUTO: 298 10*3/MM3 (ref 140–450)
PMV BLD AUTO: 8.7 FL (ref 6–12)
POTASSIUM SERPL-SCNC: 4.6 MMOL/L (ref 3.5–5.2)
PROT SERPL-MCNC: 6.6 G/DL (ref 6–8.5)
RBC # BLD AUTO: 5.17 10*6/MM3 (ref 3.77–5.28)
RETICS # AUTO: 0.1 10*6/MM3 (ref 0.02–0.13)
RETICS/RBC NFR AUTO: 1.88 % (ref 0.7–1.9)
SODIUM SERPL-SCNC: 144 MMOL/L (ref 136–145)
TIBC SERPL-MCNC: 350 MCG/DL (ref 298–536)
TRANSFERRIN SERPL-MCNC: 250 MG/DL (ref 200–360)
VIT B12 BLD-MCNC: 239 PG/ML (ref 211–946)
WBC NRBC COR # BLD AUTO: 16.7 10*3/MM3 (ref 3.4–10.8)

## 2023-11-16 PROCEDURE — 82607 VITAMIN B-12: CPT | Performed by: INTERNAL MEDICINE

## 2023-11-16 PROCEDURE — 86140 C-REACTIVE PROTEIN: CPT | Performed by: INTERNAL MEDICINE

## 2023-11-16 PROCEDURE — 36415 COLL VENOUS BLD VENIPUNCTURE: CPT

## 2023-11-16 PROCEDURE — 85652 RBC SED RATE AUTOMATED: CPT | Performed by: INTERNAL MEDICINE

## 2023-11-16 PROCEDURE — 83540 ASSAY OF IRON: CPT | Performed by: INTERNAL MEDICINE

## 2023-11-16 PROCEDURE — 85046 RETICYTE/HGB CONCENTRATE: CPT | Performed by: INTERNAL MEDICINE

## 2023-11-16 PROCEDURE — 83615 LACTATE (LD) (LDH) ENZYME: CPT | Performed by: INTERNAL MEDICINE

## 2023-11-16 PROCEDURE — 82728 ASSAY OF FERRITIN: CPT | Performed by: INTERNAL MEDICINE

## 2023-11-16 PROCEDURE — 84466 ASSAY OF TRANSFERRIN: CPT | Performed by: INTERNAL MEDICINE

## 2023-11-16 PROCEDURE — 82746 ASSAY OF FOLIC ACID SERUM: CPT | Performed by: INTERNAL MEDICINE

## 2023-11-16 PROCEDURE — 85025 COMPLETE CBC W/AUTO DIFF WBC: CPT

## 2023-11-16 PROCEDURE — 80053 COMPREHEN METABOLIC PANEL: CPT | Performed by: INTERNAL MEDICINE

## 2023-11-16 RX ORDER — MOXIFLOXACIN 5 MG/ML
SOLUTION/ DROPS OPHTHALMIC
COMMUNITY
Start: 2023-11-09

## 2023-11-16 NOTE — PROGRESS NOTES
Subjective         REASON FOR CONSULTATION:  LEUKOCYTOSIS AND THROMBOCYTOSIS, TO RULE OUT MYELOPROLIFERATIVE DISORDER.  Provide an opinion on any further workup or treatment                             REQUESTING PHYSICIAN:  Leon Layton MD     RECORDS OBTAINED:  Records of the patients history including those obtained from the referring provider were reviewed and summarized in detail.    HISTORY OF PRESENT ILLNESS: On 11/16/2023 this 73-year-old female has been seen in consultation. Dr. Leon Layton, has referred her because it has been noticeable in her laboratory testing on 2 different occasions lately that she has had leukocytosis and thrombocytosis in absence of any infection. The patient has had only a soft tissue infection in the right eye that has been drained through her eye doctor and medication has been applied. She has not had any fever or chills. No other infection on any level. Her appetite and weight are stable. Her bowel function and urination are normal. No cardiac or respiratory symptomatology. The patient admits that she is more fatigued now than before. She has not had any rash or new joint pain with the exception of recent episode of gout.         Past Medical History:   Diagnosis Date    Anxiety     Arthritis     Cardiomegaly     MILD    Cataract     Chronic kidney disease     Colon polyps     FOLLOWED BY DR. LUCY HILL    DDD (degenerative disc disease), lumbar     Diabetes mellitus 2021    Disease of thyroid gland     HYPOTHYROIDISM    Diverticulitis of colon     Elevated cholesterol     Excessive sleepiness     Fatty liver     GERD (gastroesophageal reflux disease)     Gout     Hard to intubate     History of cardiomegaly     History of leukocytosis     History of vitamin D deficiency     Hot flashes     HPV in female     Hyperlipidemia     Hypertension     Hypothyroidism     Insomnia     Leiomyoma     Low back pain     Lumbar radiculopathy     Lung disease     Obesity     DELIA (obstructive  sleep apnea)     Panic disorder     Plantar fasciitis     Postmenopausal HRT (hormone replacement therapy)     PROGESTERONE AND ESTRADIAL    Pulmonary nodules     RIGHT MIDDLE LOBE    Renal insufficiency     Rosacea     Steatosis of liver     Tachycardia         Past Surgical History:   Procedure Laterality Date    BARIATRIC SURGERY  2006    Removed lapband 2010    CHOLECYSTECTOMY N/A 03/12/2002    WITH CHOLANGIOGRAM, DR. LYNNETTE CHAVEZ AT [facility]    COLONOSCOPY N/A 10/04/2016    4 SESSILE TUBULAR ADENOMA POLYPS IN HEPATIC FLEXURE, 1 SESSILE TUBULAR ADENOMA AND 1 HYPERPLASTIC POLYP IN RECTUM, 6 MM SESSILE HYPERPLASTIC POLYP IN SIGMOID, 2 SESSILE HYPERPLASTI POLYPS IN RECTUM, MULTIPLE SMALL AND LARGE DIVERTICULA, RESCOPE IN 3 YRS, DR. LUCY HILL AT [facility]    COLONOSCOPY N/A 08/19/2003    ENTIRE COLON WNL, DR. ARIAS THOMASON AT [facility]    COLONOSCOPY N/A 10/07/2008    MULTIPLE LARGE MOUTHED DIVERTICULA IN SIGMOID, RESCOPE IN 5 YRS, DR. ARIAS THOMASON AT [facility]    COLONOSCOPY N/A 06/21/2011    DIVERTICULOSIS, MILD COLONIC SPASM, RESCOPE IN 5 YRS, DR. ARIAS THOMASON AT [facility]    COLONOSCOPY N/A 10/10/2019    5 MM HYPERPLASTIC POLYP IN CECUM, 5 MM HYPERPLASTIC POLYP IN ASCENDING, 5 MM HYPERPLASTIC POLYP IN TRANSVERSE, 5 MM HYPERPLASTIC POLYP IN DESCENDING, RESCOPE IN 3 YRS, DR. LUCY HILL AT [facility]    COLONOSCOPY N/A 11/29/2022    Procedure: COLONOSCOPY to cecum and TI;  with biopsies, cold bx polyps,;  Surgeon: Kathy Araya MD;  Location: [facility] ENDOSCOPY;  Service: Gastroenterology;  Laterality: N/A;  pre:  Diarrhea, left lower quadrant pain, abnormal CT scan of the sigmoid colon  post:  polyps, diverticulosis, abnormal colon mucosa, hemorrhoids    COSMETIC SURGERY N/A 1995    ABDOMINOPLASTY AND LIPOSUCTION, DR. MAUREEN WATERHOUSE    COSMETIC SURGERY Bilateral 1997    ARM REDUCTION, BRACHIOPLASTY, DR. MAUREEN WATERHOUSE    COSMETIC SURGERY Bilateral     UPPER EYELID BLEPHAROPLASTY    COSMETIC SURGERY N/A 10/13/2000    LIPECTOMY  OF ABDOMINAL WALL, DR. MAUREEN WATERHOUSE    CYSTOSCOPY N/A 03/19/2014    DR. JERMAINE MURRIETA AT [facility]    DIAGNOSTIC LAPAROSCOPY N/A 06/04/2010    DEBRIDEMENT OF ABDOMINAL WALL, DR. SILVERIO AT [facility]    DILATATION AND CURETTAGE N/A 02/2003    ENDOSCOPY N/A 01/13/2006    REFLUX ESOPHAGITIS, OTHERWISE WNL, DR. JOSE J WELLS AT [facility]    ENDOSCOPY N/A 11/29/2022    Procedure: ESOPHAGOGASTRODUODENOSCOPY iwth biopsies;  Surgeon: Kathy Araya MD;  Location: [facility] ENDOSCOPY;  Service: Gastroenterology;  Laterality: N/A;  Pre:  epigastric pain  post:  gastritis, esophagitis,     GASTRIC BANDING REMOVAL N/A 07/2011    GASTRIC PORT CHANGE N/A     DR. SILVERIO AT [facility] [place]    HERNIA REPAIR N/A 03/12/2002    VENTRAL HERNIA REPAIR, DR. KAITLYNN CHAVEZ AT [facility]    LAPAROSCOPIC GASTRIC BANDING N/A 02/21/2017    DR. SILVERIO AT [facility]    LAPAROSCOPIC LYSIS OF ADHESIONS N/A 11/05/2018    DR. LORI HILL AT [place]    SHOULDER ARTHROSCOPY W/ LABRAL REPAIR Left     AND DEBRIDEMENT OF ROTATOR CUFF    TENSION FREE VAGINAL TAPING WITH MINI ARC SLING N/A 03/19/2004    DR. JERMAINE MURRIETA AT [facility]    TUBAL ABDOMINAL LIGATION Bilateral 1982    UPPER GASTROINTESTINAL ENDOSCOPY  2022        Current Outpatient Medications on File Prior to Visit   Medication Sig Dispense Refill    acetaminophen (TYLENOL) 325 MG tablet Take 1 tablet by mouth Every 6 (Six) Hours As Needed.      allopurinol (ZYLOPRIM) 300 MG tablet Daily.      atenolol (TENORMIN) 50 MG tablet TAKE ONE TABLET BY MOUTH DAILY 30 tablet 0    atorvastatin (LIPITOR) 20 MG tablet       bismuth subsalicylate (Pepto-Bismol) 262 MG chewable tablet Chew 2 tablets 2 (Two) Times a Day. 120 tablet 3    Calcium-Vitamin D-Vitamin K 500-500-40 MG-UNT-MCG chewable tablet Chew.      Cholecalciferol (VITAMIN D3) 5000 units chewable tablet Chew Daily.      diphenoxylate-atropine (LOMOTIL) 2.5-0.025 MG per tablet Take 1 tablet by mouth.      empagliflozin (JARDIANCE) 10  "MG tablet tablet Take 1 tablet by mouth.      estradiol (MINIVELLE, VIVELLE-DOT) 0.05 MG/24HR patch 2 (two) times a week      finasteride (PROSCAR) 5 MG tablet       furosemide (LASIX) 40 MG tablet       glucose blood test strip 1 each by Other route Daily.      imipramine (TOFRANIL-PM) 75 MG capsule 1 cap(s) orally once a day (at bedtime)      ketoconazole (NIZORAL) 2 % shampoo       levothyroxine (SYNTHROID, LEVOTHROID) 100 MCG tablet       metroNIDAZOLE (METROGEL) 0.75 % gel metronidazole 0.75 % topical gel      moxifloxacin (VIGAMOX) 0.5 % ophthalmic solution       Nutritional Supplements (Silica) 12.5 MG capsule       omeprazole (priLOSEC) 40 MG capsule Take 1 capsule by mouth Daily. 90 capsule 3    Peppermint Oil (IBgard) 90 MG capsule controlled-release       Probiotic Product capsule       Progesterone (PROMETRIUM) 100 MG capsule       Semaglutide,0.25 or 0.5MG/DOS, (Ozempic, 0.25 or 0.5 MG/DOSE,) 2 MG/1.5ML solution pen-injector Inject 0.25 mg under the skin into the appropriate area as directed 1 (One) Time Per Week for 28 days, THEN 0.5 mg 1 (One) Time Per Week for 14 days. 1.5 mL 0    valsartan (DIOVAN) 160 MG tablet Take 2 tablets by mouth Daily.       No current facility-administered medications on file prior to visit.        ALLERGIES:    Allergies   Allergen Reactions    Other Dermatitis, Itching and Swelling     \"surgical glue\"    Wound Dressing Adhesive Itching    Duloxetine Other (See Comments)     Other reaction(s): Decreased libido  DECREASED LIBIDO    Erythromycin Nausea Only    Formaldehyde Itching    Minocycline Swelling     GENERALIZED    Nsaids Other (See Comments)     Avoids due to CKD     Tea Tree Oil Other (See Comments)     Found during allergy test    Venlafaxine Other (See Comments)     Other reaction(s): Decreased libido  DECREASED LIBIDO.        Social History     Socioeconomic History    Marital status:      Spouse name: Lincoln   Tobacco Use    Smoking status: Former     " Packs/day: 0.50     Years: 20.00     Additional pack years: 0.00     Total pack years: 10.00     Types: Cigarettes     Quit date: 1992     Years since quittin.8    Smokeless tobacco: Never   Substance and Sexual Activity    Alcohol use: Yes     Comment: Do not drink daily    Drug use: No    Sexual activity: Yes     Partners: Male     Birth control/protection: Post-menopausal, Tubal ligation        Family History   Problem Relation Age of Onset    Heart murmur Mother     Hyperlipidemia Mother     Heart disease Mother     Hypertension Mother     Alcohol abuse Father     Cancer Father     Liver disease Father     Cirrhosis Father     COPD Father     Hypertension Sister     Heart disease Sister     Heart disease Brother     Basal cell carcinoma Brother     Hypertension Brother     Cancer Brother     Diabetes Brother     Gout Brother     Valvular heart disease Daughter     No Known Problems Son     Cancer Maternal Grandmother     Suicidality Paternal Grandfather         Review of Systems   General: no fever, no chills, STATED fatigue,no weight changes, no lack of appetite.  Eyes: no epiphora, xerophthalmia,conjunctivitis, pain, glaucoma, blurred vision, blindness, secretion, photophobia, proptosis, diplopia.  Ears: no otorrhea, tinnitus, otorrhagia, deafness, pain, vertigo.  Nose: no rhinorrhea, no epistaxis, no alteration in perception of odors, no sinuses pressure.  Mouth: no alteration in gums or teeth,  No ulcers, no difficulty with mastication or deglution, no odynophagia.  Neck: no masses or pain, no thyroid alterations, no pain in muscles or arteries, no carotid odynia, no crepitation.  Respiratory: no cough, no sputum production,no dyspnea,no trepopnea, no pleuritic pain,no hemoptysis.  Heart: no syncope, no irregularity, no palpitations, no angina,no orthopnea,no paroxysmal nocturnal dyspnea.  Vascular Venous: no tenderness,no edema,no palpable cords,no postphlebitic syndrome, no skin changes no  "ulcerations.  Vascular Arterial: no distal ischemia, no claudication, no gangrene, no neuropathic ischemic pain, no skin ulcers, no paleness no cyanosis.  GI: no dysphagia, no odynophagia, no regurgitation, no heartburn,no indigestion,no nausea,no vomiting,no hematemesis ,no melena,no jaundice,no distention, no obstipation,no enterorrhagia,no proctalgia,no anal  lesions, no changes in bowel habits.  : no frequency, no hesitancy, no hematuria, no discharge,no  pain.  Musculoskeletal: STATED muscle or tendon pain or inflammation,no  joint pain, no edema, no functional limitation,no fasciculations, no mass.  Neurologic: no headache, no seizures, no alterations on craneal nerves, no motor deficit, no sensory deficit, normal coordination, no alteration in memory,normal orientation, calculation,normal writing, verbal and written language.  Skin: no rashes,no pruritus STATED localized lesions.  Psychiatric: no anxiety, no depression,no agitation, no delusions, proper insight.      Objective     Vitals:    11/16/23 1423   BP: 140/77   Pulse: 96   Temp: 97.3 °F (36.3 °C)   TempSrc: Temporal   SpO2: 98%   Weight: 73.7 kg (162 lb 6.4 oz)   Height: 147.3 cm (57.99\")   PainSc: 0-No pain         11/16/2023     2:20 PM   Current Status   ECOG score 0       EXAM:       GENERAL:  Well-developed, Patient  in no acute distress.   SKIN:  Warm, dry ,NO purpura ,no rash.  HEENT:  Pupils were equal and reactive to light and accomodation, conjunctivae noninjected,  normal visual acuity. Stye lower lid right eye. Recent abscess drainage upper lid right eye.      NECK:  Supple with good range of motion; no thyromegaly , no JVD or bruits,.No carotid artery pain, no carotid abnormal pulsation   LYMPHATICS:  No cervical, NO supraclavicular, NO axillary, NO inguinal adenopathies.  CARDIAC   normal rate , regular rhythm, without murmur,NO rubs NO S3 NO S4   LUNGS: normal breath sounds bilateral, no wheezing, NO rhonchi, NO crackles ,NO " rubs.  VASCULAR VENOUS: no cyanosis, NO collateral circulation, NO varicosities, NO edema, NO palpable cords, NO pain,NO erythema, NO pigmentation of the skin.  ABDOMEN:  Soft, NO pain,no hepatomegaly, no splenomegaly,no masses, no ascites, no collateral circulation,no distention.  EXTREMITIES  AND SPINE:  No clubbing, no cyanosis ,no deformities , no pain .No kyphosis,  no pain in spine, no pain in ribs , no pain in pelvic bone.  NEUROLOGICAL:  Patient was awake, alert, oriented to time, person and place.      RECENT LABS:  Hematology WBC   Date Value Ref Range Status   11/16/2023 16.70 (H) 3.40 - 10.80 10*3/mm3 Final   10/25/2023 16.43 (H) 3.40 - 10.80 10*3/mm3 Final   03/31/2023 10.25 4.5 - 11.0 10*3/uL Final     RBC   Date Value Ref Range Status   11/16/2023 5.17 3.77 - 5.28 10*6/mm3 Final   10/25/2023 5.04 3.77 - 5.28 10*6/mm3 Final   03/31/2023 4.49 4.0 - 5.2 10*6/uL Final     Hemoglobin   Date Value Ref Range Status   11/16/2023 15.3 12.0 - 15.9 g/dL Final   03/31/2023 13.6 12.0 - 16.0 g/dL Final     Hematocrit   Date Value Ref Range Status   11/16/2023 44.0 34.0 - 46.6 % Final   03/31/2023 41.8 36.0 - 46.0 % Final     Platelets   Date Value Ref Range Status   11/16/2023 298 140 - 450 10*3/mm3 Final   03/31/2023 464 (H) 140 - 440 10*3/uL Final       CBC:    WBC   Date Value Ref Range Status   11/16/2023 16.70 (H) 3.40 - 10.80 10*3/mm3 Final   10/25/2023 16.43 (H) 3.40 - 10.80 10*3/mm3 Final   03/31/2023 10.25 4.5 - 11.0 10*3/uL Final     RBC   Date Value Ref Range Status   11/16/2023 5.17 3.77 - 5.28 10*6/mm3 Final   10/25/2023 5.04 3.77 - 5.28 10*6/mm3 Final   03/31/2023 4.49 4.0 - 5.2 10*6/uL Final     Hemoglobin   Date Value Ref Range Status   11/16/2023 15.3 12.0 - 15.9 g/dL Final   03/31/2023 13.6 12.0 - 16.0 g/dL Final     Hematocrit   Date Value Ref Range Status   11/16/2023 44.0 34.0 - 46.6 % Final   03/31/2023 41.8 36.0 - 46.0 % Final     MCV   Date Value Ref Range Status   11/16/2023 85.1 79.0 -  97.0 fL Final   03/31/2023 93.1 80.0 - 100.0 fL Final     MCH   Date Value Ref Range Status   11/16/2023 29.6 26.6 - 33.0 pg Final   03/31/2023 30.3 26.0 - 34.0 pg Final     MCHC   Date Value Ref Range Status   11/16/2023 34.8 31.5 - 35.7 g/dL Final   03/31/2023 32.5 31.0 - 37.0 g/dL Final     RDW   Date Value Ref Range Status   11/16/2023 13.8 12.3 - 15.4 % Final   03/31/2023 14.4 12.0 - 16.8 % Final     RDW-SD   Date Value Ref Range Status   11/16/2023 42.9 37.0 - 54.0 fl Final     MPV   Date Value Ref Range Status   11/16/2023 8.7 6.0 - 12.0 fL Final   03/31/2023 9.5 8.4 - 12.4 fL Final     Platelets   Date Value Ref Range Status   11/16/2023 298 140 - 450 10*3/mm3 Final   03/31/2023 464 (H) 140 - 440 10*3/uL Final     Neutrophil Rel %   Date Value Ref Range Status   03/31/2023 51.7 45 - 80 % Final     Neutrophil %   Date Value Ref Range Status   11/16/2023 73.1 42.7 - 76.0 % Final     Lymphocyte Rel %   Date Value Ref Range Status   03/31/2023 33.1 15 - 50 % Final     Lymphocyte %   Date Value Ref Range Status   11/16/2023 17.1 (L) 19.6 - 45.3 % Final     Monocyte Rel %   Date Value Ref Range Status   03/31/2023 8.8 0 - 15 % Final     Monocyte %   Date Value Ref Range Status   11/16/2023 7.2 5.0 - 12.0 % Final     Eosinophil %   Date Value Ref Range Status   11/16/2023 1.0 0.3 - 6.2 % Final   03/31/2023 4.1 0 - 7 % Final     Basophil Rel %   Date Value Ref Range Status   03/31/2023 0.8 0 - 2 % Final     Basophil %   Date Value Ref Range Status   11/16/2023 0.2 0.0 - 1.5 % Final     Immature Grans %   Date Value Ref Range Status   11/16/2023 1.4 (H) 0.0 - 0.5 % Final   03/31/2023 1.5 (H) 0.0 - 1.0 % Final     Neutrophils Absolute   Date Value Ref Range Status   03/31/2023 5.31 2.0 - 8.8 10*3/uL Final     Neutrophils, Absolute   Date Value Ref Range Status   11/16/2023 12.21 (H) 1.70 - 7.00 10*3/mm3 Final     Lymphocytes Absolute   Date Value Ref Range Status   03/31/2023 3.39 0.7 - 5.5 10*3/uL Final      Lymphocytes, Absolute   Date Value Ref Range Status   11/16/2023 2.85 0.70 - 3.10 10*3/mm3 Final     Monocytes Absolute   Date Value Ref Range Status   03/31/2023 0.90 0.0 - 1.7 10*3/uL Final     Monocytes, Absolute   Date Value Ref Range Status   11/16/2023 1.21 (H) 0.10 - 0.90 10*3/mm3 Final     Eosinophils Absolute   Date Value Ref Range Status   03/31/2023 0.42 0.0 - 0.8 10*3/uL Final     Eosinophils, Absolute   Date Value Ref Range Status   11/16/2023 0.16 0.00 - 0.40 10*3/mm3 Final     Basophils Absolute   Date Value Ref Range Status   03/31/2023 0.08 0.0 - 0.2 10*3/uL Final     Basophils, Absolute   Date Value Ref Range Status   11/16/2023 0.04 0.00 - 0.20 10*3/mm3 Final     Immature Grans, Absolute   Date Value Ref Range Status   11/16/2023 0.23 (H) 0.00 - 0.05 10*3/mm3 Final   03/31/2023 0.15 (H) 0.00 - 0.10 10*3/uL Final     nRBC   Date Value Ref Range Status   11/16/2023 0.0 0.0 - 0.2 /100 WBC Final        CMP:    Glucose   Date Value Ref Range Status   10/25/2023 184 (H) 65 - 99 mg/dL Final   05/31/2022 108 (H) 65 - 99 mg/dL Final     BUN   Date Value Ref Range Status   10/25/2023 23 8 - 23 mg/dL Final   05/31/2022 16 8 - 23 mg/dL Final   04/18/2022 22 (H) 10 - 20 mg/dL Final     Creatinine   Date Value Ref Range Status   10/25/2023 1.48 (H) 0.57 - 1.00 mg/dL Final   05/31/2022 1.25 (H) 0.57 - 1.00 mg/dL Final   04/18/2022 1.20 (H) 0.55 - 1.02 mg/dL Final   01/10/2022 104.0 15 - 500 mg/dL Final     Sodium   Date Value Ref Range Status   10/25/2023 138 136 - 145 mmol/L Final   05/31/2022 136 136 - 145 mmol/L Final   04/18/2022 142 136 - 145 mmol/L Final     Potassium   Date Value Ref Range Status   10/25/2023 4.8 3.5 - 5.2 mmol/L Final   05/31/2022 3.8 3.5 - 5.2 mmol/L Final   04/18/2022 4.8 3.5 - 5.1 mmol/L Final     Chloride   Date Value Ref Range Status   10/25/2023 101 98 - 107 mmol/L Final   05/31/2022 101 98 - 107 mmol/L Final   04/18/2022 101 98 - 107 mmol/L Final     CO2   Date Value Ref Range  Status   05/31/2022 21.8 (L) 22.0 - 29.0 mmol/L Final     Total CO2   Date Value Ref Range Status   10/25/2023 24.7 22.0 - 29.0 mmol/L Final   04/18/2022 27 22 - 29 mmol/L Final     Calcium   Date Value Ref Range Status   10/25/2023 10.2 8.6 - 10.5 mg/dL Final   05/31/2022 9.1 8.6 - 10.5 mg/dL Final   04/18/2022 9.5 8.4 - 10.2 mg/dL Final     Total Protein   Date Value Ref Range Status   05/31/2022 6.5 6.0 - 8.5 g/dL Final   01/10/2022 6.8 6.2 - 8.0 g/dL Final     Albumin   Date Value Ref Range Status   10/25/2023 4.6 3.5 - 5.2 g/dL Final   05/31/2022 3.70 3.50 - 5.20 g/dL Final   01/10/2022 4.0 3.2 - 4.6 g/dL Final     ALT (SGPT)   Date Value Ref Range Status   10/25/2023 13 1 - 33 U/L Final   05/31/2022 22 1 - 33 U/L Final   01/10/2022 22 10 - 35 U/L Final     AST (SGOT)   Date Value Ref Range Status   10/25/2023 12 1 - 32 U/L Final   05/31/2022 23 1 - 32 U/L Final   01/10/2022 26 10 - 35 U/L Final     Alkaline Phosphatase   Date Value Ref Range Status   10/25/2023 181 (H) 39 - 117 U/L Final   05/31/2022 151 (H) 39 - 117 U/L Final   01/10/2022 152 (H) 40 - 150 U/L Final     Total Bilirubin   Date Value Ref Range Status   10/25/2023 0.4 0.0 - 1.2 mg/dL Final   05/31/2022 0.8 0.0 - 1.2 mg/dL Final   01/10/2022 0.5 0.2 - 1.2 mg/dL Final     eGFR  Am   Date Value Ref Range Status   12/12/2014 54 mL/min/1.732 Final     Comment:     GFR Normal                            >60  Chronic Kidney Disease          <60  Kidney Failure                         <15       Globulin   Date Value Ref Range Status   05/31/2022 2.8 gm/dL Final   01/10/2022 2.7 1.5 - 4.5 g/dL Final     A/G Ratio   Date Value Ref Range Status   05/31/2022 1.3 g/dL Final     BUN/Creatinine Ratio   Date Value Ref Range Status   10/25/2023 15.5 7.0 - 25.0 Final   05/31/2022 12.8 7.0 - 25.0 Final   04/18/2022 18.6 10.0 - 20.0 RATIO Final     Anion Gap   Date Value Ref Range Status   05/31/2022 13.2 5.0 - 15.0 mmol/L Final         Recent imaging:    CT  Abdomen Pelvis Without Contrast (06/13/2022 15:24)   Assessment & Plan     Diagnoses and all orders for this visit:    1. Thrombocytosis (Primary)  -     CBC & Differential; Future  -     Comprehensive Metabolic Panel; Future  -     Comprehensive Metabolic Panel  -     Ferritin  -     Iron Profile  -     Retic With IRF & RET-He  -     BCR-ABL1, CML / ALL, PCR, Quant  -     Folate  -     Lactate Dehydrogenase  -     Sedimentation Rate  -     Vitamin B12  -     CBC & Differential; Future    2. Bandemia  -     CBC & Differential; Future  -     Comprehensive Metabolic Panel; Future  -     Comprehensive Metabolic Panel  -     Ferritin  -     Iron Profile  -     Retic With IRF & RET-He  -     BCR-ABL1, CML / ALL, PCR, Quant  -     Folate  -     Lactate Dehydrogenase  -     Sedimentation Rate  -     Vitamin B12  -     CBC & Differential; Future        On 11/16/2023 I have reviewed this patient's peripheral blood. The following is the report of this.    1. White blood cells. There is minimal increase in the total amount of white blood cells, there is shift of maturation. Some tendency to hypersegmentation of the poles. There are no blasts or plasma cells in circulation. There is no toxic granulation.  2. Red cells. Red cells are normocytic, normochromic with no inclusions no fragments, no spherocytes. No cigar cells. No rouleaux formation.   3. Platelets. Normal platelet count and morphology. No platelet clumps.    In summary this patient has had some leukocytosis and thrombocytosis at least on 2 blood measurements. Today her thrombocytosis has disappeared, the leukocytosis has persisted. Obviously the first differential diagnosis will be that this patient has an infection somewhere. An infection that she has had in the soft tissues of the right eye will not count for this response. There is no other inflammatory or infectious process in this patient as far as I can tell on clinical grounds. This raises the question if  obesity is part of the problem and maybe is the problem.    The other phenomenon that could be happening whenever thrombocytosis and leukocytosis are altogether and now that the patient also has an increase in the hemoglobin will be a myeloproliferative disorder. For this reason we will proceed with at least a BCR/ABL in peripheral blood.     I have not modified any of the medicines that she is taking neither have provided any new medicines. She went back to the lab to proceed with laboratory testing that includes CMP, sed rate, C-reactive protein, B12, folic acid level, ferritin, iron profile, LDH and a BCR/ABL. In 4 weeks upon return she will have a repeat CBC.     I discussed all of these facts with the patient. She was grateful about the conversation and the support provided.

## 2023-11-17 ENCOUNTER — TELEPHONE (OUTPATIENT)
Dept: OTHER | Facility: HOSPITAL | Age: 73
End: 2023-11-17
Payer: MEDICARE

## 2023-12-01 LAB — REF LAB TEST METHOD: NORMAL

## 2023-12-06 DIAGNOSIS — E11.65 TYPE 2 DIABETES MELLITUS WITH HYPERGLYCEMIA, WITHOUT LONG-TERM CURRENT USE OF INSULIN: ICD-10-CM

## 2023-12-06 RX ORDER — ATORVASTATIN CALCIUM 20 MG/1
20 TABLET, FILM COATED ORAL NIGHTLY
Qty: 90 TABLET | Refills: 2 | Status: SHIPPED | OUTPATIENT
Start: 2023-12-06

## 2023-12-06 RX ORDER — SEMAGLUTIDE 0.68 MG/ML
INJECTION, SOLUTION SUBCUTANEOUS
Qty: 3 ML | OUTPATIENT
Start: 2023-12-06

## 2023-12-06 NOTE — TELEPHONE ENCOUNTER
Caller: [facility] [id number] [place], [place] - [address] AT [facility] [phone] [facility] [phone] FX    Relationship: Pharmacy    Best call back number: [phone]     Requested Prescriptions:   Requested Prescriptions     Pending Prescriptions Disp Refills    atorvastatin (LIPITOR) 20 MG tablet 90 tablet         Pharmacy where request should be sent: [facility] [id number] [place], [place] [address]  [facility] [phone] [facility] [phone] FX     Last office visit with prescribing clinician: 10/25/2023   Last telemedicine visit with prescribing clinician: Visit date not found   Next office visit with prescribing clinician: 1/25/2024     Additional details provided by patient:     Does the patient have less than a 3 day supply:  [x] Yes  [] No    Would you like a call back once the refill request has been completed: [] Yes [] No    If the office needs to give you a call back, can they leave a voicemail: [] Yes [] No    Kyler Jones Rep   12/06/23 14:42 EST

## 2023-12-12 ENCOUNTER — OFFICE VISIT (OUTPATIENT)
Dept: ONCOLOGY | Facility: CLINIC | Age: 73
End: 2023-12-12
Payer: MEDICARE

## 2023-12-12 ENCOUNTER — LAB (OUTPATIENT)
Dept: LAB | Facility: HOSPITAL | Age: 73
End: 2023-12-12
Payer: MEDICARE

## 2023-12-12 VITALS
OXYGEN SATURATION: 96 % | HEIGHT: 58 IN | DIASTOLIC BLOOD PRESSURE: 82 MMHG | TEMPERATURE: 97.5 F | BODY MASS INDEX: 34.55 KG/M2 | WEIGHT: 164.6 LBS | HEART RATE: 85 BPM | SYSTOLIC BLOOD PRESSURE: 143 MMHG

## 2023-12-12 DIAGNOSIS — D75.839 THROMBOCYTOSIS: ICD-10-CM

## 2023-12-12 DIAGNOSIS — D72.825 BANDEMIA: ICD-10-CM

## 2023-12-12 DIAGNOSIS — D72.825 BANDEMIA: Primary | ICD-10-CM

## 2023-12-12 LAB
ALBUMIN SERPL-MCNC: 3.7 G/DL (ref 3.5–5.2)
ALBUMIN/GLOB SERPL: 1.4 G/DL
ALP SERPL-CCNC: 141 U/L (ref 39–117)
ALT SERPL W P-5'-P-CCNC: 12 U/L (ref 1–33)
ANION GAP SERPL CALCULATED.3IONS-SCNC: 8.8 MMOL/L (ref 5–15)
AST SERPL-CCNC: 13 U/L (ref 1–32)
BASOPHILS # BLD AUTO: 0.07 10*3/MM3 (ref 0–0.2)
BASOPHILS NFR BLD AUTO: 0.7 % (ref 0–1.5)
BILIRUB SERPL-MCNC: 0.5 MG/DL (ref 0–1.2)
BUN SERPL-MCNC: 19 MG/DL (ref 8–23)
BUN/CREAT SERPL: 12.7 (ref 7–25)
CALCIUM SPEC-SCNC: 9 MG/DL (ref 8.6–10.5)
CHLORIDE SERPL-SCNC: 103 MMOL/L (ref 98–107)
CO2 SERPL-SCNC: 27.2 MMOL/L (ref 22–29)
CREAT SERPL-MCNC: 1.5 MG/DL (ref 0.57–1)
DEPRECATED RDW RBC AUTO: 55.4 FL (ref 37–54)
EGFRCR SERPLBLD CKD-EPI 2021: 37.1 ML/MIN/1.73
EOSINOPHIL # BLD AUTO: 0.12 10*3/MM3 (ref 0–0.4)
EOSINOPHIL NFR BLD AUTO: 1.2 % (ref 0.3–6.2)
ERYTHROCYTE [DISTWIDTH] IN BLOOD BY AUTOMATED COUNT: 17 % (ref 12.3–15.4)
GLOBULIN UR ELPH-MCNC: 2.6 GM/DL
GLUCOSE SERPL-MCNC: 142 MG/DL (ref 65–99)
HCT VFR BLD AUTO: 39.3 % (ref 34–46.6)
HGB BLD-MCNC: 13.5 G/DL (ref 12–15.9)
IMM GRANULOCYTES # BLD AUTO: 0.33 10*3/MM3 (ref 0–0.05)
IMM GRANULOCYTES NFR BLD AUTO: 3.4 % (ref 0–0.5)
LYMPHOCYTES # BLD AUTO: 3.39 10*3/MM3 (ref 0.7–3.1)
LYMPHOCYTES NFR BLD AUTO: 34.7 % (ref 19.6–45.3)
MCH RBC QN AUTO: 31 PG (ref 26.6–33)
MCHC RBC AUTO-ENTMCNC: 34.4 G/DL (ref 31.5–35.7)
MCV RBC AUTO: 90.1 FL (ref 79–97)
MONOCYTES # BLD AUTO: 0.76 10*3/MM3 (ref 0.1–0.9)
MONOCYTES NFR BLD AUTO: 7.8 % (ref 5–12)
NEUTROPHILS NFR BLD AUTO: 5.1 10*3/MM3 (ref 1.7–7)
NEUTROPHILS NFR BLD AUTO: 52.2 % (ref 42.7–76)
NRBC BLD AUTO-RTO: 0 /100 WBC (ref 0–0.2)
PLATELET # BLD AUTO: 306 10*3/MM3 (ref 140–450)
PMV BLD AUTO: 8.2 FL (ref 6–12)
POTASSIUM SERPL-SCNC: 3.9 MMOL/L (ref 3.5–5.2)
PROT SERPL-MCNC: 6.3 G/DL (ref 6–8.5)
RBC # BLD AUTO: 4.36 10*6/MM3 (ref 3.77–5.28)
SODIUM SERPL-SCNC: 139 MMOL/L (ref 136–145)
WBC NRBC COR # BLD AUTO: 9.77 10*3/MM3 (ref 3.4–10.8)

## 2023-12-12 PROCEDURE — 36415 COLL VENOUS BLD VENIPUNCTURE: CPT

## 2023-12-12 PROCEDURE — 85025 COMPLETE CBC W/AUTO DIFF WBC: CPT

## 2023-12-12 PROCEDURE — 80053 COMPREHEN METABOLIC PANEL: CPT

## 2023-12-12 RX ORDER — ESTRADIOL 0.07 MG/D
PATCH TRANSDERMAL
COMMUNITY
Start: 2023-11-09

## 2023-12-12 NOTE — PROGRESS NOTES
Subjective         REASON FOR FOLLOW UP:  LEUKOCYTOSIS AND THROMBOCYTOSIS, TO RULE OUT MYELOPROLIFERATIVE DISORDER.    HISTORY OF PRESENT ILLNESS: On 11/16/2023 this 73-year-old female has been seen in consultation. Dr. Leon Layton, has referred her because it has been noticeable in her laboratory testing on 2 different occasions lately that she has had leukocytosis and thrombocytosis in absence of any infection. The patient has had only a soft tissue infection in the right eye that has been drained through her eye doctor and medication has been applied. She has not had any fever or chills. No other infection on any level. Her appetite and weight are stable. Her bowel function and urination are normal. No cardiac or respiratory symptomatology. The patient admits that she is more fatigued now than before. She has not had any rash or new joint pain with the exception of recent episode of gout.     On 12/12/2023 the patient returns to the office for follow up. In the interim laboratory assessment has been done to evaluate her leukocytosis and for review today. The patient feels well at this time. She has proper appetite, proper bowel activity, no difficulty with urine volume. If she does not take the Lasix she has volume accumulation and significant swelling. Otherwise no other new problems. She has not fever, no adenopathy, no rashes.         Past Medical History:   Diagnosis Date    Anxiety     Arthritis     Cardiomegaly     MILD    Cataract     Chronic kidney disease     Colon polyps     FOLLOWED BY DR. LUCY HILL    DDD (degenerative disc disease), lumbar     Diabetes mellitus 2021    Disease of thyroid gland     HYPOTHYROIDISM    Diverticulitis of colon     Elevated cholesterol     Excessive sleepiness     Fatty liver     GERD (gastroesophageal reflux disease)     Gout     Hard to intubate     History of cardiomegaly     History of leukocytosis     History of vitamin D deficiency     Hot flashes     HPV in  female     Hyperlipidemia     Hypertension     Hypothyroidism     Insomnia     Leiomyoma     Low back pain     Lumbar radiculopathy     Lung disease     Obesity     DELIA (obstructive sleep apnea)     Panic disorder     Plantar fasciitis     Postmenopausal HRT (hormone replacement therapy)     PROGESTERONE AND ESTRADIAL    Pulmonary nodules     RIGHT MIDDLE LOBE    Renal insufficiency     Rosacea     Steatosis of liver     Tachycardia         Past Surgical History:   Procedure Laterality Date    BARIATRIC SURGERY  2006    Removed lapband 2010    CHOLECYSTECTOMY N/A 03/12/2002    WITH CHOLANGIOGRAM, DR. LYNNETTE CHAVEZ AT [facility]    COLONOSCOPY N/A 10/04/2016    4 SESSILE TUBULAR ADENOMA POLYPS IN HEPATIC FLEXURE, 1 SESSILE TUBULAR ADENOMA AND 1 HYPERPLASTIC POLYP IN RECTUM, 6 MM SESSILE HYPERPLASTIC POLYP IN SIGMOID, 2 SESSILE HYPERPLASTI POLYPS IN RECTUM, MULTIPLE SMALL AND LARGE DIVERTICULA, RESCOPE IN 3 YRS, DR. LUCY HILL AT [facility]    COLONOSCOPY N/A 08/19/2003    ENTIRE COLON WNL, DR. ARIAS THOMASON AT [facility]    COLONOSCOPY N/A 10/07/2008    MULTIPLE LARGE MOUTHED DIVERTICULA IN SIGMOID, RESCOPE IN 5 YRS, DR. ARIAS THOMASON AT [facility]    COLONOSCOPY N/A 06/21/2011    DIVERTICULOSIS, MILD COLONIC SPASM, RESCOPE IN 5 YRS, DR. ARIAS THOMASON AT [facility]    COLONOSCOPY N/A 10/10/2019    5 MM HYPERPLASTIC POLYP IN CECUM, 5 MM HYPERPLASTIC POLYP IN ASCENDING, 5 MM HYPERPLASTIC POLYP IN TRANSVERSE, 5 MM HYPERPLASTIC POLYP IN DESCENDING, RESCOPE IN 3 YRS, DR. LUCY HILL AT [facility]    COLONOSCOPY N/A 11/29/2022    Procedure: COLONOSCOPY to cecum and TI;  with biopsies, cold bx polyps,;  Surgeon: Kathy Araya MD;  Location: [facility] ENDOSCOPY;  Service: Gastroenterology;  Laterality: N/A;  pre:  Diarrhea, left lower quadrant pain, abnormal CT scan of the sigmoid colon  post:  polyps, diverticulosis, abnormal colon mucosa, hemorrhoids    COSMETIC SURGERY N/A 1995    ABDOMINOPLASTY AND LIPOSUCTION, DR. MAUREEN WATERHOUSE    COSMETIC  SURGERY Bilateral 1997    ARM REDUCTION, BRACHIOPLASTY, DR. MAUREEN WATERHOUSE    COSMETIC SURGERY Bilateral     UPPER EYELID BLEPHAROPLASTY    COSMETIC SURGERY N/A 10/13/2000    LIPECTOMY OF ABDOMINAL WALL, DR. MAUREEN WATERHOUSE    CYSTOSCOPY N/A 03/19/2014    DR. JERMAINE MURRIETA AT [facility]    DIAGNOSTIC LAPAROSCOPY N/A 06/04/2010    DEBRIDEMENT OF ABDOMINAL WALL, DR. SILVERIO AT [facility]    DILATATION AND CURETTAGE N/A 02/2003    ENDOSCOPY N/A 01/13/2006    REFLUX ESOPHAGITIS, OTHERWISE WNL, DR. JOSE J WELLS AT [facility]    ENDOSCOPY N/A 11/29/2022    Procedure: ESOPHAGOGASTRODUODENOSCOPY iwth biopsies;  Surgeon: Kathy Araya MD;  Location: [facility] ENDOSCOPY;  Service: Gastroenterology;  Laterality: N/A;  Pre:  epigastric pain  post:  gastritis, esophagitis,     GASTRIC BANDING REMOVAL N/A 07/2011    GASTRIC PORT CHANGE N/A     DR. SILVERIO AT [facility]    HERNIA REPAIR N/A 03/12/2002    VENTRAL HERNIA REPAIR, DR. KAITLYNN CHAVEZ AT [facility]    LAPAROSCOPIC GASTRIC BANDING N/A 02/21/2017    DR. SILVERIO AT [facility]    LAPAROSCOPIC LYSIS OF ADHESIONS N/A 11/05/2018    DR. LORI HILL AT [place]    SHOULDER ARTHROSCOPY W/ LABRAL REPAIR Left     AND DEBRIDEMENT OF ROTATOR CUFF    TENSION FREE VAGINAL TAPING WITH MINI ARC SLING N/A 03/19/2004    DR. JERMAINE MURRIETA AT [facility]    TUBAL ABDOMINAL LIGATION Bilateral 1982    UPPER GASTROINTESTINAL ENDOSCOPY  2022        Current Outpatient Medications on File Prior to Visit   Medication Sig Dispense Refill    acetaminophen (TYLENOL) 325 MG tablet Take 1 tablet by mouth Every 6 (Six) Hours As Needed.      allopurinol (ZYLOPRIM) 300 MG tablet Daily.      atenolol (TENORMIN) 50 MG tablet TAKE ONE TABLET BY MOUTH DAILY 30 tablet 0    atorvastatin (LIPITOR) 20 MG tablet Take 1 tablet by mouth Every Night. 90 tablet 2    bismuth subsalicylate (Pepto-Bismol) 262 MG chewable tablet Chew 2 tablets 2 (Two) Times a Day. 120 tablet 3    Calcium-Vitamin D-Vitamin K  "500-500-40 MG-UNT-MCG chewable tablet Chew.      Cholecalciferol (VITAMIN D3) 5000 units chewable tablet Chew Daily.      diphenoxylate-atropine (LOMOTIL) 2.5-0.025 MG per tablet Take 1 tablet by mouth.      empagliflozin (JARDIANCE) 10 MG tablet tablet Take 1 tablet by mouth.      estradiol (CLIMARA) 0.075 MG/24HR patch       finasteride (PROSCAR) 5 MG tablet       furosemide (LASIX) 40 MG tablet       glucose blood test strip 1 each by Other route Daily.      imipramine (TOFRANIL-PM) 75 MG capsule 1 cap(s) orally once a day (at bedtime)      ketoconazole (NIZORAL) 2 % shampoo       levothyroxine (SYNTHROID, LEVOTHROID) 100 MCG tablet       metroNIDAZOLE (METROGEL) 0.75 % gel metronidazole 0.75 % topical gel      moxifloxacin (VIGAMOX) 0.5 % ophthalmic solution       Nutritional Supplements (Silica) 12.5 MG capsule       omeprazole (priLOSEC) 40 MG capsule Take 1 capsule by mouth Daily. 90 capsule 3    Peppermint Oil (IBgard) 90 MG capsule controlled-release       Probiotic Product capsule       Progesterone (PROMETRIUM) 100 MG capsule       Semaglutide,0.25 or 0.5MG/DOS, (OZEMPIC) 2 MG/3ML solution pen-injector Inject 0.5 mg under the skin into the appropriate area as directed 1 (One) Time Per Week for 28 days. 3 mL 0    valsartan (DIOVAN) 160 MG tablet Take 2 tablets by mouth Daily.      [DISCONTINUED] estradiol (MINIVELLE, VIVELLE-DOT) 0.05 MG/24HR patch 2 (two) times a week       No current facility-administered medications on file prior to visit.        ALLERGIES:    Allergies   Allergen Reactions    Other Dermatitis, Itching and Swelling     \"surgical glue\"    Wound Dressing Adhesive Itching    Duloxetine Other (See Comments)     Other reaction(s): Decreased libido  DECREASED LIBIDO    Erythromycin Nausea Only    Formaldehyde Itching    Minocycline Swelling     GENERALIZED    Nsaids Other (See Comments)     Avoids due to CKD     Tea Tree Oil Other (See Comments)     Found during allergy test    Venlafaxine " "Other (See Comments)     Other reaction(s): Decreased libido  DECREASED LIBIDO.        Social History     Socioeconomic History    Marital status:      Spouse name: Lincoln    Number of children: 2   Tobacco Use    Smoking status: Former     Packs/day: 0.50     Years: 20.00     Additional pack years: 0.00     Total pack years: 10.00     Types: Cigarettes     Quit date: 1992     Years since quittin.9    Smokeless tobacco: Never   Substance and Sexual Activity    Alcohol use: Yes     Comment: Do not drink daily    Drug use: No    Sexual activity: Yes     Partners: Male     Birth control/protection: Post-menopausal, Tubal ligation        Family History   Problem Relation Age of Onset    Heart murmur Mother     Hyperlipidemia Mother     Heart disease Mother     Hypertension Mother     Alcohol abuse Father     Liver disease Father     Cirrhosis Father     COPD Father     Liver cancer Father     Hypertension Sister     Heart disease Sister     Heart disease Brother     Basal cell carcinoma Brother     Hypertension Brother     Diabetes Brother     Gout Brother     Prostate cancer Brother     Valvular heart disease Daughter     No Known Problems Son     Cancer Maternal Grandmother     Suicidality Paternal Grandfather           Objective     Vitals:    23 1046   BP: 143/82   Pulse: 85   Temp: 97.5 °F (36.4 °C)   TempSrc: Temporal   SpO2: 96%   Weight: 74.7 kg (164 lb 9.6 oz)   Height: 147.3 cm (57.99\")   PainSc: 0-No pain         2023    10:44 AM   Current Status   ECOG score 0       EXAM:             GENERAL:  Well-developed, Patient  in no acute distress.   SKIN:  Warm, dry ,NO purpura ,no rash.  HEENT:  Pupils were equal and reactive to light and accomodation, conjunctivae noninjected,  normal visual acuity.   NECK:  Supple with good range of motion; no thyromegaly , no JVD or bruits,.No carotid artery pain, no carotid abnormal pulsation   LYMPHATICS:  No cervical, NO supraclavicular, NO " axillary, NO inguinal adenopathies.  CARDIAC   normal rate , regular rhythm, without murmur,NO rubs NO S3 NO S4   LUNGS: normal breath sounds bilateral, no wheezing, NO rhonchi, NO crackles ,NO rubs.  VASCULAR VENOUS: no cyanosis, NO collateral circulation, NO varicosities, NO edema, NO palpable cords, NO pain,NO erythema, NO pigmentation of the skin.  ABDOMEN:  Soft, NO pain,no hepatomegaly, no splenomegaly,no masses, no ascites, no collateral circulation,no distention.  EXTREMITIES  AND SPINE:  No clubbing, no cyanosis ,no deformities , no pain .No kyphosis,  no pain in spine, no pain in ribs , no pain in pelvic bone.  NEUROLOGICAL:  Patient was awake, alert, oriented to time, person and place.    Hematology WBC   Date Value Ref Range Status   12/12/2023 9.77 3.40 - 10.80 10*3/mm3 Final   10/25/2023 16.43 (H) 3.40 - 10.80 10*3/mm3 Final   03/31/2023 10.25 4.5 - 11.0 10*3/uL Final     RBC   Date Value Ref Range Status   12/12/2023 4.36 3.77 - 5.28 10*6/mm3 Final   10/25/2023 5.04 3.77 - 5.28 10*6/mm3 Final   03/31/2023 4.49 4.0 - 5.2 10*6/uL Final     Hemoglobin   Date Value Ref Range Status   12/12/2023 13.5 12.0 - 15.9 g/dL Final   03/31/2023 13.6 12.0 - 16.0 g/dL Final     Hematocrit   Date Value Ref Range Status   12/12/2023 39.3 34.0 - 46.6 % Final   03/31/2023 41.8 36.0 - 46.0 % Final     Platelets   Date Value Ref Range Status   12/12/2023 306 140 - 450 10*3/mm3 Final   03/31/2023 464 (H) 140 - 440 10*3/uL Final       CBC:    WBC   Date Value Ref Range Status   12/12/2023 9.77 3.40 - 10.80 10*3/mm3 Final   10/25/2023 16.43 (H) 3.40 - 10.80 10*3/mm3 Final   03/31/2023 10.25 4.5 - 11.0 10*3/uL Final     RBC   Date Value Ref Range Status   12/12/2023 4.36 3.77 - 5.28 10*6/mm3 Final   10/25/2023 5.04 3.77 - 5.28 10*6/mm3 Final   03/31/2023 4.49 4.0 - 5.2 10*6/uL Final     Hemoglobin   Date Value Ref Range Status   12/12/2023 13.5 12.0 - 15.9 g/dL Final   03/31/2023 13.6 12.0 - 16.0 g/dL Final     Hematocrit    Date Value Ref Range Status   12/12/2023 39.3 34.0 - 46.6 % Final   03/31/2023 41.8 36.0 - 46.0 % Final     MCV   Date Value Ref Range Status   12/12/2023 90.1 79.0 - 97.0 fL Final   03/31/2023 93.1 80.0 - 100.0 fL Final     MCH   Date Value Ref Range Status   12/12/2023 31.0 26.6 - 33.0 pg Final   03/31/2023 30.3 26.0 - 34.0 pg Final     MCHC   Date Value Ref Range Status   12/12/2023 34.4 31.5 - 35.7 g/dL Final   03/31/2023 32.5 31.0 - 37.0 g/dL Final     RDW   Date Value Ref Range Status   12/12/2023 17.0 (H) 12.3 - 15.4 % Final   03/31/2023 14.4 12.0 - 16.8 % Final     RDW-SD   Date Value Ref Range Status   12/12/2023 55.4 (H) 37.0 - 54.0 fl Final     MPV   Date Value Ref Range Status   12/12/2023 8.2 6.0 - 12.0 fL Final   03/31/2023 9.5 8.4 - 12.4 fL Final     Platelets   Date Value Ref Range Status   12/12/2023 306 140 - 450 10*3/mm3 Final   03/31/2023 464 (H) 140 - 440 10*3/uL Final     Neutrophil Rel %   Date Value Ref Range Status   03/31/2023 51.7 45 - 80 % Final     Neutrophil %   Date Value Ref Range Status   12/12/2023 52.2 42.7 - 76.0 % Final     Lymphocyte Rel %   Date Value Ref Range Status   03/31/2023 33.1 15 - 50 % Final     Lymphocyte %   Date Value Ref Range Status   12/12/2023 34.7 19.6 - 45.3 % Final     Monocyte Rel %   Date Value Ref Range Status   03/31/2023 8.8 0 - 15 % Final     Monocyte %   Date Value Ref Range Status   12/12/2023 7.8 5.0 - 12.0 % Final     Eosinophil %   Date Value Ref Range Status   12/12/2023 1.2 0.3 - 6.2 % Final   03/31/2023 4.1 0 - 7 % Final     Basophil Rel %   Date Value Ref Range Status   03/31/2023 0.8 0 - 2 % Final     Basophil %   Date Value Ref Range Status   12/12/2023 0.7 0.0 - 1.5 % Final     Immature Grans %   Date Value Ref Range Status   12/12/2023 3.4 (H) 0.0 - 0.5 % Final   03/31/2023 1.5 (H) 0.0 - 1.0 % Final     Neutrophils Absolute   Date Value Ref Range Status   03/31/2023 5.31 2.0 - 8.8 10*3/uL Final     Neutrophils, Absolute   Date Value Ref  Range Status   12/12/2023 5.10 1.70 - 7.00 10*3/mm3 Final     Lymphocytes Absolute   Date Value Ref Range Status   03/31/2023 3.39 0.7 - 5.5 10*3/uL Final     Lymphocytes, Absolute   Date Value Ref Range Status   12/12/2023 3.39 (H) 0.70 - 3.10 10*3/mm3 Final     Monocytes Absolute   Date Value Ref Range Status   03/31/2023 0.90 0.0 - 1.7 10*3/uL Final     Monocytes, Absolute   Date Value Ref Range Status   12/12/2023 0.76 0.10 - 0.90 10*3/mm3 Final     Eosinophils Absolute   Date Value Ref Range Status   03/31/2023 0.42 0.0 - 0.8 10*3/uL Final     Eosinophils, Absolute   Date Value Ref Range Status   12/12/2023 0.12 0.00 - 0.40 10*3/mm3 Final     Basophils Absolute   Date Value Ref Range Status   03/31/2023 0.08 0.0 - 0.2 10*3/uL Final     Basophils, Absolute   Date Value Ref Range Status   12/12/2023 0.07 0.00 - 0.20 10*3/mm3 Final     Immature Grans, Absolute   Date Value Ref Range Status   12/12/2023 0.33 (H) 0.00 - 0.05 10*3/mm3 Final   03/31/2023 0.15 (H) 0.00 - 0.10 10*3/uL Final     nRBC   Date Value Ref Range Status   12/12/2023 0.0 0.0 - 0.2 /100 WBC Final        CMP:    Glucose   Date Value Ref Range Status   12/12/2023 142 (H) 65 - 99 mg/dL Final     BUN   Date Value Ref Range Status   12/12/2023 19 8 - 23 mg/dL Final   11/10/2022 40 (H) 10 - 20 mg/dL Final     Creatinine   Date Value Ref Range Status   12/12/2023 1.50 (H) 0.57 - 1.00 mg/dL Final   11/10/2022 2.11 (H) 0.55 - 1.02 mg/dL Final   01/10/2022 104.0 15 - 500 mg/dL Final     Sodium   Date Value Ref Range Status   12/12/2023 139 136 - 145 mmol/L Final   11/10/2022 138 136 - 145 mmol/L Final     Potassium   Date Value Ref Range Status   12/12/2023 3.9 3.5 - 5.2 mmol/L Final   11/10/2022 5.4 (H) 3.5 - 5.1 mmol/L Final     Chloride   Date Value Ref Range Status   12/12/2023 103 98 - 107 mmol/L Final   11/10/2022 108 (H) 98 - 107 mmol/L Final     CO2   Date Value Ref Range Status   12/12/2023 27.2 22.0 - 29.0 mmol/L Final     Total CO2   Date Value  Ref Range Status   11/10/2022 18 (L) 22 - 29 mmol/L Final   04/18/2022 27 22 - 29 mmol/L Final     Calcium   Date Value Ref Range Status   12/12/2023 9.0 8.6 - 10.5 mg/dL Final   11/10/2022 9.8 8.4 - 10.2 mg/dL Final     Total Protein   Date Value Ref Range Status   12/12/2023 6.3 6.0 - 8.5 g/dL Final   09/09/2022 6.8 6.2 - 8.0 g/dL Final     Albumin   Date Value Ref Range Status   12/12/2023 3.7 3.5 - 5.2 g/dL Final   09/09/2022 4.0 3.2 - 4.6 g/dL Final     ALT (SGPT)   Date Value Ref Range Status   12/12/2023 12 1 - 33 U/L Final   09/09/2022 6 (L) 10 - 35 U/L Final     AST (SGOT)   Date Value Ref Range Status   12/12/2023 13 1 - 32 U/L Final   09/09/2022 20 10 - 35 U/L Final     Alkaline Phosphatase   Date Value Ref Range Status   12/12/2023 141 (H) 39 - 117 U/L Final   09/09/2022 136 40 - 150 U/L Final     Total Bilirubin   Date Value Ref Range Status   12/12/2023 0.5 0.0 - 1.2 mg/dL Final   09/09/2022 0.3 0.2 - 1.2 mg/dL Final     eGFR  Am   Date Value Ref Range Status   11/10/2022 28 (L) >60 /1.73 m2 Final     Comment:     (note)  Results do not take into account body mass.  Valid for patients 18  to 70 years of age.     Globulin   Date Value Ref Range Status   12/12/2023 2.6 gm/dL Final   09/09/2022 2.8 1.5 - 4.5 g/dL Final     A/G Ratio   Date Value Ref Range Status   12/12/2023 1.4 g/dL Final     BUN/Creatinine Ratio   Date Value Ref Range Status   12/12/2023 12.7 7.0 - 25.0 Final   11/10/2022 18.9 RATIO Final     Anion Gap   Date Value Ref Range Status   12/12/2023 8.8 5.0 - 15.0 mmol/L Final   11/10/2022 12 5 - 13 (arb'U) Final     Comment:     (note)  Calculation- Na - (Cl + CO2)         Recent imaging:    CT Abdomen Pelvis Without Contrast (06/13/2022 15:24)       BCR-ABL1, CML / ALL, PCR, Quant (11/16/2023 15:10)  Folate (11/16/2023 15:10)  Lactate Dehydrogenase (11/16/2023 15:10)  Sedimentation Rate (11/16/2023 15:10)  Vitamin B12 (11/16/2023 15:10)  C-reactive Protein (11/16/2023  15:10)          Assessment & Plan     Diagnoses and all orders for this visit:    1. Bandemia (Primary)          On 11/16/2023 I have reviewed this patient's peripheral blood. The following is the report of this.    1. White blood cells. There is minimal increase in the total amount of white blood cells, there is shift of maturation. Some tendency to hypersegmentation of the poles. There are no blasts or plasma cells in circulation. There is no toxic granulation.  2. Red cells. Red cells are normocytic, normochromic with no inclusions no fragments, no spherocytes. No cigar cells. No rouleaux formation.   3. Platelets. Normal platelet count and morphology. No platelet clumps.    In summary this patient has had some leukocytosis and thrombocytosis at least on 2 blood measurements. Today her thrombocytosis has disappeared, the leukocytosis has persisted. Obviously the first differential diagnosis will be that this patient has an infection somewhere. An infection that she has had in the soft tissues of the right eye will not count for this response. There is no other inflammatory or infectious process in this patient as far as I can tell on clinical grounds. This raises the question if obesity is part of the problem and maybe is the problem.    The other phenomenon that could be happening whenever thrombocytosis and leukocytosis are altogether and now that the patient also has an increase in the hemoglobin will be a myeloproliferative disorder. For this reason we will proceed with at least a BCR/ABL in peripheral blood.     I have not modified any of the medicines that she is taking neither have provided any new medicines. She went back to the lab to proceed with laboratory testing that includes CMP, sed rate, C-reactive protein, B12, folic acid level, ferritin, iron profile, LDH and a BCR/ABL. In 4 weeks upon return she will have a repeat CBC.     I discussed all of these facts with the patient. She was grateful about  the conversation and the support provided.     On 12/12/2023 the patient was further reviewed. Since the previous visit consultation the patient has had laboratory testing that included a normal sedimentation rate, normal C-reactive protein, normal LDH in order to evaluate her leukocytosis. She has not had any fever or infectious process. Also we proceeded with BCR/ABL in peripheral blood by PCR. This analysis was negative to diagnose chronic myeloid leukemia.     The only number that called my attention was her creatinine elevation of 1.8. she is known to have chronic medical kidney disease with thin kidney cortexes bilaterally and a calculus in the left kidney, staghorn with no infection and no hydronephrosis. The right kidney is bigger than the left kidney in radiological assessment posted above.     From my point of view her white count today is normal, hemoglobin is normal, the platelet count is normal and I have nothing else to report in regard to the leukocytosis in this patient. Therefore I will not need to give her any medication, modify any medicine that she is taking with the exception for her to discuss with her nephrologist the use or not of Lasix under the present circumstances.     Besides this I have not offered the patient any other intervention because I do not believe from my field there is anything necessary. I answered the question of the leukocytosis, I do not know what happened with this in this regard but at least she has no inflammatory marker alteration to make any other definitive diagnosis and she has nothing to suggest chronic myeloid leukemia.     I will not need to bring her back for review in the future. A letter will be sent to her primary nephrologist in this regard. Discussed with the patient in detail.

## 2023-12-20 RX ORDER — IMIPRAMINE PAMOATE 75 MG
75 CAPSULE ORAL NIGHTLY
Qty: 90 CAPSULE | Refills: 2 | Status: SHIPPED | OUTPATIENT
Start: 2023-12-20

## 2023-12-20 NOTE — TELEPHONE ENCOUNTER
Caller: [facility] [id number] [place], [place] - [address] AT [place] [phone] [place] [phone] [place]    Relationship: Pharmacy    Best call back number:     Requested Prescriptions:   Requested Prescriptions     Pending Prescriptions Disp Refills    imipramine (TOFRANIL-PM) 75 MG capsule          Pharmacy where request should be sent: [facility] [id number] [place], [place] [address] [place] [phone] [place] [phone] [place]     Last office visit with prescribing clinician: 10/25/2023   Last telemedicine visit with prescribing clinician: Visit date not found   Next office visit with prescribing clinician: 1/25/2024     Additional details provided by patient: PATIENT IS OUT OF MEDICATION    Does the patient have less than a 3 day supply:  [] Yes  [] No    Would you like a call back once the refill request has been completed: [] Yes [] No    If the office needs to give you a call back, can they leave a voicemail: [] Yes [] No    Kyler Ibrahim Rep   12/20/23 09:15 EST

## 2024-01-02 RX ORDER — VALSARTAN 160 MG/1
320 TABLET ORAL DAILY
Qty: 90 TABLET | Refills: 2 | Status: SHIPPED | OUTPATIENT
Start: 2024-01-02

## 2024-01-02 RX ORDER — SEMAGLUTIDE 1.34 MG/ML
1 INJECTION, SOLUTION SUBCUTANEOUS WEEKLY
Qty: 3 ML | Refills: 0 | Status: SHIPPED | OUTPATIENT
Start: 2024-01-02

## 2024-01-02 RX ORDER — SEMAGLUTIDE 0.68 MG/ML
INJECTION, SOLUTION SUBCUTANEOUS
Qty: 3 ML | Refills: 0 | Status: SHIPPED | OUTPATIENT
Start: 2024-01-02

## 2024-01-02 RX ORDER — SEMAGLUTIDE 1.34 MG/ML
1 INJECTION, SOLUTION SUBCUTANEOUS WEEKLY
Qty: 3 ML | Refills: 0 | Status: SHIPPED | OUTPATIENT
Start: 2024-01-02 | End: 2024-01-02 | Stop reason: SDUPTHER

## 2024-01-25 ENCOUNTER — OFFICE VISIT (OUTPATIENT)
Dept: INTERNAL MEDICINE | Facility: CLINIC | Age: 74
End: 2024-01-25
Payer: MEDICARE

## 2024-01-25 VITALS
HEART RATE: 90 BPM | BODY MASS INDEX: 35.48 KG/M2 | WEIGHT: 169 LBS | DIASTOLIC BLOOD PRESSURE: 64 MMHG | OXYGEN SATURATION: 97 % | HEIGHT: 58 IN | SYSTOLIC BLOOD PRESSURE: 114 MMHG

## 2024-01-25 DIAGNOSIS — E07.9 THYROID EYE DISEASE: ICD-10-CM

## 2024-01-25 DIAGNOSIS — E66.01 MORBIDLY OBESE: ICD-10-CM

## 2024-01-25 DIAGNOSIS — E78.5 HYPERLIPIDEMIA, UNSPECIFIED HYPERLIPIDEMIA TYPE: ICD-10-CM

## 2024-01-25 DIAGNOSIS — E11.22 TYPE 2 DIABETES MELLITUS WITH STAGE 4 CHRONIC KIDNEY DISEASE, WITHOUT LONG-TERM CURRENT USE OF INSULIN: Primary | Chronic | ICD-10-CM

## 2024-01-25 DIAGNOSIS — I10 PRIMARY HYPERTENSION: ICD-10-CM

## 2024-01-25 DIAGNOSIS — H57.89 THYROID EYE DISEASE: ICD-10-CM

## 2024-01-25 DIAGNOSIS — E03.9 HYPOTHYROIDISM, UNSPECIFIED TYPE: ICD-10-CM

## 2024-01-25 DIAGNOSIS — N18.4 TYPE 2 DIABETES MELLITUS WITH STAGE 4 CHRONIC KIDNEY DISEASE, WITHOUT LONG-TERM CURRENT USE OF INSULIN: Primary | Chronic | ICD-10-CM

## 2024-01-25 LAB
CHOLEST SERPL-MCNC: 141 MG/DL (ref 0–200)
HBA1C MFR BLD: 5.5 % (ref 4.8–5.6)
HDLC SERPL-MCNC: 32 MG/DL (ref 40–60)
LDLC SERPL CALC-MCNC: 60 MG/DL (ref 0–100)
LDLC/HDLC SERPL: 1.44 {RATIO}
T4 FREE SERPL-MCNC: 1.02 NG/DL (ref 0.93–1.7)
TRIGL SERPL-MCNC: 314 MG/DL (ref 0–150)
TSH SERPL DL<=0.005 MIU/L-ACNC: 8.08 UIU/ML (ref 0.27–4.2)
VLDLC SERPL CALC-MCNC: 49 MG/DL (ref 5–40)

## 2024-01-25 RX ORDER — LEVOTHYROXINE SODIUM 137 UG/1
137 TABLET ORAL DAILY
Qty: 90 TABLET | Refills: 2 | Status: SHIPPED | OUTPATIENT
Start: 2024-01-25

## 2024-01-25 NOTE — PROGRESS NOTES
"Answers submitted by the patient for this visit:  Other (Submitted on 1/18/2024)  Please describe your symptoms.: Follow up appt  Have you had these symptoms before?: No  How long have you been having these symptoms?: Greater than 2 weeks  Please list any medications you are currently taking for this condition.: Na  Please describe any probable cause for these symptoms. : Na  Primary Reason for Visit (Submitted on 1/18/2024)  What is the primary reason for your visit?: Other    Leon Layton M.D.  Internal Medicine  [address], [address]  [place] [address]  [phone]      Chief Complaint  Follow-up (3 mth F/U /)    SUBJECTIVE    History of Present Illness    Sravanthi Burns is a 73 y.o. female with CKD, hypothyroidism, GERD, hyperlipidemia, hypertension who presents to the office today as an established patient that last saw me on 10/25/2023.     Patient saw Dr. Haley for bandemia.  Blood work was repeated and was normal.  Obesity was thought to be contributing to issue.      Stage III CKD-follows with [facility]. Started on Jardiance.     Goes to hormone clinic. On progesterone, estrogen patched and T pellets.     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 [facility] levothyroxine but now they are unable to refill.     Type 2 diabetes - Today she states home BG is 120s fasting as high as 130. Lowest  in last month. Highest 139.  Denies polyuria.  Does not have a log. BG was 129 fasting. States Ozempic works well. No nausea/constipation. Now on Jardiance per nephrology. States she is \"never hungry\". Watches carbs.      History of Gout     Hypertension-on valsartan and atenolol. Blood pressure 120-130/60.      Hyperlipidemia-on atorvastatin         Review of Systems    Allergies   Allergen Reactions    Other Dermatitis, Itching and Swelling     \"surgical glue\"    Wound " Dressing Adhesive Itching    Duloxetine Other (See Comments)     Other reaction(s): Decreased libido  DECREASED LIBIDO    Erythromycin Nausea Only    Formaldehyde Itching    Minocycline Swelling     GENERALIZED    Nsaids Other (See Comments)     Avoids due to CKD     Tea Tree Oil Other (See Comments)     Found during allergy test    Venlafaxine Other (See Comments)     Other reaction(s): Decreased libido  DECREASED LIBIDO.        Outpatient Medications Marked as Taking for the 1/25/24 encounter (Office Visit) with Leon Layton MD   Medication Sig Dispense Refill    acetaminophen (TYLENOL) 325 MG tablet Take 1 tablet by mouth Every 6 (Six) Hours As Needed.      allopurinol (ZYLOPRIM) 300 MG tablet Daily.      atenolol (TENORMIN) 50 MG tablet TAKE ONE TABLET BY MOUTH DAILY 30 tablet 0    atorvastatin (LIPITOR) 20 MG tablet Take 1 tablet by mouth Every Night. 90 tablet 2    bismuth subsalicylate (Pepto-Bismol) 262 MG chewable tablet Chew 2 tablets 2 (Two) Times a Day. 120 tablet 3    Calcium-Vitamin D-Vitamin K 500-500-40 MG-UNT-MCG chewable tablet Chew.      Cholecalciferol (VITAMIN D3) 5000 units chewable tablet Chew Daily.      empagliflozin (JARDIANCE) 10 MG tablet tablet Take 1 tablet by mouth.      estradiol (CLIMARA) 0.075 MG/24HR patch       finasteride (PROSCAR) 5 MG tablet       furosemide (LASIX) 40 MG tablet       glucose blood test strip Use to test glucose once daily. Dx DM 2 E11.9 100 each 11    imipramine (TOFRANIL-PM) 75 MG capsule Take 1 capsule by mouth Every Night. 90 capsule 2    ketoconazole (NIZORAL) 2 % shampoo       levothyroxine (SYNTHROID, LEVOTHROID) 137 MCG tablet Take 1 tablet by mouth Daily. 90 tablet 2    metroNIDAZOLE (METROGEL) 0.75 % gel metronidazole 0.75 % topical gel      Nutritional Supplements (Silica) 12.5 MG capsule       omeprazole (priLOSEC) 40 MG capsule Take 1 capsule by mouth Daily. 90 capsule 3    Ozempic, 0.25 or 0.5 MG/DOSE, 2 MG/3ML solution pen-injector DIAL AND  INJECT UNDER THE SKIN 0.5 MG WEEKLY 3 mL 0    Peppermint Oil (IBgard) 90 MG capsule controlled-release       Probiotic Product capsule       Progesterone (PROMETRIUM) 100 MG capsule       valsartan (DIOVAN) 160 MG tablet Take 2 tablets by mouth Daily. 90 tablet 2    [DISCONTINUED] levothyroxine (SYNTHROID, LEVOTHROID) 100 MCG tablet           Past Medical History:   Diagnosis Date    Anxiety     Arthritis     Cardiomegaly     MILD    Cataract     Chronic kidney disease     Colon polyps     FOLLOWED BY DR. LUCY HILL    DDD (degenerative disc disease), lumbar     Diabetes mellitus 2021    Disease of thyroid gland     HYPOTHYROIDISM    Diverticulitis of colon     Elevated cholesterol     Excessive sleepiness     Fatty liver     GERD (gastroesophageal reflux disease)     Gout     Hard to intubate     History of cardiomegaly     History of leukocytosis     History of vitamin D deficiency     Hot flashes     HPV in female     Hyperlipidemia     Hypertension     Hypothyroidism     Insomnia     Leiomyoma     Low back pain     Lumbar radiculopathy     Lung disease     Obesity     DELIA (obstructive sleep apnea)     Panic disorder     Plantar fasciitis     Postmenopausal HRT (hormone replacement therapy)     PROGESTERONE AND ESTRADIAL    Pulmonary nodules     RIGHT MIDDLE LOBE    Renal insufficiency     Rosacea     Steatosis of liver     Tachycardia      Past Surgical History:   Procedure Laterality Date    BARIATRIC SURGERY  2006    Removed lapband 2010    CHOLECYSTECTOMY N/A 03/12/2002    WITH CHOLANGIOGRAM, DR. LYNNETTE CHAVEZ AT [facility]    COLONOSCOPY N/A 10/04/2016    4 SESSILE TUBULAR ADENOMA POLYPS IN HEPATIC FLEXURE, 1 SESSILE TUBULAR ADENOMA AND 1 HYPERPLASTIC POLYP IN RECTUM, 6 MM SESSILE HYPERPLASTIC POLYP IN SIGMOID, 2 SESSILE HYPERPLASTI POLYPS IN RECTUM, MULTIPLE SMALL AND LARGE DIVERTICULA, RESCOPE IN 3 YRS, DR. LUCY HILL AT [facility]    COLONOSCOPY N/A 08/19/2003    ENTIRE COLON WNL, DR. ARIAS THOMASON AT [facility]     COLONOSCOPY N/A 10/07/2008    MULTIPLE LARGE MOUTHED DIVERTICULA IN SIGMOID, RESCOPE IN 5 YRS, DR. ARIAS THOMASON AT [facility]    COLONOSCOPY N/A 06/21/2011    DIVERTICULOSIS, MILD COLONIC SPASM, RESCOPE IN 5 YRS, DR. ARIAS THOMASON AT [facility]    COLONOSCOPY N/A 10/10/2019    5 MM HYPERPLASTIC POLYP IN CECUM, 5 MM HYPERPLASTIC POLYP IN ASCENDING, 5 MM HYPERPLASTIC POLYP IN TRANSVERSE, 5 MM HYPERPLASTIC POLYP IN DESCENDING, RESCOPE IN 3 YRS, DR. LUCY HILL AT [facility]    COLONOSCOPY N/A 11/29/2022    Procedure: COLONOSCOPY to cecum and TI;  with biopsies, cold bx polyps,;  Surgeon: Kathy Araya MD;  Location: [facility] ENDOSCOPY;  Service: Gastroenterology;  Laterality: N/A;  pre:  Diarrhea, left lower quadrant pain, abnormal CT scan of the sigmoid colon  post:  polyps, diverticulosis, abnormal colon mucosa, hemorrhoids    COSMETIC SURGERY N/A 1995    ABDOMINOPLASTY AND LIPOSUCTION, DR. MAUREEN [facility]    COSMETIC SURGERY Bilateral 1997    ARM REDUCTION, BRACHIOPLASTY, DR. MAUREEN [facility]    COSMETIC SURGERY Bilateral     UPPER EYELID BLEPHAROPLASTY    COSMETIC SURGERY N/A 10/13/2000    LIPECTOMY OF ABDOMINAL WALL, DR. MAUREEN [facility]    CYSTOSCOPY N/A 03/19/2014    DR. JERMAINE MURRIETA AT [facility]    DIAGNOSTIC LAPAROSCOPY N/A 06/04/2010    DEBRIDEMENT OF ABDOMINAL WALL, DR. SILVERIO AT [facility]    DILATATION AND CURETTAGE N/A 02/2003    ENDOSCOPY N/A 01/13/2006    REFLUX ESOPHAGITIS, OTHERWISE WNL, DR. JOSE J WELLS AT [facility]    ENDOSCOPY N/A 11/29/2022    Procedure: ESOPHAGOGASTRODUODENOSCOPY iwth biopsies;  Surgeon: Kathy Araya MD;  Location: [facility] ENDOSCOPY;  Service: Gastroenterology;  Laterality: N/A;  Pre:  epigastric pain  post:  gastritis, esophagitis,     GASTRIC BANDING REMOVAL N/A 07/2011    GASTRIC PORT CHANGE N/A     DR. SILVERIO AT [facility]    HERNIA REPAIR N/A 03/12/2002    VENTRAL HERNIA REPAIR, DR. KAITLYNN CHAVEZ AT [facility]    LAPAROSCOPIC GASTRIC BANDING N/A 02/21/2017    DR. SILVERIO  "AT [facility]    LAPAROSCOPIC LYSIS OF ADHESIONS N/A 11/05/2018    DR. LORI HILL AT [facility]    SHOULDER ARTHROSCOPY W/ LABRAL REPAIR Left     AND DEBRIDEMENT OF ROTATOR CUFF    TENSION FREE VAGINAL TAPING WITH MINI ARC SLING N/A 03/19/2004    DR. JERMAINE MURRIETA AT [facility]    TUBAL ABDOMINAL LIGATION Bilateral 1982    UPPER GASTROINTESTINAL ENDOSCOPY  2022     Family History   Problem Relation Age of Onset    Heart murmur Mother     Hyperlipidemia Mother     Heart disease Mother     Hypertension Mother     Alcohol abuse Father     Liver disease Father     Cirrhosis Father     COPD Father     Liver cancer Father     Hypertension Sister     Heart disease Sister     Heart disease Brother     Basal cell carcinoma Brother     Hypertension Brother     Diabetes Brother     Gout Brother     Prostate cancer Brother     Valvular heart disease Daughter     No Known Problems Son     Cancer Maternal Grandmother     Suicidality Paternal Grandfather     reports that she quit smoking about 32 years ago. Her smoking use included cigarettes. She has a 10.00 pack-year smoking history. She has never used smokeless tobacco. She reports current alcohol use. She reports that she does not use drugs.    OBJECTIVE    Vital Signs:   /64   Pulse 90   Ht 147.3 cm (57.99\")   Wt 76.7 kg (169 lb)   SpO2 97%   BMI 35.33 kg/m²     Physical Exam  Constitutional:       Appearance: Normal appearance. She is normal weight.   Cardiovascular:      Rate and Rhythm: Normal rate and regular rhythm.      Pulses:           Dorsalis pedis pulses are 2+ on the right side and 2+ on the left side.      Heart sounds: Normal heart sounds. No murmur heard.  Pulmonary:      Effort: Pulmonary effort is normal.      Breath sounds: Normal breath sounds.   Feet:      Right foot:      Protective Sensation: 7 sites tested.  7 sites sensed.      Skin integrity: No dry skin.      Toenail Condition: Right toenails are normal.      Left foot:      Protective " Sensation: 7 sites tested.  7 sites sensed.      Skin integrity: Dry skin present.      Toenail Condition: Left toenails are normal.   Skin:     General: Skin is warm and dry.   Neurological:      Mental Status: She is alert.   Psychiatric:         Mood and Affect: Mood normal.         Behavior: Behavior normal.         Thought Content: Thought content normal.            The following data was reviewed by: Leon Layton MD on 01/25/2024:  CMP          10/25/2023    11:18 11/16/2023    15:10 12/12/2023    10:06   CMP   Glucose 184  124  142    BUN 23  41  19    Creatinine 1.48  1.87  1.50    EGFR  28.5  37.1    Sodium 138  144  139    Potassium 4.8  4.6  3.9    Chloride 101  101  103    Calcium 10.2  9.5  9.0    Total Protein 6.8      Total Protein  6.6  6.3    Albumin 4.6  3.9  3.7    Globulin 2.2      Globulin  2.7  2.6    Total Bilirubin 0.4  0.6  0.5    Alkaline Phosphatase 181  176  141    AST (SGOT) 12  12  13    ALT (SGPT) 13  19  12    Albumin/Globulin Ratio  1.4  1.4    BUN/Creatinine Ratio 15.5  21.9  12.7    Anion Gap  16.5  8.8      CBC w/diff          10/25/2023    11:18 11/16/2023    14:13 12/12/2023    10:06   CBC w/Diff   WBC 16.43  16.70  9.77    RBC 5.04  5.17  4.36    Hemoglobin 14.9  15.3  13.5    Hematocrit 44.1  44.0  39.3    MCV 87.5  85.1  90.1    MCH 29.6  29.6  31.0    MCHC 33.8  34.8  34.4    RDW 13.5  13.8  17.0    Platelets 472  298  306    Neutrophil Rel % 82.1  73.1  52.2    Immature Granulocyte Rel %  1.4  3.4    Lymphocyte Rel % 12.4  17.1  34.7    Monocyte Rel % 3.7  7.2  7.8    Eosinophil Rel % 0.1  1.0  1.2    Basophil Rel % 0.4  0.2  0.7        TSH          10/25/2023    11:18   TSH   TSH 0.486      A1C Last 3 Results          3/31/2023    10:06 10/25/2023    11:18   HGBA1C Last 3 Results   Hemoglobin A1C 5.8     6.10       Details          This result is from an external source.             Data reviewed : recent Oncology notes              ASSESSMENT & PLAN     Diagnoses and all  orders for this visit:    1. Type 2 diabetes mellitus with stage 4 chronic kidney disease, without long-term current use of insulin (Primary)  -     Hemoglobin A1c    2. Hyperlipidemia, unspecified hyperlipidemia type  -     Lipid Panel With LDL / HDL Ratio    3. Primary hypertension    4. Morbidly obese    5. Thyroid eye disease  -     TSH Rfx On Abnormal To Free T4    6. Hypothyroidism, unspecified type  -     levothyroxine (SYNTHROID, LEVOTHROID) 137 MCG tablet; Take 1 tablet by mouth Daily.  Dispense: 90 tablet; Refill: 2  -     TSH Rfx On Abnormal To Free T4      Diabetes well-controlled on Ozempic.  Jardiance is a good choice for her given her chronic kidney disease.  Consider increasing dose to help with this.  Discussed that she really does not need to see an endocrinologist given her diabetes is under good control unless she prefers this.  I am comfortable managing her levothyroxine.  Checking TSH today.    Blood pressure under good control.  Continue current management.        Health Maintenance Due   Topic Date Due    TDAP/TD VACCINES (1 - Tdap) Never done    ZOSTER VACCINE (1 of 2) Never done    INFLUENZA VACCINE  Never done        Follow Up  Return in about 4 months (around 6/4/2024).    Patient/family had no further questions at this time and verbalized understanding of the plan discussed today.

## 2024-01-30 RX ORDER — SEMAGLUTIDE 0.68 MG/ML
INJECTION, SOLUTION SUBCUTANEOUS
Qty: 3 ML | Refills: 0 | Status: SHIPPED | OUTPATIENT
Start: 2024-01-30 | End: 2024-01-31

## 2024-01-31 ENCOUNTER — TELEPHONE (OUTPATIENT)
Dept: INTERNAL MEDICINE | Facility: CLINIC | Age: 74
End: 2024-01-31
Payer: MEDICARE

## 2024-01-31 RX ORDER — SEMAGLUTIDE 1.34 MG/ML
1 INJECTION, SOLUTION SUBCUTANEOUS WEEKLY
Qty: 3 ML | Refills: 2 | Status: SHIPPED | OUTPATIENT
Start: 2024-01-31

## 2024-01-31 NOTE — TELEPHONE ENCOUNTER
Patient should continue on the 1 mg dose.  Ozempic does not come in 1.5 mg dose.  Since 1 mg seems to be working well for her diabetes we will continue at this dose for now.

## 2024-01-31 NOTE — TELEPHONE ENCOUNTER
Spoke with patient and gave her Dr. Layton's instructions/recommendations. Pt verbalized understanding.

## 2024-01-31 NOTE — TELEPHONE ENCOUNTER
Caller: Sravanthi Burns    Relationship: Self    Best call back number: [phone]    Which medication are you concerned about:     Ozempic, 0.25 or 0.5 MG/DOSE, 2 MG/3ML solution pen-injector       Who prescribed you this medication: DR BOOTH    What are your concerns: PATIENT ASKS WHY DOSAGE WAS CHANGED AND WHY PRESCRIPTION WAS SENT TO PHARMACY? PATIENT STATES SHE HAS NOT STARTED ON THE 1.5 MG YET, SHE HAS ABOUT A 6 WEEK SUPPLY OF MEDICATION  PLEASE ADVISE

## 2024-02-28 ENCOUNTER — TELEPHONE (OUTPATIENT)
Dept: ONCOLOGY | Facility: CLINIC | Age: 74
End: 2024-02-28
Payer: MEDICARE

## 2024-02-28 NOTE — TELEPHONE ENCOUNTER
Caller: Sravanthi Burns    Relationship: Self    Best call back number: [phone]    What was the call regarding: PATIENTS NEPHROLOGIST WANTS HER TO COME BACK AND SEE DR BEATTY FOR HER WHITE BLOOD COUNT 23.6      PLEASE CALL TO ADVISE

## 2024-03-11 ENCOUNTER — OFFICE VISIT (OUTPATIENT)
Dept: ENDOCRINOLOGY | Age: 74
End: 2024-03-11
Payer: MEDICARE

## 2024-03-11 VITALS
OXYGEN SATURATION: 98 % | SYSTOLIC BLOOD PRESSURE: 126 MMHG | WEIGHT: 159.4 LBS | HEART RATE: 102 BPM | DIASTOLIC BLOOD PRESSURE: 74 MMHG | HEIGHT: 58 IN | BODY MASS INDEX: 33.46 KG/M2

## 2024-03-11 DIAGNOSIS — N18.4 TYPE 2 DIABETES MELLITUS WITH STAGE 4 CHRONIC KIDNEY DISEASE, WITHOUT LONG-TERM CURRENT USE OF INSULIN: Primary | Chronic | ICD-10-CM

## 2024-03-11 DIAGNOSIS — E11.22 TYPE 2 DIABETES MELLITUS WITH STAGE 4 CHRONIC KIDNEY DISEASE, WITHOUT LONG-TERM CURRENT USE OF INSULIN: Primary | Chronic | ICD-10-CM

## 2024-03-11 DIAGNOSIS — E03.9 HYPOTHYROIDISM, UNSPECIFIED TYPE: ICD-10-CM

## 2024-03-11 DIAGNOSIS — E66.9 CLASS 1 OBESITY WITH SERIOUS COMORBIDITY AND BODY MASS INDEX (BMI) OF 33.0 TO 33.9 IN ADULT, UNSPECIFIED OBESITY TYPE: ICD-10-CM

## 2024-03-11 PROCEDURE — 99204 OFFICE O/P NEW MOD 45 MIN: CPT | Performed by: INTERNAL MEDICINE

## 2024-03-11 PROCEDURE — 3078F DIAST BP <80 MM HG: CPT | Performed by: INTERNAL MEDICINE

## 2024-03-11 PROCEDURE — 1159F MED LIST DOCD IN RCRD: CPT | Performed by: INTERNAL MEDICINE

## 2024-03-11 PROCEDURE — 3044F HG A1C LEVEL LT 7.0%: CPT | Performed by: INTERNAL MEDICINE

## 2024-03-11 PROCEDURE — 1160F RVW MEDS BY RX/DR IN RCRD: CPT | Performed by: INTERNAL MEDICINE

## 2024-03-11 PROCEDURE — 3074F SYST BP LT 130 MM HG: CPT | Performed by: INTERNAL MEDICINE

## 2024-03-11 NOTE — PROGRESS NOTES
Referring provider: Leon Layton MD     Chief complaint/Reason for consult:  T2DM    HPI:   - 73 year old female here for management of diabetes mellitus type 2  - Has had diabetes since 2021  - No known complications to date (she does have CKD but it is unclear if this is from her diabetes or not)  - Is currently taking Ozempic 1 mg weekly and Jardiance 10 mg daily  - Denies hypoglycemia  - Glucometer/Insulin pump review shows  - Is on levothyroxine 137 mcg daily  - She also has CRYSTAL and sees an ophthalmologist  - She does states she has had difficulty losing weight for many years    The following portions of the patient's history were reviewed and updated as appropriate: allergies, current medications, past family history, past medical history, past social history, past surgical history, and problem list.    Objective     Vitals:    03/11/24 1145   BP: 126/74   Pulse: 102   SpO2: 98%        Physical Exam  Vitals reviewed.   Constitutional:       Appearance: Normal appearance.   HENT:      Head: Normocephalic and atraumatic.   Eyes:      General: No scleral icterus.  Pulmonary:      Effort: Pulmonary effort is normal. No respiratory distress.   Neurological:      Mental Status: She is alert.      Gait: Gait normal.   Psychiatric:         Mood and Affect: Mood normal.         Behavior: Behavior normal.         Thought Content: Thought content normal.         Judgment: Judgment normal.         Labs/Imaging:  A1c was 5.5%, TSH was 8.08 in 1/2024    Assessment & Plan   T2DM, controlled  2.   Obesity (BMI of 33)  - Will increase Ozempic to 2 mg to see if this will help with her weight loss    3. Hypothyroidism  - Will order repeat TSH and free T4  - She is on levothyroxine 137 mcg daily    - Return to clinic in 6 months

## 2024-03-13 ENCOUNTER — OFFICE VISIT (OUTPATIENT)
Dept: GASTROENTEROLOGY | Facility: CLINIC | Age: 74
End: 2024-03-13
Payer: MEDICARE

## 2024-03-13 VITALS
DIASTOLIC BLOOD PRESSURE: 68 MMHG | SYSTOLIC BLOOD PRESSURE: 120 MMHG | WEIGHT: 160.8 LBS | TEMPERATURE: 96.8 F | HEART RATE: 109 BPM | BODY MASS INDEX: 33.75 KG/M2 | HEIGHT: 58 IN

## 2024-03-13 DIAGNOSIS — K52.832 LYMPHOCYTIC COLITIS: Primary | ICD-10-CM

## 2024-03-13 DIAGNOSIS — K21.9 GERD WITHOUT ESOPHAGITIS: ICD-10-CM

## 2024-03-13 DIAGNOSIS — Z86.010 HISTORY OF COLON POLYPS: ICD-10-CM

## 2024-03-13 PROCEDURE — 99214 OFFICE O/P EST MOD 30 MIN: CPT | Performed by: INTERNAL MEDICINE

## 2024-03-13 PROCEDURE — 1160F RVW MEDS BY RX/DR IN RCRD: CPT | Performed by: INTERNAL MEDICINE

## 2024-03-13 PROCEDURE — 3074F SYST BP LT 130 MM HG: CPT | Performed by: INTERNAL MEDICINE

## 2024-03-13 PROCEDURE — 1159F MED LIST DOCD IN RCRD: CPT | Performed by: INTERNAL MEDICINE

## 2024-03-13 PROCEDURE — 3078F DIAST BP <80 MM HG: CPT | Performed by: INTERNAL MEDICINE

## 2024-03-13 RX ORDER — OMEPRAZOLE 40 MG/1
40 CAPSULE, DELAYED RELEASE ORAL DAILY
Qty: 90 CAPSULE | Refills: 3 | Status: SHIPPED | OUTPATIENT
Start: 2024-03-13

## 2024-03-13 NOTE — PROGRESS NOTES
Subjective   Chief Complaint   Patient presents with    lymphocytis colitis       Sravanthi Burns is a  73 y.o. female here for a follow up visit for lymphocytic colitis. She completed course of budesonide for this with good response.  She has had no symptoms since.  She occasionally gets a little bit constipated but this has been better since she started a fiber supplement twice a day.  She is trying to increase her water.    Patient acid reflux at night.  She takes her omeprazole at bedtime.  No difficulty swallowing nausea or vomiting.    She is she has a history of [id number] repeat colonoscopy due in 2027  HPI  Past Medical History:   Diagnosis Date    Anxiety     Arthritis     Cardiomegaly     MILD    Cataract     Chronic kidney disease     Colon polyps     FOLLOWED BY DR. LUCY HILL    DDD (degenerative disc disease), lumbar     Diabetes mellitus 2021    Disease of thyroid gland     HYPOTHYROIDISM    Diverticulitis of colon     Elevated cholesterol     Excessive sleepiness     Fatty liver     GERD (gastroesophageal reflux disease)     Gout     Hard to intubate     History of cardiomegaly     History of leukocytosis     History of vitamin D deficiency     Hot flashes     HPV in female     Hyperlipidemia     Hypertension     Hypothyroidism     Insomnia     Leiomyoma     Low back pain     Lumbar radiculopathy     Lung disease     Obesity     DELIA (obstructive sleep apnea)     Panic disorder     Plantar fasciitis     Postmenopausal HRT (hormone replacement therapy)     PROGESTERONE AND ESTRADIAL    Pulmonary nodules     RIGHT MIDDLE LOBE    Renal insufficiency     Rosacea     Steatosis of liver     Tachycardia     Type 2 diabetes mellitus 2020     Past Surgical History:   Procedure Laterality Date    BARIATRIC SURGERY  2006    Removed lapband 2010    CHOLECYSTECTOMY N/A 03/12/2002    WITH CHOLANGIOGRAM, DR. LYNNETTE CHAVEZ AT [facility]    COLONOSCOPY N/A 10/04/2016    4 SESSILE TUBULAR ADENOMA POLYPS IN HEPATIC  FLEXURE, 1 SESSILE TUBULAR ADENOMA AND 1 HYPERPLASTIC POLYP IN RECTUM, 6 MM SESSILE HYPERPLASTIC POLYP IN SIGMOID, 2 SESSILE HYPERPLASTI POLYPS IN RECTUM, MULTIPLE SMALL AND LARGE DIVERTICULA, RESCOPE IN 3 YRS, DR. LUCY HILL AT [facility]    COLONOSCOPY N/A 08/19/2003    ENTIRE COLON WNL, DR. ARIAS THOMASON AT [facility]    COLONOSCOPY N/A 10/07/2008    MULTIPLE LARGE MOUTHED DIVERTICULA IN SIGMOID, RESCOPE IN 5 YRS, DR. ARIAS THOMASON AT [facility]    COLONOSCOPY N/A 06/21/2011    DIVERTICULOSIS, MILD COLONIC SPASM, RESCOPE IN 5 YRS, DR. ARIAS THOMASON AT [facility]    COLONOSCOPY N/A 10/10/2019    5 MM HYPERPLASTIC POLYP IN CECUM, 5 MM HYPERPLASTIC POLYP IN ASCENDING, 5 MM HYPERPLASTIC POLYP IN TRANSVERSE, 5 MM HYPERPLASTIC POLYP IN DESCENDING, RESCOPE IN 3 YRS, DR. LUCY HILL AT [facility]    COLONOSCOPY N/A 11/29/2022    Procedure: COLONOSCOPY to cecum and TI;  with biopsies, cold bx polyps,;  Surgeon: Kathy Araya MD;  Location: [facility] ENDOSCOPY;  Service: Gastroenterology;  Laterality: N/A;  pre:  Diarrhea, left lower quadrant pain, abnormal CT scan of the sigmoid colon  post:  polyps, diverticulosis, abnormal colon mucosa, hemorrhoids    COSMETIC SURGERY N/A 1995    ABDOMINOPLASTY AND LIPOSUCTION, DR. MAUREEN WATERHOUSE    COSMETIC SURGERY Bilateral 1997    ARM REDUCTION, BRACHIOPLASTY, DR. MAUREEN WATERHOUSE    COSMETIC SURGERY Bilateral     UPPER EYELID BLEPHAROPLASTY    COSMETIC SURGERY N/A 10/13/2000    LIPECTOMY OF ABDOMINAL WALL, DR. MAUREEN [facility]HOUSE    CYSTOSCOPY N/A 03/19/2014    DR. JERMAINE MURRIETA AT [facility]    DIAGNOSTIC LAPAROSCOPY N/A 06/04/2010    DEBRIDEMENT OF ABDOMINAL WALL, DR. SILVERIO AT [facility]    DILATATION AND CURETTAGE N/A 02/2003    ENDOSCOPY N/A 01/13/2006    REFLUX ESOPHAGITIS, OTHERWISE WNL, DR. JOSE J WELLS AT [facility]    ENDOSCOPY N/A 11/29/2022    Procedure: ESOPHAGOGASTRODUODENOSCOPY iwth biopsies;  Surgeon: Kathy Araya MD;  Location: [facility] ENDOSCOPY;  Service: Gastroenterology;   Laterality: N/A;  Pre:  epigastric pain  post:  gastritis, esophagitis,     GASTRIC BANDING REMOVAL N/A 07/2011    GASTRIC PORT CHANGE N/A     DR. SILVERIO AT [facility] [place]    HERNIA REPAIR N/A 03/12/2002    VENTRAL HERNIA REPAIR, DR. KAITLYNN CHAVEZ AT [facility]    LAPAROSCOPIC GASTRIC BANDING N/A 02/21/2017    DR. SILVERIO AT [facility]    LAPAROSCOPIC LYSIS OF ADHESIONS N/A 11/05/2018    DR. LORI HILL AT [place]    SHOULDER ARTHROSCOPY W/ LABRAL REPAIR Left     AND DEBRIDEMENT OF ROTATOR CUFF    TENSION FREE VAGINAL TAPING WITH MINI ARC SLING N/A 03/19/2004    DR. JERMAINE MURRIETA AT [facility]    TUBAL ABDOMINAL LIGATION Bilateral 1982    UPPER GASTROINTESTINAL ENDOSCOPY  2022       Current Outpatient Medications:     acetaminophen (TYLENOL) 325 MG tablet, Take 1 tablet by mouth Every 6 (Six) Hours As Needed., Disp: , Rfl:     allopurinol (ZYLOPRIM) 300 MG tablet, Daily., Disp: , Rfl:     atenolol (TENORMIN) 50 MG tablet, TAKE ONE TABLET BY MOUTH DAILY, Disp: 30 tablet, Rfl: 0    atorvastatin (LIPITOR) 20 MG tablet, Take 1 tablet by mouth Every Night., Disp: 90 tablet, Rfl: 2    bismuth subsalicylate (Pepto-Bismol) 262 MG chewable tablet, Chew 2 tablets 2 (Two) Times a Day., Disp: 120 tablet, Rfl: 3    Calcium-Vitamin D-Vitamin K 500-500-40 MG-UNT-MCG chewable tablet, Chew., Disp: , Rfl:     Cholecalciferol (VITAMIN D3) 5000 units chewable tablet, Chew Daily., Disp: , Rfl:     empagliflozin (Jardiance) 10 MG tablet tablet, Take 1 tablet by mouth Daily., Disp: , Rfl:     empagliflozin (Jardiance) 10 MG tablet tablet, Take  by mouth Daily., Disp: , Rfl:     estradiol (CLIMARA) 0.075 MG/24HR patch, , Disp: , Rfl:     finasteride (PROSCAR) 5 MG tablet, , Disp: , Rfl:     furosemide (LASIX) 40 MG tablet, , Disp: , Rfl:     glucose blood test strip, Use to test glucose once daily. Dx DM 2 E11.9, Disp: 100 each, Rfl: 11    imipramine (TOFRANIL-PM) 75 MG capsule, Take 1 capsule by mouth Every Night., Disp: 90 capsule,  "Rfl: 2    ketoconazole (NIZORAL) 2 % shampoo, , Disp: , Rfl:     levothyroxine (SYNTHROID, LEVOTHROID) 137 MCG tablet, Take 1 tablet by mouth Daily., Disp: 90 tablet, Rfl: 2    metroNIDAZOLE (METROGEL) 0.75 % gel, metronidazole 0.75 % topical gel, Disp: , Rfl:     Nutritional Supplements (Silica) 12.5 MG capsule, , Disp: , Rfl:     omeprazole (priLOSEC) 40 MG capsule, Take 1 capsule by mouth Daily., Disp: 90 capsule, Rfl: 3    Peppermint Oil (IBgard) 90 MG capsule controlled-release, , Disp: , Rfl:     Probiotic Product capsule, , Disp: , Rfl:     Progesterone (PROMETRIUM) 100 MG capsule, , Disp: , Rfl:     Semaglutide, 2 MG/DOSE, (OZEMPIC) 8 MG/3ML solution pen-injector, Inject 2 mg under the skin into the appropriate area as directed 1 (One) Time Per Week., Disp: 3 mL, Rfl: 5    valsartan (DIOVAN) 160 MG tablet, Take 2 tablets by mouth Daily., Disp: 90 tablet, Rfl: 2    glucose blood test strip, USE 1 STRIP TO TEST BLOOD GLUCOSE DAILY AS DIRECTED (Patient not taking: Reported on 3/13/2024), Disp: , Rfl:   PRN Meds:.  Allergies   Allergen Reactions    Other Dermatitis, Itching and Swelling     \"surgical glue\"    Wound Dressing Adhesive Itching    Duloxetine Other (See Comments)     Other reaction(s): Decreased libido  DECREASED LIBIDO    Erythromycin Nausea Only    Formaldehyde Itching    Minocycline Swelling     GENERALIZED    Nsaids Other (See Comments)     Avoids due to CKD     Tea Tree Oil Other (See Comments)     Found during allergy test    Venlafaxine Other (See Comments)     Other reaction(s): Decreased libido  DECREASED LIBIDO.     Social History     Socioeconomic History    Marital status:      Spouse name: Lincoln    Number of children: 2   Tobacco Use    Smoking status: Former     Current packs/day: 0.00     Average packs/day: 0.5 packs/day for 20.0 years (10.0 ttl pk-yrs)     Types: Cigarettes     Start date: 1972     Quit date: 1992     Years since quittin.2    Smokeless tobacco: " Never   Substance and Sexual Activity    Alcohol use: Yes     Comment: Do not drink daily or weekly    Drug use: No    Sexual activity: Yes     Partners: Male     Birth control/protection: Post-menopausal, Tubal ligation     Family History   Problem Relation Age of Onset    Heart murmur Mother     Hyperlipidemia Mother     Heart disease Mother     Hypertension Mother     Alcohol abuse Father     Liver disease Father     Cirrhosis Father     COPD Father     Liver cancer Father     Hypertension Sister     Heart disease Sister     Heart disease Brother     Basal cell carcinoma Brother     Hypertension Brother     Diabetes Brother     Gout Brother     Prostate cancer Brother     Obesity Brother     Valvular heart disease Daughter     No Known Problems Son     Cancer Maternal Grandmother     Suicidality Paternal Grandfather      Review of Systems   Constitutional:  Negative for appetite change and unexpected weight change.   HENT:  Negative for trouble swallowing.    Gastrointestinal:  Negative for diarrhea and nausea.     Vitals:    03/13/24 1530   BP: 120/68   Pulse: 109   Temp: 96.8 °F (36 °C)         03/13/24  1530   Weight: 72.9 kg (160 lb 12.8 oz)       Objective   Physical Exam  Constitutional:       Appearance: Normal appearance. She is well-developed.   HENT:      Head: Normocephalic and atraumatic.   Eyes:      General: No scleral icterus.     Conjunctiva/sclera: Conjunctivae normal.   Pulmonary:      Effort: Pulmonary effort is normal.   Abdominal:      General: There is no distension.      Palpations: Abdomen is soft.   Musculoskeletal:      Cervical back: Normal range of motion and neck supple.   Skin:     General: Skin is warm and dry.   Neurological:      Mental Status: She is alert.   Psychiatric:         Mood and Affect: Mood normal.         Behavior: Behavior normal.       No radiology results for the last 7 days    Assessment & Plan   Diagnoses and all orders for this visit:    Lymphocytic  colitis    GERD without esophagitis    History of colon polyps    Other orders  -     empagliflozin (Jardiance) 10 MG tablet tablet; Take  by mouth Daily.  -     glucose blood test strip; USE 1 STRIP TO TEST BLOOD GLUCOSE DAILY AS DIRECTED (Patient not taking: Reported on 3/13/2024)  -     omeprazole (priLOSEC) 40 MG capsule; Take 1 capsule by mouth Daily.      Plan:  Symptoms of lymphocytic colitis have resolved  Continue omeprazole-recommend predinner dosing to better cover the evening hours.  Recommend diet and lifestyle modification to reduce acid reflux symptoms such as eating small meals, reducing fat caffeine and alcohol in the diet, avoid eating close to bedtime, eliminate excess weight if applicable.  Repeat colonoscopy in 2027  Office follow-up in 1 year or sooner if needed

## 2024-03-15 ENCOUNTER — PATIENT ROUNDING (BHMG ONLY) (OUTPATIENT)
Dept: ENDOCRINOLOGY | Age: 74
End: 2024-03-15
Payer: MEDICARE

## 2024-03-18 ENCOUNTER — TELEPHONE (OUTPATIENT)
Dept: ONCOLOGY | Facility: CLINIC | Age: 74
End: 2024-03-18
Payer: MEDICARE

## 2024-03-18 ENCOUNTER — LAB (OUTPATIENT)
Dept: LAB | Facility: HOSPITAL | Age: 74
End: 2024-03-18
Payer: MEDICARE

## 2024-03-18 ENCOUNTER — TELEPHONE (OUTPATIENT)
Dept: ENDOCRINOLOGY | Age: 74
End: 2024-03-18
Payer: MEDICARE

## 2024-03-18 ENCOUNTER — OFFICE VISIT (OUTPATIENT)
Dept: ONCOLOGY | Facility: CLINIC | Age: 74
End: 2024-03-18
Payer: MEDICARE

## 2024-03-18 VITALS
OXYGEN SATURATION: 95 % | BODY MASS INDEX: 33.94 KG/M2 | HEIGHT: 58 IN | WEIGHT: 161.7 LBS | HEART RATE: 95 BPM | RESPIRATION RATE: 16 BRPM | DIASTOLIC BLOOD PRESSURE: 70 MMHG | TEMPERATURE: 98.2 F | SYSTOLIC BLOOD PRESSURE: 122 MMHG

## 2024-03-18 DIAGNOSIS — D47.1 CHRONIC MYELOPROLIFERATIVE DISEASE: ICD-10-CM

## 2024-03-18 DIAGNOSIS — D75.839 THROMBOCYTOSIS: ICD-10-CM

## 2024-03-18 DIAGNOSIS — N18.4 TYPE 2 DIABETES MELLITUS WITH STAGE 4 CHRONIC KIDNEY DISEASE, WITHOUT LONG-TERM CURRENT USE OF INSULIN: Primary | ICD-10-CM

## 2024-03-18 DIAGNOSIS — D72.825 BANDEMIA: ICD-10-CM

## 2024-03-18 DIAGNOSIS — R94.6 ABNORMAL RESULTS OF THYROID FUNCTION STUDIES: ICD-10-CM

## 2024-03-18 DIAGNOSIS — D72.823 LEUKEMOID REACTION: ICD-10-CM

## 2024-03-18 DIAGNOSIS — E11.22 TYPE 2 DIABETES MELLITUS WITH STAGE 4 CHRONIC KIDNEY DISEASE, WITHOUT LONG-TERM CURRENT USE OF INSULIN: Primary | ICD-10-CM

## 2024-03-18 DIAGNOSIS — D72.823 LEUKEMOID REACTION: Primary | ICD-10-CM

## 2024-03-18 LAB
ALBUMIN SERPL-MCNC: 3.5 G/DL (ref 3.5–5.2)
ALBUMIN/GLOB SERPL: 1.3 G/DL
ALP SERPL-CCNC: 142 U/L (ref 39–117)
ALT SERPL W P-5'-P-CCNC: 8 U/L (ref 1–33)
ANION GAP SERPL CALCULATED.3IONS-SCNC: 13.1 MMOL/L (ref 5–15)
AST SERPL-CCNC: 16 U/L (ref 1–32)
BACTERIA UR QL AUTO: ABNORMAL /HPF
BASOPHILS # BLD AUTO: 0.21 10*3/MM3 (ref 0–0.2)
BASOPHILS NFR BLD AUTO: 1.9 % (ref 0–1.5)
BILIRUB SERPL-MCNC: 0.5 MG/DL (ref 0–1.2)
BILIRUB UR QL STRIP: NEGATIVE
BUN SERPL-MCNC: 18 MG/DL (ref 8–23)
BUN/CREAT SERPL: 13.6 (ref 7–25)
CALCIUM SPEC-SCNC: 8.9 MG/DL (ref 8.6–10.5)
CHLORIDE SERPL-SCNC: 103 MMOL/L (ref 98–107)
CLARITY UR: ABNORMAL
CO2 SERPL-SCNC: 24.9 MMOL/L (ref 22–29)
COLOR UR: YELLOW
CREAT SERPL-MCNC: 1.32 MG/DL (ref 0.57–1)
DEPRECATED RDW RBC AUTO: 46.4 FL (ref 37–54)
EGFRCR SERPLBLD CKD-EPI 2021: 43.3 ML/MIN/1.73
EOSINOPHIL # BLD AUTO: 0.28 10*3/MM3 (ref 0–0.4)
EOSINOPHIL NFR BLD AUTO: 2.6 % (ref 0.3–6.2)
ERYTHROCYTE [DISTWIDTH] IN BLOOD BY AUTOMATED COUNT: 15.2 % (ref 12.3–15.4)
ERYTHROCYTE [SEDIMENTATION RATE] IN BLOOD: 12 MM/HR (ref 0–30)
GLOBULIN UR ELPH-MCNC: 2.6 GM/DL
GLUCOSE SERPL-MCNC: 130 MG/DL (ref 65–99)
GLUCOSE UR STRIP-MCNC: ABNORMAL MG/DL
HCT VFR BLD AUTO: 39.9 % (ref 34–46.6)
HGB BLD-MCNC: 14.4 G/DL (ref 12–15.9)
HGB UR QL STRIP.AUTO: ABNORMAL
HYALINE CASTS UR QL AUTO: ABNORMAL /LPF
IMM GRANULOCYTES # BLD AUTO: 0.78 10*3/MM3 (ref 0–0.05)
IMM GRANULOCYTES NFR BLD AUTO: 7.1 % (ref 0–0.5)
KETONES UR QL STRIP: NEGATIVE
LDH SERPL-CCNC: 203 U/L (ref 135–214)
LEUKOCYTE ESTERASE UR QL STRIP.AUTO: ABNORMAL
LYMPHOCYTES # BLD AUTO: 3.04 10*3/MM3 (ref 0.7–3.1)
LYMPHOCYTES NFR BLD AUTO: 27.8 % (ref 19.6–45.3)
MCH RBC QN AUTO: 31.6 PG (ref 26.6–33)
MCHC RBC AUTO-ENTMCNC: 36.1 G/DL (ref 31.5–35.7)
MCV RBC AUTO: 87.7 FL (ref 79–97)
MONOCYTES # BLD AUTO: 0.82 10*3/MM3 (ref 0.1–0.9)
MONOCYTES NFR BLD AUTO: 7.5 % (ref 5–12)
NEUTROPHILS NFR BLD AUTO: 5.8 10*3/MM3 (ref 1.7–7)
NEUTROPHILS NFR BLD AUTO: 53.1 % (ref 42.7–76)
NITRITE UR QL STRIP: NEGATIVE
NRBC BLD AUTO-RTO: 1.2 /100 WBC (ref 0–0.2)
PH UR STRIP.AUTO: 5.5 [PH] (ref 4.5–8)
PLATELET # BLD AUTO: 376 10*3/MM3 (ref 140–450)
PMV BLD AUTO: 9.2 FL (ref 6–12)
POTASSIUM SERPL-SCNC: 3 MMOL/L (ref 3.5–5.2)
PROT SERPL-MCNC: 6.1 G/DL (ref 6–8.5)
PROT UR QL STRIP: ABNORMAL
RBC # BLD AUTO: 4.55 10*6/MM3 (ref 3.77–5.28)
RBC # UR STRIP: ABNORMAL /HPF
REF LAB TEST METHOD: ABNORMAL
SODIUM SERPL-SCNC: 141 MMOL/L (ref 136–145)
SP GR UR STRIP: 1.01 (ref 1–1.03)
SQUAMOUS #/AREA URNS HPF: ABNORMAL /HPF
TSH SERPL DL<=0.05 MIU/L-ACNC: 0.16 UIU/ML (ref 0.27–4.2)
UROBILINOGEN UR QL STRIP: ABNORMAL
WBC # UR STRIP: ABNORMAL /HPF
WBC NRBC COR # BLD AUTO: 10.93 10*3/MM3 (ref 3.4–10.8)

## 2024-03-18 PROCEDURE — 85652 RBC SED RATE AUTOMATED: CPT | Performed by: INTERNAL MEDICINE

## 2024-03-18 PROCEDURE — 83615 LACTATE (LD) (LDH) ENZYME: CPT

## 2024-03-18 PROCEDURE — 88184 FLOWCYTOMETRY/ TC 1 MARKER: CPT

## 2024-03-18 PROCEDURE — 84443 ASSAY THYROID STIM HORMONE: CPT | Performed by: INTERNAL MEDICINE

## 2024-03-18 PROCEDURE — 85025 COMPLETE CBC W/AUTO DIFF WBC: CPT

## 2024-03-18 PROCEDURE — 81001 URINALYSIS AUTO W/SCOPE: CPT | Performed by: INTERNAL MEDICINE

## 2024-03-18 PROCEDURE — 88185 FLOWCYTOMETRY/TC ADD-ON: CPT

## 2024-03-18 PROCEDURE — 36415 COLL VENOUS BLD VENIPUNCTURE: CPT

## 2024-03-18 PROCEDURE — 88182 CELL MARKER STUDY: CPT

## 2024-03-18 PROCEDURE — 80053 COMPREHEN METABOLIC PANEL: CPT | Performed by: INTERNAL MEDICINE

## 2024-03-18 NOTE — TELEPHONE ENCOUNTER
Called patient. No answer. Left voicemail with direct line [phone] and a request to call back. Leah Landa RN

## 2024-03-18 NOTE — TELEPHONE ENCOUNTER
PATIENT IS COMING INTO OUR OFFICE TOMORROW FOR LABS. THE LABS IN HER CHART ARE EXTERNAL. CAN WE ENTER IN THE ORDERS AS LAB COLLECT SO THEY CAN CROSS OVER TO THE [facility]?

## 2024-03-18 NOTE — TELEPHONE ENCOUNTER
----- Message from Hira Haley MD sent at 3/18/2024 12:16 PM EDT -----  Call her k is low is she taking kcl po?

## 2024-03-18 NOTE — PROGRESS NOTES
Subjective         REASON FOR FOLLOW UP:  LEUKOCYTOSIS AND THROMBOCYTOSIS, TO RULE OUT MYELOPROLIFERATIVE DISORDER.    HISTORY OF PRESENT ILLNESS: On 11/16/2023 this 73-year-old female has been seen in consultation. Dr. Leon Layton, has referred her because it has been noticeable in her laboratory testing on 2 different occasions lately that she has had leukocytosis and thrombocytosis in absence of any infection. The patient has had only a soft tissue infection in the right eye that has been drained through her eye doctor and medication has been applied. She has not had any fever or chills. No other infection on any level. Her appetite and weight are stable. Her bowel function and urination are normal. No cardiac or respiratory symptomatology. The patient admits that she is more fatigued now than before. She has not had any rash or new joint pain with the exception of recent episode of gout.     On 12/12/2023 the patient returns to the office for follow up. In the interim laboratory assessment has been done to evaluate her leukocytosis and for review today. The patient feels well at this time. She has proper appetite, proper bowel activity, no difficulty with urine volume. If she does not take the Lasix she has volume accumulation and significant swelling. Otherwise no other new problems. She has not fever, no adenopathy, no rashes.     On 03/18/2024, this patient has been reviewed again as per request of Dr. Lopez, the patient's nephrologist, because laboratory testing performed in his office on 02/20/2024 disclosed a white count of 23,000 with a hemoglobin of 8 and a platelet count of 415,000. In the interval of time, the patient has not had any new symptoms of any nature. She feels perfectly normal with proper appetite, normal bowel function, normal urination. No cardiac or respiratory issues. The only problem that she has found recently is that her face is becoming more round and she is losing hair and her  nails are becoming yellow in color. She is not taking any supplements of any kind or any other new medication that she is aware of. She has not had any fever, chills, or infections of any kind. Today in the office we have documented that the patient has a normal white count, normal hemoglobin and normal platelets.        Past Medical History:   Diagnosis Date    Anxiety     Arthritis     Cardiomegaly     MILD    Cataract     Chronic kidney disease     Colon polyps     FOLLOWED BY DR. LUCY HILL    DDD (degenerative disc disease), lumbar     Diabetes mellitus 2021    Disease of thyroid gland     HYPOTHYROIDISM    Diverticulitis of colon     Elevated cholesterol     Excessive sleepiness     Fatty liver     GERD (gastroesophageal reflux disease)     Gout     Hard to intubate     History of cardiomegaly     History of leukocytosis     History of vitamin D deficiency     Hot flashes     HPV in female     Hyperlipidemia     Hypertension     Hypothyroidism     Insomnia     Leiomyoma     Low back pain     Lumbar radiculopathy     Lung disease     Obesity     DELIA (obstructive sleep apnea)     Panic disorder     Plantar fasciitis     Postmenopausal HRT (hormone replacement therapy)     PROGESTERONE AND ESTRADIAL    Pulmonary nodules     RIGHT MIDDLE LOBE    Renal insufficiency     Rosacea     Steatosis of liver     Tachycardia     Type 2 diabetes mellitus 2020        Past Surgical History:   Procedure Laterality Date    BARIATRIC SURGERY  2006    Removed lapband 2010    CHOLECYSTECTOMY N/A 03/12/2002    WITH CHOLANGIOGRAM, DR. LYNNETTE CHAVEZ AT [facility]    COLONOSCOPY N/A 10/04/2016    4 SESSILE TUBULAR ADENOMA POLYPS IN HEPATIC FLEXURE, 1 SESSILE TUBULAR ADENOMA AND 1 HYPERPLASTIC POLYP IN RECTUM, 6 MM SESSILE HYPERPLASTIC POLYP IN SIGMOID, 2 SESSILE HYPERPLASTI POLYPS IN RECTUM, MULTIPLE SMALL AND LARGE DIVERTICULA, RESCOPE IN 3 YRS, DR. LUCY HILL AT [facility]    COLONOSCOPY N/A 08/19/2003    ENTIRE COLON WNL, DR. ARIAS THOMASON  AT [facility]    COLONOSCOPY N/A 10/07/2008    MULTIPLE LARGE MOUTHED DIVERTICULA IN SIGMOID, RESCOPE IN 5 YRS, DR. ARIAS THOMASON AT [facility]    COLONOSCOPY N/A 06/21/2011    DIVERTICULOSIS, MILD COLONIC SPASM, RESCOPE IN 5 YRS, DR. ARIAS THOMASON AT [facility]    COLONOSCOPY N/A 10/10/2019    5 MM HYPERPLASTIC POLYP IN CECUM, 5 MM HYPERPLASTIC POLYP IN ASCENDING, 5 MM HYPERPLASTIC POLYP IN TRANSVERSE, 5 MM HYPERPLASTIC POLYP IN DESCENDING, RESCOPE IN 3 YRS, DR. LUCY HILL AT [facility]    COLONOSCOPY N/A 11/29/2022    Procedure: COLONOSCOPY to cecum and TI;  with biopsies, cold bx polyps,;  Surgeon: Kathy Araya MD;  Location: [facility];  Service: Gastroenterology;  Laterality: N/A;  pre:  Diarrhea, left lower quadrant pain, abnormal CT scan of the sigmoid colon  post:  polyps, diverticulosis, abnormal colon mucosa, hemorrhoids    COSMETIC SURGERY N/A 1995    ABDOMINOPLASTY AND LIPOSUCTION, DR. MAUREEN WATERHOUSE    COSMETIC SURGERY Bilateral 1997    ARM REDUCTION, BRACHIOPLASTY, DR. MAUREEN WATERHOUSE    COSMETIC SURGERY Bilateral     UPPER EYELID BLEPHAROPLASTY    COSMETIC SURGERY N/A 10/13/2000    LIPECTOMY OF ABDOMINAL WALL, DR. MAUREEN [facility]HOU[facility]    CYSTOSCOPY N/A 03/19/2014    DR. JERMAINE MURRIETA AT [facility]    DIAGNOSTIC LAPAROSCOPY N/A 06/04/2010    DEBRIDEMENT OF ABDOMINAL WALL, DR. SILVERIO AT [facility]    DILATATION AND CURETTAGE N/A 02/2003    ENDOSCOPY N/A 01/13/2006    REFLUX ESOPHAGITIS, OTHERWISE WNL, DR. JOSE J WELLS AT [facility]    ENDOSCOPY N/A 11/29/2022    Procedure: ESOPHAGOGASTRODUODENOSCOPY iwth biopsies;  Surgeon: Kathy Araya MD;  Location:  [facility];  Service: Gastroenterology;  Laterality: N/A;  Pre:  epigastric pain  post:  gastritis, esophagitis,     GASTRIC BANDING REMOVAL N/A 07/2011    GASTRIC PORT CHANGE N/A     DR. SILVERIO AT [facility]    HERNIA REPAIR N/A 03/12/2002    VENTRAL HERNIA REPAIR, DR. KAITLYNN CHAVEZ AT [facility]    LAPAROSCOPIC GASTRIC BANDING N/A 02/21/2017     DR. SILVERIO AT [facility]    LAPAROSCOPIC LYSIS OF ADHESIONS N/A 11/05/2018    DR. LORI HILL AT [place]    SHOULDER ARTHROSCOPY W/ LABRAL REPAIR Left     AND DEBRIDEMENT OF ROTATOR CUFF    TENSION FREE VAGINAL TAPING WITH MINI ARC SLING N/A 03/19/2004    DR. JERMAINE MURRIETA AT [facility]    TUBAL ABDOMINAL LIGATION Bilateral 1982    UPPER GASTROINTESTINAL ENDOSCOPY  2022        Current Outpatient Medications on File Prior to Visit   Medication Sig Dispense Refill    allopurinol (ZYLOPRIM) 300 MG tablet Take 1 tablet by mouth Daily.      atenolol (TENORMIN) 50 MG tablet TAKE ONE TABLET BY MOUTH DAILY 30 tablet 0    atorvastatin (LIPITOR) 20 MG tablet Take 1 tablet by mouth Every Night. 90 tablet 2    Cholecalciferol (VITAMIN D3) 5000 units chewable tablet Chew 5,000 Units Daily.      empagliflozin (Jardiance) 10 MG tablet tablet Take 1 tablet by mouth Daily.      furosemide (LASIX) 40 MG tablet Take 1 tablet by mouth Daily.      glucose blood test strip Use to test glucose once daily. Dx DM 2 E11.9 100 each 11    glucose blood test strip       imipramine (TOFRANIL-PM) 75 MG capsule Take 1 capsule by mouth Every Night. 90 capsule 2    ketoconazole (NIZORAL) 2 % shampoo       levothyroxine (SYNTHROID, LEVOTHROID) 137 MCG tablet Take 1 tablet by mouth Daily. 90 tablet 2    Nutritional Supplements (Silica) 12.5 MG capsule       omeprazole (priLOSEC) 40 MG capsule Take 1 capsule by mouth Daily. 90 capsule 3    Peppermint Oil (IBgard) 90 MG capsule controlled-release       Probiotic Product capsule       Progesterone (PROMETRIUM) 100 MG capsule Take 1 capsule by mouth Daily.      Semaglutide, 2 MG/DOSE, (OZEMPIC) 8 MG/3ML solution pen-injector Inject 2 mg under the skin into the appropriate area as directed 1 (One) Time Per Week. 3 mL 5    valsartan (DIOVAN) 160 MG tablet Take 2 tablets by mouth Daily. 90 tablet 2    [DISCONTINUED] estradiol (CLIMARA) 0.075 MG/24HR patch       [DISCONTINUED] acetaminophen (TYLENOL) 325  "MG tablet Take 1 tablet by mouth Every 6 (Six) Hours As Needed.      [DISCONTINUED] bismuth subsalicylate (Pepto-Bismol) 262 MG chewable tablet Chew 2 tablets 2 (Two) Times a Day. (Patient not taking: Reported on 3/18/2024) 120 tablet 3    [DISCONTINUED] Calcium-Vitamin D-Vitamin K 500-500-40 MG-UNT-MCG chewable tablet Chew.      [DISCONTINUED] empagliflozin (Jardiance) 10 MG tablet tablet Take 1 tablet by mouth Daily.      [DISCONTINUED] finasteride (PROSCAR) 5 MG tablet       [DISCONTINUED] metroNIDAZOLE (METROGEL) 0.75 % gel metronidazole 0.75 % topical gel       No current facility-administered medications on file prior to visit.        ALLERGIES:    Allergies   Allergen Reactions    Other Dermatitis, Itching and Swelling     \"surgical glue\"    Wound Dressing Adhesive Itching    Duloxetine Other (See Comments)     Other reaction(s): Decreased libido  DECREASED LIBIDO    Erythromycin Nausea Only    Formaldehyde Itching    Minocycline Swelling     GENERALIZED    Nsaids Other (See Comments)     Avoids due to CKD     Tea Tree Oil Other (See Comments)     Found during allergy test    Venlafaxine Other (See Comments)     Other reaction(s): Decreased libido  DECREASED LIBIDO.        Social History     Socioeconomic History    Marital status:      Spouse name: Lincoln    Number of children: 2   Tobacco Use    Smoking status: Former     Current packs/day: 0.00     Average packs/day: 0.5 packs/day for 20.0 years (10.0 ttl pk-yrs)     Types: Cigarettes     Start date: 1972     Quit date: 1992     Years since quittin.2    Smokeless tobacco: Never   Substance and Sexual Activity    Alcohol use: Yes     Comment: Do not drink daily or weekly    Drug use: No    Sexual activity: Yes     Partners: Male     Birth control/protection: Post-menopausal, Tubal ligation        Family History   Problem Relation Age of Onset    Heart murmur Mother     Hyperlipidemia Mother     Heart disease Mother     Hypertension Mother " "    Alcohol abuse Father     Liver disease Father     Cirrhosis Father     COPD Father     Liver cancer Father     Hypertension Sister     Heart disease Sister     Heart disease Brother     Basal cell carcinoma Brother     Hypertension Brother     Diabetes Brother     Gout Brother     Prostate cancer Brother     Obesity Brother     Valvular heart disease Daughter     No Known Problems Son     Cancer Maternal Grandmother     Suicidality Paternal Grandfather           Objective     Vitals:    03/18/24 0954   BP: 122/70   Pulse: 95   Resp: 16   Temp: 98.2 °F (36.8 °C)   TempSrc: Temporal   SpO2: 95%   Weight: 73.3 kg (161 lb 11.2 oz)   Height: 147.3 cm (57.99\")   PainSc: 0-No pain           3/18/2024     9:53 AM   Current Status   ECOG score 0       EXAM:                 GENERAL:  Well-developed, Patient  in no acute distress. ALTAMIRANO FACE QUESTIONS CUSHING'S  SKIN:  Warm, dry ,NO purpura ,no rash.YELLOW NAILS ALOPECIA  HEENT:  Pupils were equal and reactive to light and accomodation, conjunctivae noninjected,  normal visual acuity.   NECK:  Supple with good range of motion; no thyromegaly , no JVD or bruits,.No carotid artery pain, no carotid abnormal pulsation   LYMPHATICS:  No cervical, NO supraclavicular, NO axillary, NO inguinal adenopathies.  CARDIAC   normal rate , regular rhythm, without murmur,NO rubs NO S3 NO S4   LUNGS: normal breath sounds bilateral, no wheezing, NO rhonchi, NO crackles ,NO rubs.  VASCULAR VENOUS: no cyanosis, NO collateral circulation, NO varicosities, NO edema, NO palpable cords, NO pain,NO erythema, NO pigmentation of the skin.  ABDOMEN:  Soft, NO pain,no hepatomegaly, no splenomegaly,no masses, no ascites, no collateral circulation,no distention.  EXTREMITIES  AND SPINE:  No clubbing, no cyanosis ,no deformities , no pain .No kyphosis,  no pain in spine, no pain in ribs , no pain in pelvic bone.  NEUROLOGICAL:  Patient was awake, alert, oriented to time, person and place.    Hematology WBC "   Date Value Ref Range Status   03/18/2024 10.93 (H) 3.40 - 10.80 10*3/mm3 Final   10/25/2023 16.43 (H) 3.40 - 10.80 10*3/mm3 Final   03/31/2023 10.25 4.5 - 11.0 10*3/uL Final     RBC   Date Value Ref Range Status   03/18/2024 4.55 3.77 - 5.28 10*6/mm3 Final   10/25/2023 5.04 3.77 - 5.28 10*6/mm3 Final   03/31/2023 4.49 4.0 - 5.2 10*6/uL Final     Hemoglobin   Date Value Ref Range Status   03/18/2024 14.4 12.0 - 15.9 g/dL Final   03/31/2023 13.6 12.0 - 16.0 g/dL Final     Hematocrit   Date Value Ref Range Status   03/18/2024 39.9 34.0 - 46.6 % Final   03/31/2023 41.8 36.0 - 46.0 % Final     Platelets   Date Value Ref Range Status   03/18/2024 376 140 - 450 10*3/mm3 Final   03/31/2023 464 (H) 140 - 440 10*3/uL Final       CBC:    WBC   Date Value Ref Range Status   03/18/2024 10.93 (H) 3.40 - 10.80 10*3/mm3 Final   10/25/2023 16.43 (H) 3.40 - 10.80 10*3/mm3 Final   03/31/2023 10.25 4.5 - 11.0 10*3/uL Final     RBC   Date Value Ref Range Status   03/18/2024 4.55 3.77 - 5.28 10*6/mm3 Final   10/25/2023 5.04 3.77 - 5.28 10*6/mm3 Final   03/31/2023 4.49 4.0 - 5.2 10*6/uL Final     Hemoglobin   Date Value Ref Range Status   03/18/2024 14.4 12.0 - 15.9 g/dL Final   03/31/2023 13.6 12.0 - 16.0 g/dL Final     Hematocrit   Date Value Ref Range Status   03/18/2024 39.9 34.0 - 46.6 % Final   03/31/2023 41.8 36.0 - 46.0 % Final     MCV   Date Value Ref Range Status   03/18/2024 87.7 79.0 - 97.0 fL Final   03/31/2023 93.1 80.0 - 100.0 fL Final     MCH   Date Value Ref Range Status   03/18/2024 31.6 26.6 - 33.0 pg Final   03/31/2023 30.3 26.0 - 34.0 pg Final     MCHC   Date Value Ref Range Status   03/18/2024 36.1 (H) 31.5 - 35.7 g/dL Final   03/31/2023 32.5 31.0 - 37.0 g/dL Final     RDW   Date Value Ref Range Status   03/18/2024 15.2 12.3 - 15.4 % Final   03/31/2023 14.4 12.0 - 16.8 % Final     RDW-SD   Date Value Ref Range Status   03/18/2024 46.4 37.0 - 54.0 fl Final     MPV   Date Value Ref Range Status   03/18/2024 9.2 6.0 -  12.0 fL Final   03/31/2023 9.5 8.4 - 12.4 fL Final     Platelets   Date Value Ref Range Status   03/18/2024 376 140 - 450 10*3/mm3 Final   03/31/2023 464 (H) 140 - 440 10*3/uL Final     Neutrophil Rel %   Date Value Ref Range Status   03/31/2023 51.7 45 - 80 % Final     Neutrophil %   Date Value Ref Range Status   03/18/2024 53.1 42.7 - 76.0 % Final     Lymphocyte Rel %   Date Value Ref Range Status   03/31/2023 33.1 15 - 50 % Final     Lymphocyte %   Date Value Ref Range Status   03/18/2024 27.8 19.6 - 45.3 % Final     Monocyte Rel %   Date Value Ref Range Status   03/31/2023 8.8 0 - 15 % Final     Monocyte %   Date Value Ref Range Status   03/18/2024 7.5 5.0 - 12.0 % Final     Eosinophil %   Date Value Ref Range Status   03/18/2024 2.6 0.3 - 6.2 % Final   03/31/2023 4.1 0 - 7 % Final     Basophil Rel %   Date Value Ref Range Status   03/31/2023 0.8 0 - 2 % Final     Basophil %   Date Value Ref Range Status   03/18/2024 1.9 (H) 0.0 - 1.5 % Final     Immature Grans %   Date Value Ref Range Status   03/18/2024 7.1 (H) 0.0 - 0.5 % Final   03/31/2023 1.5 (H) 0.0 - 1.0 % Final     Neutrophils Absolute   Date Value Ref Range Status   03/31/2023 5.31 2.0 - 8.8 10*3/uL Final     Neutrophils, Absolute   Date Value Ref Range Status   03/18/2024 5.80 1.70 - 7.00 10*3/mm3 Final     Lymphocytes Absolute   Date Value Ref Range Status   03/31/2023 3.39 0.7 - 5.5 10*3/uL Final     Lymphocytes, Absolute   Date Value Ref Range Status   03/18/2024 3.04 0.70 - 3.10 10*3/mm3 Final     Monocytes Absolute   Date Value Ref Range Status   03/31/2023 0.90 0.0 - 1.7 10*3/uL Final     Monocytes, Absolute   Date Value Ref Range Status   03/18/2024 0.82 0.10 - 0.90 10*3/mm3 Final     Eosinophils Absolute   Date Value Ref Range Status   03/31/2023 0.42 0.0 - 0.8 10*3/uL Final     Eosinophils, Absolute   Date Value Ref Range Status   03/18/2024 0.28 0.00 - 0.40 10*3/mm3 Final     Basophils Absolute   Date Value Ref Range Status   03/31/2023 0.08  0.0 - 0.2 10*3/uL Final     Basophils, Absolute   Date Value Ref Range Status   03/18/2024 0.21 (H) 0.00 - 0.20 10*3/mm3 Final     Immature Grans, Absolute   Date Value Ref Range Status   03/18/2024 0.78 (H) 0.00 - 0.05 10*3/mm3 Final   03/31/2023 0.15 (H) 0.00 - 0.10 10*3/uL Final     nRBC   Date Value Ref Range Status   03/18/2024 1.2 (H) 0.0 - 0.2 /100 WBC Final        CMP:    Glucose   Date Value Ref Range Status   12/12/2023 142 (H) 65 - 99 mg/dL Final     BUN   Date Value Ref Range Status   12/12/2023 19 8 - 23 mg/dL Final   11/10/2022 40 (H) 10 - 20 mg/dL Final     Creatinine   Date Value Ref Range Status   12/12/2023 1.50 (H) 0.57 - 1.00 mg/dL Final   11/10/2022 2.11 (H) 0.55 - 1.02 mg/dL Final   01/10/2022 104.0 15 - 500 mg/dL Final     Sodium   Date Value Ref Range Status   12/12/2023 139 136 - 145 mmol/L Final   11/10/2022 138 136 - 145 mmol/L Final     Potassium   Date Value Ref Range Status   12/12/2023 3.9 3.5 - 5.2 mmol/L Final   11/10/2022 5.4 (H) 3.5 - 5.1 mmol/L Final     Chloride   Date Value Ref Range Status   12/12/2023 103 98 - 107 mmol/L Final   11/10/2022 108 (H) 98 - 107 mmol/L Final     CO2   Date Value Ref Range Status   12/12/2023 27.2 22.0 - 29.0 mmol/L Final     Total CO2   Date Value Ref Range Status   11/10/2022 18 (L) 22 - 29 mmol/L Final   04/18/2022 27 22 - 29 mmol/L Final     Calcium   Date Value Ref Range Status   12/12/2023 9.0 8.6 - 10.5 mg/dL Final   11/10/2022 9.8 8.4 - 10.2 mg/dL Final     Total Protein   Date Value Ref Range Status   12/12/2023 6.3 6.0 - 8.5 g/dL Final   09/09/2022 6.8 6.2 - 8.0 g/dL Final     Albumin   Date Value Ref Range Status   12/12/2023 3.7 3.5 - 5.2 g/dL Final   09/09/2022 4.0 3.2 - 4.6 g/dL Final     ALT (SGPT)   Date Value Ref Range Status   12/12/2023 12 1 - 33 U/L Final   09/09/2022 6 (L) 10 - 35 U/L Final     AST (SGOT)   Date Value Ref Range Status   12/12/2023 13 1 - 32 U/L Final   09/09/2022 20 10 - 35 U/L Final     Alkaline Phosphatase    Date Value Ref Range Status   12/12/2023 141 (H) 39 - 117 U/L Final   09/09/2022 136 40 - 150 U/L Final     Total Bilirubin   Date Value Ref Range Status   12/12/2023 0.5 0.0 - 1.2 mg/dL Final   09/09/2022 0.3 0.2 - 1.2 mg/dL Final     eGFR  Am   Date Value Ref Range Status   11/10/2022 28 (L) >60 /1.73 m2 Final     Comment:     (note)  Results do not take into account body mass.  Valid for patients 18  to 70 years of age.     Globulin   Date Value Ref Range Status   12/12/2023 2.6 gm/dL Final   09/09/2022 2.8 1.5 - 4.5 g/dL Final     A/G Ratio   Date Value Ref Range Status   12/12/2023 1.4 g/dL Final     BUN/Creatinine Ratio   Date Value Ref Range Status   12/12/2023 12.7 7.0 - 25.0 Final   11/10/2022 18.9 RATIO Final     Anion Gap   Date Value Ref Range Status   12/12/2023 8.8 5.0 - 15.0 mmol/L Final   11/10/2022 12 5 - 13 (arb'U) Final     Comment:     (note)  Calculation- Na - (Cl + CO2)                 Assessment & Plan     Diagnoses and all orders for this visit:    1. Leukemoid reaction (Primary)  -     Comprehensive Metabolic Panel  -     Cortisol - AM; Future  -     ACTH; Future  -     Sedimentation Rate  -     C-reactive Protein; Future  -     Lactate Dehydrogenase; Future  -     JAK2 Mutation Analysis, Qual  -     Urinalysis With Microscopic - Urine, Clean Catch  -     TSH  -     MPL Mutation Analysis  -     CT Chest Without Contrast Diagnostic; Future  -     CT Abdomen Pelvis Without Contrast; Future    2. Abnormal results of thyroid function studies  -     TSH    3. Chronic myeloproliferative disease  -     MPL Mutation Analysis            On 11/16/2023 I have reviewed this patient's peripheral blood. The following is the report of this.    1. White blood cells. There is minimal increase in the total amount of white blood cells, there is shift of maturation. Some tendency to hypersegmentation of the poles. There are no blasts or plasma cells in circulation. There is no toxic granulation.  2.  Red cells. Red cells are normocytic, normochromic with no inclusions no fragments, no spherocytes. No cigar cells. No rouleaux formation.   3. Platelets. Normal platelet count and morphology. No platelet clumps.    In summary this patient has had some leukocytosis and thrombocytosis at least on 2 blood measurements. Today her thrombocytosis has disappeared, the leukocytosis has persisted. Obviously the first differential diagnosis will be that this patient has an infection somewhere. An infection that she has had in the soft tissues of the right eye will not count for this response. There is no other inflammatory or infectious process in this patient as far as I can tell on clinical grounds. This raises the question if obesity is part of the problem and maybe is the problem.    The other phenomenon that could be happening whenever thrombocytosis and leukocytosis are altogether and now that the patient also has an increase in the hemoglobin will be a myeloproliferative disorder. For this reason we will proceed with at least a BCR/ABL in peripheral blood.     I have not modified any of the medicines that she is taking neither have provided any new medicines. She went back to the lab to proceed with laboratory testing that includes CMP, sed rate, C-reactive protein, B12, folic acid level, ferritin, iron profile, LDH and a BCR/ABL. In 4 weeks upon return she will have a repeat CBC.     I discussed all of these facts with the patient. She was grateful about the conversation and the support provided.     On 12/12/2023 the patient was further reviewed. Since the previous visit consultation the patient has had laboratory testing that included a normal sedimentation rate, normal C-reactive protein, normal LDH in order to evaluate her leukocytosis. She has not had any fever or infectious process. Also we proceeded with BCR/ABL in peripheral blood by PCR. This analysis was negative to diagnose chronic myeloid leukemia.     The  only number that called my attention was her creatinine elevation of 1.8. she is known to have chronic medical kidney disease with thin kidney cortexes bilaterally and a calculus in the left kidney, staghorn with no infection and no hydronephrosis. The right kidney is bigger than the left kidney in radiological assessment posted above.     From my point of view her white count today is normal, hemoglobin is normal, the platelet count is normal and I have nothing else to report in regard to the leukocytosis in this patient. Therefore I will not need to give her any medication, modify any medicine that she is taking with the exception for her to discuss with her nephrologist the use or not of Lasix under the present circumstances.     Besides this I have not offered the patient any other intervention because I do not believe from my field there is anything necessary. I answered the question of the leukocytosis, I do not know what happened with this in this regard but at least she has no inflammatory marker alteration to make any other definitive diagnosis and she has nothing to suggest chronic myeloid leukemia.     I will not need to bring her back for review in the future. A letter will be sent to her primary nephrologist in this regard. Discussed with the patient in detail.    On 03/18/2024, it caught my attention, the patient's alopecia, the yellow nails and the moon-like face that suggests to me that this patient could have some other endocrinological condition, for example Cushing's syndrome. The patient is not taking any prednisone. She is not taking any supplements of any nature and I feel the obligation for this patient to be tested for cortisol-AM and ACTH.     Today, the patient's white count, hemoglobin and platelets are normal here in the office. The patient has no splenomegaly and she has no obvious recent infections. The scan that we have from her abdomen is 2022 and I have the belief that the patient  needs to have complete assessment in regard to this variable degree of leukocytosis, increased hemoglobin and increased platelet count. Typically, this is not the behavior of myeloproliferative disorder when 1 week the numbers are normal and the next week the numbers are very abnormal. This is very peculiar in this patient. Again, she is not doing anything different to her body, any new medicines and nothing different clinically besides what I just mentioned. Therefore, we will obtain a myeloproliferative disorder testing in peripheral blood and also we will perform a JAK2 mutation and we will proceed with radiological assessment of her abdomen to reassess spleen size. I also am concerned of the fact that she also has had staghorn calculus in the right kidney. This is not symptomatic but I want to review the status of this to be sure there are no other cofactors that are modifying her laboratory parameters.     I have not prescribed any medicines for her and neither have I modified any of the medicines that she is doing. I will review her back in a few days after she has all this testing, repeat her CBC and then go from there. I would not be surprised if in the long run the patient needs to have bone marrow testing.     I discussed all these facts with the patient today. As soon as laboratory testing starts to become available, any issues that needs to be discussed with her right away will be called to her by me or my nurse.     I have placed a copy of the report by Dr. Lopez available to me by the patient that will be scanned into the medical record.

## 2024-03-19 DIAGNOSIS — E11.22 TYPE 2 DIABETES MELLITUS WITH STAGE 4 CHRONIC KIDNEY DISEASE, WITHOUT LONG-TERM CURRENT USE OF INSULIN: ICD-10-CM

## 2024-03-19 DIAGNOSIS — N18.4 TYPE 2 DIABETES MELLITUS WITH STAGE 4 CHRONIC KIDNEY DISEASE, WITHOUT LONG-TERM CURRENT USE OF INSULIN: ICD-10-CM

## 2024-03-19 LAB
ALBUMIN SERPL-MCNC: 3.6 G/DL (ref 3.5–5.2)
ALBUMIN/GLOB SERPL: 1.8 G/DL
ALP SERPL-CCNC: 147 U/L (ref 39–117)
ALT SERPL-CCNC: 15 U/L (ref 1–33)
AST SERPL-CCNC: 12 U/L (ref 1–32)
BILIRUB SERPL-MCNC: 0.5 MG/DL (ref 0–1.2)
BUN SERPL-MCNC: 17 MG/DL (ref 8–23)
BUN/CREAT SERPL: 12.4 (ref 7–25)
CALCIUM SERPL-MCNC: 8.6 MG/DL (ref 8.6–10.5)
CHLORIDE SERPL-SCNC: 100 MMOL/L (ref 98–107)
CHOLEST SERPL-MCNC: 118 MG/DL (ref 0–200)
CO2 SERPL-SCNC: 26 MMOL/L (ref 22–29)
CREAT SERPL-MCNC: 1.37 MG/DL (ref 0.57–1)
EGFRCR SERPLBLD CKD-EPI 2021: 41.4 ML/MIN/1.73
GLOBULIN SER CALC-MCNC: 2 GM/DL
GLUCOSE SERPL-MCNC: 136 MG/DL (ref 65–99)
HBA1C MFR BLD: 7.3 % (ref 4.8–5.6)
HDLC SERPL-MCNC: 31 MG/DL (ref 40–60)
IMP & REVIEW OF LAB RESULTS: NORMAL
LDLC SERPL CALC-MCNC: 51 MG/DL (ref 0–100)
POTASSIUM SERPL-SCNC: 3.1 MMOL/L (ref 3.5–5.2)
PROT SERPL-MCNC: 5.6 G/DL (ref 6–8.5)
REPORT: NORMAL
SODIUM SERPL-SCNC: 140 MMOL/L (ref 136–145)
T4 FREE SERPL-MCNC: 1.74 NG/DL (ref 0.93–1.7)
TRIGL SERPL-MCNC: 222 MG/DL (ref 0–150)
TSH SERPL DL<=0.005 MIU/L-ACNC: 0.11 UIU/ML (ref 0.27–4.2)
VLDLC SERPL CALC-MCNC: 36 MG/DL (ref 5–40)

## 2024-03-19 NOTE — TELEPHONE ENCOUNTER
Called patient and relayed message. States her nephrologist placed her on veltassa to lower her potassium and she is taking it every other day. Patient stated she would reach out to her nephrologist to find out how they would like her to take it now that she is low. Leah Landa RN

## 2024-03-20 DIAGNOSIS — E03.9 HYPOTHYROIDISM, UNSPECIFIED TYPE: ICD-10-CM

## 2024-03-20 RX ORDER — LEVOTHYROXINE SODIUM 0.12 MG/1
125 TABLET ORAL DAILY
Qty: 90 TABLET | Refills: 1 | Status: SHIPPED | OUTPATIENT
Start: 2024-03-20

## 2024-04-01 ENCOUNTER — HOSPITAL ENCOUNTER (OUTPATIENT)
Facility: HOSPITAL | Age: 74
Discharge: HOME | End: 2024-04-01
Admitting: INTERNAL MEDICINE
Payer: MEDICARE

## 2024-04-01 DIAGNOSIS — D72.823 LEUKEMOID REACTION: ICD-10-CM

## 2024-04-01 PROCEDURE — 74176 CT ABD & PELVIS W/O CONTRAST: CPT

## 2024-04-01 PROCEDURE — 71250 CT THORAX DX C-: CPT

## 2024-04-05 LAB
REF LAB TEST METHOD: NORMAL
REF LAB TEST METHOD: NORMAL

## 2024-04-11 ENCOUNTER — LAB (OUTPATIENT)
Dept: LAB | Facility: HOSPITAL | Age: 74
End: 2024-04-11
Payer: MEDICARE

## 2024-04-11 ENCOUNTER — OFFICE VISIT (OUTPATIENT)
Dept: ONCOLOGY | Facility: CLINIC | Age: 74
End: 2024-04-11
Payer: MEDICARE

## 2024-04-11 VITALS
OXYGEN SATURATION: 94 % | DIASTOLIC BLOOD PRESSURE: 82 MMHG | WEIGHT: 160.5 LBS | BODY MASS INDEX: 33.69 KG/M2 | TEMPERATURE: 97 F | HEART RATE: 106 BPM | SYSTOLIC BLOOD PRESSURE: 153 MMHG | RESPIRATION RATE: 16 BRPM | HEIGHT: 58 IN

## 2024-04-11 DIAGNOSIS — D72.823 LEUKEMOID REACTION: ICD-10-CM

## 2024-04-11 DIAGNOSIS — D72.823 LEUKEMOID REACTION: Primary | ICD-10-CM

## 2024-04-11 LAB
BASOPHILS # BLD AUTO: 0.09 10*3/MM3 (ref 0–0.2)
BASOPHILS NFR BLD AUTO: 0.7 % (ref 0–1.5)
CORTIS SERPL-MCNC: 8.4 MCG/DL
CRP SERPL-MCNC: 0.48 MG/DL (ref 0–0.5)
DEPRECATED RDW RBC AUTO: 51.5 FL (ref 37–54)
EOSINOPHIL # BLD AUTO: 0.15 10*3/MM3 (ref 0–0.4)
EOSINOPHIL NFR BLD AUTO: 1.1 % (ref 0.3–6.2)
ERYTHROCYTE [DISTWIDTH] IN BLOOD BY AUTOMATED COUNT: 15.6 % (ref 12.3–15.4)
HCT VFR BLD AUTO: 44.6 % (ref 34–46.6)
HGB BLD-MCNC: 15 G/DL (ref 12–15.9)
IMM GRANULOCYTES # BLD AUTO: 0.22 10*3/MM3 (ref 0–0.05)
IMM GRANULOCYTES NFR BLD AUTO: 1.7 % (ref 0–0.5)
LYMPHOCYTES # BLD AUTO: 3.93 10*3/MM3 (ref 0.7–3.1)
LYMPHOCYTES NFR BLD AUTO: 30 % (ref 19.6–45.3)
MCH RBC QN AUTO: 30.7 PG (ref 26.6–33)
MCHC RBC AUTO-ENTMCNC: 33.6 G/DL (ref 31.5–35.7)
MCV RBC AUTO: 91.2 FL (ref 79–97)
MONOCYTES # BLD AUTO: 0.89 10*3/MM3 (ref 0.1–0.9)
MONOCYTES NFR BLD AUTO: 6.8 % (ref 5–12)
NEUTROPHILS NFR BLD AUTO: 59.7 % (ref 42.7–76)
NEUTROPHILS NFR BLD AUTO: 7.81 10*3/MM3 (ref 1.7–7)
NRBC BLD AUTO-RTO: 0 /100 WBC (ref 0–0.2)
PLATELET # BLD AUTO: 412 10*3/MM3 (ref 140–450)
PMV BLD AUTO: 8.7 FL (ref 6–12)
RBC # BLD AUTO: 4.89 10*6/MM3 (ref 3.77–5.28)
WBC NRBC COR # BLD AUTO: 13.09 10*3/MM3 (ref 3.4–10.8)

## 2024-04-11 PROCEDURE — 36415 COLL VENOUS BLD VENIPUNCTURE: CPT

## 2024-04-11 PROCEDURE — 82533 TOTAL CORTISOL: CPT | Performed by: INTERNAL MEDICINE

## 2024-04-11 PROCEDURE — 85025 COMPLETE CBC W/AUTO DIFF WBC: CPT

## 2024-04-11 PROCEDURE — 86140 C-REACTIVE PROTEIN: CPT | Performed by: INTERNAL MEDICINE

## 2024-04-11 RX ORDER — ATENOLOL 50 MG/1
50 TABLET ORAL DAILY
Qty: 30 TABLET | Refills: 0 | Status: SHIPPED | OUTPATIENT
Start: 2024-04-11

## 2024-04-11 NOTE — TELEPHONE ENCOUNTER
Caller: KatySravanthi    Relationship: Self    Best call back number: [phone] (Home)     Requested Prescriptions:   Requested Prescriptions     Pending Prescriptions Disp Refills    atenolol (TENORMIN) 50 MG tablet 30 tablet 0     Sig: Take 1 tablet by mouth Daily.        Pharmacy where request should be sent: [facility] [id number] [facility] [address] AT [facility] [phone] [facility] [phone] FX     PATIENT STATES PHONE NUMBER IS CORRECT BUT ADDRESS IS WRONG FOR THE PHARMACY. PLEASE ADVISE. THANKS.     Last office visit with prescribing clinician: 1/25/2024   Last telemedicine visit with prescribing clinician: Visit date not found   Next office visit with prescribing clinician: 6/4/2024     Additional details provided by patient: PATIENT IS GOOD FOR SHE THINKS FIVE DAYS BUT MAY RUN OUT OVER THE WEEKEND.     Does the patient have less than a 3 day supply:  [x] Yes  [] No    Would you like a call back once the refill request has been completed: [x] Yes [] No    If the office needs to give you a call back, can they leave a voicemail: [x] Yes [] No    Kyler Pendleton Rep   04/11/24 13:01 EDT       
motrin 600, tylenol 971

## 2024-04-11 NOTE — PROGRESS NOTES
Subjective         REASON FOR FOLLOW UP:  LEUKOCYTOSIS AND THROMBOCYTOSIS, TO RULE OUT MYELOPROLIFERATIVE DISORDER.    HISTORY OF PRESENT ILLNESS: On 11/16/2023 this 73-year-old female has been seen in consultation. Dr. Leon Layton, has referred her because it has been noticeable in her laboratory testing on 2 different occasions lately that she has had leukocytosis and thrombocytosis in absence of any infection. The patient has had only a soft tissue infection in the right eye that has been drained through her eye doctor and medication has been applied. She has not had any fever or chills. No other infection on any level. Her appetite and weight are stable. Her bowel function and urination are normal. No cardiac or respiratory symptomatology. The patient admits that she is more fatigued now than before. She has not had any rash or new joint pain with the exception of recent episode of gout.     On 12/12/2023 the patient returns to the office for follow up. In the interim laboratory assessment has been done to evaluate her leukocytosis and for review today. The patient feels well at this time. She has proper appetite, proper bowel activity, no difficulty with urine volume. If she does not take the Lasix she has volume accumulation and significant swelling. Otherwise no other new problems. She has not fever, no adenopathy, no rashes.     On 03/18/2024, this patient has been reviewed again as per request of Dr. Lopez, the patient's nephrologist, because laboratory testing performed in his office on 02/20/2024 disclosed a white count of 23,000 with a hemoglobin of 8 and a platelet count of 415,000. In the interval of time, the patient has not had any new symptoms of any nature. She feels perfectly normal with proper appetite, normal bowel function, normal urination. No cardiac or respiratory issues. The only problem that she has found recently is that her face is becoming more round and she is losing hair and her  nails are becoming yellow in color. She is not taking any supplements of any kind or any other new medication that she is aware of. She has not had any fever, chills, or infections of any kind. Today in the office we have documented that the patient has a normal white count, normal hemoglobin and normal platelets.        Past Medical History:   Diagnosis Date    Anxiety     Arthritis     Cardiomegaly     MILD    Cataract     Chronic kidney disease     Colon polyps     FOLLOWED BY DR. LUCY HILL    DDD (degenerative disc disease), lumbar     Diabetes mellitus 2021    Disease of thyroid gland     HYPOTHYROIDISM    Diverticulitis of colon     Elevated cholesterol     Excessive sleepiness     Fatty liver     GERD (gastroesophageal reflux disease)     Gout     Hard to intubate     History of cardiomegaly     History of leukocytosis     History of vitamin D deficiency     Hot flashes     HPV in female     Hyperlipidemia     Hypertension     Hypothyroidism     Insomnia     Leiomyoma     Low back pain     Lumbar radiculopathy     Lung disease     Obesity     DELIA (obstructive sleep apnea)     Panic disorder     Plantar fasciitis     Postmenopausal HRT (hormone replacement therapy)     PROGESTERONE AND ESTRADIAL    Pulmonary nodules     RIGHT MIDDLE LOBE    Renal insufficiency     Rosacea     Steatosis of liver     Tachycardia     Type 2 diabetes mellitus 2020        Past Surgical History:   Procedure Laterality Date    BARIATRIC SURGERY  2006    Removed lapband 2010    CHOLECYSTECTOMY N/A 03/12/2002    WITH CHOLANGIOGRAM, DR. LYNNETTE CHAVEZ AT [facility]    COLONOSCOPY N/A 10/04/2016    4 SESSILE TUBULAR ADENOMA POLYPS IN HEPATIC FLEXURE, 1 SESSILE TUBULAR ADENOMA AND 1 HYPERPLASTIC POLYP IN RECTUM, 6 MM SESSILE HYPERPLASTIC POLYP IN SIGMOID, 2 SESSILE HYPERPLASTI POLYPS IN RECTUM, MULTIPLE SMALL AND LARGE DIVERTICULA, RESCOPE IN 3 YRS, DR. LUCY HILL AT [facility]    COLONOSCOPY N/A 08/19/2003    ENTIRE COLON WNL, DR. ARIAS THOMASON  AT [facility]    COLONOSCOPY N/A 10/07/2008    MULTIPLE LARGE MOUTHED DIVERTICULA IN SIGMOID, RESCOPE IN 5 YRS, DR. ARIAS THOMASON AT [facility]    COLONOSCOPY N/A 06/21/2011    DIVERTICULOSIS, MILD COLONIC SPASM, RESCOPE IN 5 YRS, DR. ARIAS THOMASON AT [facility]    COLONOSCOPY N/A 10/10/2019    5 MM HYPERPLASTIC POLYP IN CECUM, 5 MM HYPERPLASTIC POLYP IN ASCENDING, 5 MM HYPERPLASTIC POLYP IN TRANSVERSE, 5 MM HYPERPLASTIC POLYP IN DESCENDING, RESCOPE IN 3 YRS, DR. LUCY HILL AT [facility]    COLONOSCOPY N/A 11/29/2022    Procedure: COLONOSCOPY to cecum and TI;  with biopsies, cold bx polyps,;  Surgeon: Kathy Araya MD;  [facility];  Service: Gastroenterology;  Laterality: N/A;  pre:  Diarrhea, left lower quadrant pain, abnormal CT scan of the sigmoid colon  post:  polyps, diverticulosis, abnormal colon mucosa, hemorrhoids    COSMETIC SURGERY N/A 1995    ABDOMINOPLASTY AND LIPOSUCTION, DR. MAUREEN WATERHOUSE    COSMETIC SURGERY Bilateral 1997    ARM REDUCTION, BRACHIOPLASTY, DR. MAUREEN WATERHOUSE    COSMETIC SURGERY Bilateral     UPPER EYELID BLEPHAROPLASTY    COSMETIC SURGERY N/A 10/13/2000    LIPECTOMY OF ABDOMINAL WALL, DR. MAUREEN [facility]HOUSE    CYSTOSCOPY N/A 03/19/2014    DR. JERMAINE MURRIETA AT [facility]    DIAGNOSTIC LAPAROSCOPY N/A 06/04/2010    DEBRIDEMENT OF ABDOMINAL WALL, DR. SILVERIO AT [facility]    DILATATION AND CURETTAGE N/A 02/2003    ENDOSCOPY N/A 01/13/2006    REFLUX ESOPHAGITIS, OTHERWISE WNL, DR. JOSE J WELLS AT [facility]    ENDOSCOPY N/A 11/29/2022    Procedure: ESOPHAGOGASTRODUODENOSCOPY iwth biopsies;  Surgeon: Kathy Araya MD;  Location:  [facility];  Service: Gastroenterology;  Laterality: N/A;  Pre:  epigastric pain  post:  gastritis, esophagitis,     GASTRIC BANDING REMOVAL N/A 07/2011    GASTRIC PORT CHANGE N/A     DR. SILVERIO AT [facility]    HERNIA REPAIR N/A 03/12/2002    VENTRAL HERNIA REPAIR, DR. KAITLYNN CHAVEZ AT [facility]    LAPAROSCOPIC GASTRIC BANDING N/A 02/21/2017     DR. SILVERIO AT [facility]    LAPAROSCOPIC LYSIS OF ADHESIONS N/A 11/05/2018    DR. LORI HILL AT [place]    SHOULDER ARTHROSCOPY W/ LABRAL REPAIR Left     AND DEBRIDEMENT OF ROTATOR CUFF    TENSION FREE VAGINAL TAPING WITH MINI ARC SLING N/A 03/19/2004    DR. JERMAINE MURRIETA AT [facility]    TUBAL ABDOMINAL LIGATION Bilateral 1982    UPPER GASTROINTESTINAL ENDOSCOPY  2022        Current Outpatient Medications on File Prior to Visit   Medication Sig Dispense Refill    allopurinol (ZYLOPRIM) 300 MG tablet Take 1 tablet by mouth Daily.      atenolol (TENORMIN) 50 MG tablet TAKE ONE TABLET BY MOUTH DAILY 30 tablet 0    atorvastatin (LIPITOR) 20 MG tablet Take 1 tablet by mouth Every Night. 90 tablet 2    Cholecalciferol (VITAMIN D3) 5000 units chewable tablet Chew 5,000 Units Daily.      empagliflozin (Jardiance) 10 MG tablet tablet Take 1 tablet by mouth Daily.      furosemide (LASIX) 40 MG tablet Take 1 tablet by mouth Daily.      glucose blood test strip Use to test glucose once daily. Dx DM 2 E11.9 100 each 11    glucose blood test strip       imipramine (TOFRANIL-PM) 75 MG capsule Take 1 capsule by mouth Every Night. 90 capsule 2    ketoconazole (NIZORAL) 2 % shampoo       levothyroxine (SYNTHROID, LEVOTHROID) 125 MCG tablet Take 1 tablet by mouth Daily. 90 tablet 1    Nutritional Supplements (Silica) 12.5 MG capsule       omeprazole (priLOSEC) 40 MG capsule Take 1 capsule by mouth Daily. 90 capsule 3    Peppermint Oil (IBgard) 90 MG capsule controlled-release       Probiotic Product capsule       Semaglutide, 2 MG/DOSE, (OZEMPIC) 8 MG/3ML solution pen-injector Inject 2 mg under the skin into the appropriate area as directed 1 (One) Time Per Week. 3 mL 5    valsartan (DIOVAN) 160 MG tablet Take 2 tablets by mouth Daily. 90 tablet 2    [DISCONTINUED] Progesterone (PROMETRIUM) 100 MG capsule Take 1 capsule by mouth Daily.       No current facility-administered medications on file prior to visit.        ALLERGIES:   "  Allergies   Allergen Reactions    Other Dermatitis, Itching and Swelling     \"surgical glue\"    Wound Dressing Adhesive Itching    Duloxetine Other (See Comments)     Other reaction(s): Decreased libido  DECREASED LIBIDO    Erythromycin Nausea Only    Formaldehyde Itching    Minocycline Swelling     GENERALIZED    Nsaids Other (See Comments)     Avoids due to CKD     Tea Tree Oil Other (See Comments)     Found during allergy test    Venlafaxine Other (See Comments)     Other reaction(s): Decreased libido  DECREASED LIBIDO.        Social History     Socioeconomic History    Marital status:      Spouse name: Lincoln    Number of children: 2   Tobacco Use    Smoking status: Former     Current packs/day: 0.00     Average packs/day: 0.5 packs/day for 20.0 years (10.0 ttl pk-yrs)     Types: Cigarettes     Start date: 1972     Quit date: 1992     Years since quittin.2    Smokeless tobacco: Never   Substance and Sexual Activity    Alcohol use: Yes     Comment: Do not drink daily or weekly    Drug use: No    Sexual activity: Yes     Partners: Male     Birth control/protection: Post-menopausal, Tubal ligation        Family History   Problem Relation Age of Onset    Heart murmur Mother     Hyperlipidemia Mother     Heart disease Mother     Hypertension Mother     Alcohol abuse Father     Liver disease Father     Cirrhosis Father     COPD Father     Liver cancer Father     Hypertension Sister     Heart disease Sister     Heart disease Brother     Basal cell carcinoma Brother     Hypertension Brother     Diabetes Brother     Gout Brother     Prostate cancer Brother     Obesity Brother     Valvular heart disease Daughter     No Known Problems Son     Cancer Maternal Grandmother     Suicidality Paternal Grandfather           Objective     There were no vitals filed for this visit.          3/18/2024     9:53 AM   Current Status   ECOG score 0       EXAM:               GENERAL:  Well-developed, Patient  in no " acute distress.   SKIN:  Warm, dry ,NO purpura ,no rash.  HEENT:  Pupils were equal and reactive to light and accomodation, conjunctivae noninjected,  normal visual acuity.   NECK:  Supple with good range of motion; no thyromegaly , no JVD or bruits,.No carotid artery pain, no carotid abnormal pulsation   LYMPHATICS:  No cervical, NO supraclavicular, NO axillary, NO inguinal adenopathies.  CARDIAC   normal rate , regular rhythm, without murmur,NO rubs NO S3 NO S4   LUNGS: normal breath sounds bilateral, no wheezing, NO rhonchi, NO crackles ,NO rubs.  VASCULAR VENOUS: no cyanosis, NO collateral circulation, NO varicosities, NO edema, NO palpable cords, NO pain,NO erythema, NO pigmentation of the skin.  ABDOMEN:  Soft, NO pain,no hepatomegaly, no splenomegaly,no masses, no ascites, no collateral circulation,no distention.  EXTREMITIES  AND SPINE:  No clubbing, no cyanosis ,no deformities , no pain .No kyphosis,  no pain in spine, no pain in ribs , no pain in pelvic bone.  NEUROLOGICAL:  Patient was awake, alert, oriented to time, person and place.    Hematology WBC   Date Value Ref Range Status   03/18/2024 10.93 (H) 3.40 - 10.80 10*3/mm3 Final   10/25/2023 16.43 (H) 3.40 - 10.80 10*3/mm3 Final   03/31/2023 10.25 4.5 - 11.0 10*3/uL Final     RBC   Date Value Ref Range Status   03/18/2024 4.55 3.77 - 5.28 10*6/mm3 Final   10/25/2023 5.04 3.77 - 5.28 10*6/mm3 Final   03/31/2023 4.49 4.0 - 5.2 10*6/uL Final     Hemoglobin   Date Value Ref Range Status   03/18/2024 14.4 12.0 - 15.9 g/dL Final   03/31/2023 13.6 12.0 - 16.0 g/dL Final     Hematocrit   Date Value Ref Range Status   03/18/2024 39.9 34.0 - 46.6 % Final   03/31/2023 41.8 36.0 - 46.0 % Final     Platelets   Date Value Ref Range Status   03/18/2024 376 140 - 450 10*3/mm3 Final   03/31/2023 464 (H) 140 - 440 10*3/uL Final       CBC:    WBC   Date Value Ref Range Status   03/18/2024 10.93 (H) 3.40 - 10.80 10*3/mm3 Final   10/25/2023 16.43 (H) 3.40 - 10.80 10*3/mm3  Final   03/31/2023 10.25 4.5 - 11.0 10*3/uL Final     RBC   Date Value Ref Range Status   03/18/2024 4.55 3.77 - 5.28 10*6/mm3 Final   10/25/2023 5.04 3.77 - 5.28 10*6/mm3 Final   03/31/2023 4.49 4.0 - 5.2 10*6/uL Final     Hemoglobin   Date Value Ref Range Status   03/18/2024 14.4 12.0 - 15.9 g/dL Final   03/31/2023 13.6 12.0 - 16.0 g/dL Final     Hematocrit   Date Value Ref Range Status   03/18/2024 39.9 34.0 - 46.6 % Final   03/31/2023 41.8 36.0 - 46.0 % Final     MCV   Date Value Ref Range Status   03/18/2024 87.7 79.0 - 97.0 fL Final   03/31/2023 93.1 80.0 - 100.0 fL Final     MCH   Date Value Ref Range Status   03/18/2024 31.6 26.6 - 33.0 pg Final   03/31/2023 30.3 26.0 - 34.0 pg Final     MCHC   Date Value Ref Range Status   03/18/2024 36.1 (H) 31.5 - 35.7 g/dL Final   03/31/2023 32.5 31.0 - 37.0 g/dL Final     RDW   Date Value Ref Range Status   03/18/2024 15.2 12.3 - 15.4 % Final   03/31/2023 14.4 12.0 - 16.8 % Final     RDW-SD   Date Value Ref Range Status   03/18/2024 46.4 37.0 - 54.0 fl Final     MPV   Date Value Ref Range Status   03/18/2024 9.2 6.0 - 12.0 fL Final   03/31/2023 9.5 8.4 - 12.4 fL Final     Platelets   Date Value Ref Range Status   03/18/2024 376 140 - 450 10*3/mm3 Final   03/31/2023 464 (H) 140 - 440 10*3/uL Final     Neutrophil Rel %   Date Value Ref Range Status   03/31/2023 51.7 45 - 80 % Final     Neutrophil %   Date Value Ref Range Status   03/18/2024 53.1 42.7 - 76.0 % Final     Lymphocyte Rel %   Date Value Ref Range Status   03/31/2023 33.1 15 - 50 % Final     Lymphocyte %   Date Value Ref Range Status   03/18/2024 27.8 19.6 - 45.3 % Final     Monocyte Rel %   Date Value Ref Range Status   03/31/2023 8.8 0 - 15 % Final     Monocyte %   Date Value Ref Range Status   03/18/2024 7.5 5.0 - 12.0 % Final     Eosinophil %   Date Value Ref Range Status   03/18/2024 2.6 0.3 - 6.2 % Final   03/31/2023 4.1 0 - 7 % Final     Basophil Rel %   Date Value Ref Range Status   03/31/2023 0.8 0 - 2  % Final     Basophil %   Date Value Ref Range Status   03/18/2024 1.9 (H) 0.0 - 1.5 % Final     Immature Grans %   Date Value Ref Range Status   03/18/2024 7.1 (H) 0.0 - 0.5 % Final   03/31/2023 1.5 (H) 0.0 - 1.0 % Final     Neutrophils Absolute   Date Value Ref Range Status   03/31/2023 5.31 2.0 - 8.8 10*3/uL Final     Neutrophils, Absolute   Date Value Ref Range Status   03/18/2024 5.80 1.70 - 7.00 10*3/mm3 Final     Lymphocytes Absolute   Date Value Ref Range Status   03/31/2023 3.39 0.7 - 5.5 10*3/uL Final     Lymphocytes, Absolute   Date Value Ref Range Status   03/18/2024 3.04 0.70 - 3.10 10*3/mm3 Final     Monocytes Absolute   Date Value Ref Range Status   03/31/2023 0.90 0.0 - 1.7 10*3/uL Final     Monocytes, Absolute   Date Value Ref Range Status   03/18/2024 0.82 0.10 - 0.90 10*3/mm3 Final     Eosinophils Absolute   Date Value Ref Range Status   03/31/2023 0.42 0.0 - 0.8 10*3/uL Final     Eosinophils, Absolute   Date Value Ref Range Status   03/18/2024 0.28 0.00 - 0.40 10*3/mm3 Final     Basophils Absolute   Date Value Ref Range Status   03/31/2023 0.08 0.0 - 0.2 10*3/uL Final     Basophils, Absolute   Date Value Ref Range Status   03/18/2024 0.21 (H) 0.00 - 0.20 10*3/mm3 Final     Immature Grans, Absolute   Date Value Ref Range Status   03/18/2024 0.78 (H) 0.00 - 0.05 10*3/mm3 Final   03/31/2023 0.15 (H) 0.00 - 0.10 10*3/uL Final     nRBC   Date Value Ref Range Status   03/18/2024 1.2 (H) 0.0 - 0.2 /100 WBC Final        CMP:    Glucose   Date Value Ref Range Status   03/19/2024 136 (H) 65 - 99 mg/dL Final   03/18/2024 130 (H) 65 - 99 mg/dL Final     BUN   Date Value Ref Range Status   03/19/2024 17 8 - 23 mg/dL Final   03/18/2024 18 8 - 23 mg/dL Final   11/10/2022 40 (H) 10 - 20 mg/dL Final     Creatinine   Date Value Ref Range Status   03/19/2024 1.37 (H) 0.57 - 1.00 mg/dL Final   03/18/2024 1.32 (H) 0.57 - 1.00 mg/dL Final   11/10/2022 2.11 (H) 0.55 - 1.02 mg/dL Final   01/10/2022 104.0 15 - 500 mg/dL  Final     Sodium   Date Value Ref Range Status   03/19/2024 140 136 - 145 mmol/L Final   03/18/2024 141 136 - 145 mmol/L Final   11/10/2022 138 136 - 145 mmol/L Final     Potassium   Date Value Ref Range Status   03/19/2024 3.1 (L) 3.5 - 5.2 mmol/L Final   03/18/2024 3.0 (L) 3.5 - 5.2 mmol/L Final   11/10/2022 5.4 (H) 3.5 - 5.1 mmol/L Final     Chloride   Date Value Ref Range Status   03/19/2024 100 98 - 107 mmol/L Final   03/18/2024 103 98 - 107 mmol/L Final   11/10/2022 108 (H) 98 - 107 mmol/L Final     CO2   Date Value Ref Range Status   03/18/2024 24.9 22.0 - 29.0 mmol/L Final     Total CO2   Date Value Ref Range Status   03/19/2024 26.0 22.0 - 29.0 mmol/L Final   11/10/2022 18 (L) 22 - 29 mmol/L Final   04/18/2022 27 22 - 29 mmol/L Final     Calcium   Date Value Ref Range Status   03/19/2024 8.6 8.6 - 10.5 mg/dL Final   03/18/2024 8.9 8.6 - 10.5 mg/dL Final   11/10/2022 9.8 8.4 - 10.2 mg/dL Final     Total Protein   Date Value Ref Range Status   03/18/2024 6.1 6.0 - 8.5 g/dL Final   09/09/2022 6.8 6.2 - 8.0 g/dL Final     Albumin   Date Value Ref Range Status   03/19/2024 3.6 3.5 - 5.2 g/dL Final   03/18/2024 3.5 3.5 - 5.2 g/dL Final   09/09/2022 4.0 3.2 - 4.6 g/dL Final     ALT (SGPT)   Date Value Ref Range Status   03/19/2024 15 1 - 33 U/L Final   03/18/2024 8 1 - 33 U/L Final   09/09/2022 6 (L) 10 - 35 U/L Final     AST (SGOT)   Date Value Ref Range Status   03/19/2024 12 1 - 32 U/L Final   03/18/2024 16 1 - 32 U/L Final   09/09/2022 20 10 - 35 U/L Final     Alkaline Phosphatase   Date Value Ref Range Status   03/19/2024 147 (H) 39 - 117 U/L Final   03/18/2024 142 (H) 39 - 117 U/L Final   09/09/2022 136 40 - 150 U/L Final     Total Bilirubin   Date Value Ref Range Status   03/19/2024 0.5 0.0 - 1.2 mg/dL Final   03/18/2024 0.5 0.0 - 1.2 mg/dL Final   09/09/2022 0.3 0.2 - 1.2 mg/dL Final     eGFR  Am   Date Value Ref Range Status   11/10/2022 28 (L) >60 /1.73 m2 Final     Comment:     (note)  Results do  not take into account body mass.  Valid for patients 18  to 70 years of age.     Globulin   Date Value Ref Range Status   03/18/2024 2.6 gm/dL Final   09/09/2022 2.8 1.5 - 4.5 g/dL Final     A/G Ratio   Date Value Ref Range Status   03/18/2024 1.3 g/dL Final     BUN/Creatinine Ratio   Date Value Ref Range Status   03/19/2024 12.4 7.0 - 25.0 Final   03/18/2024 13.6 7.0 - 25.0 Final   11/10/2022 18.9 RATIO Final     Anion Gap   Date Value Ref Range Status   03/18/2024 13.1 5.0 - 15.0 mmol/L Final   11/10/2022 12 5 - 13 (arb'U) Final     Comment:     (note)  Calculation- Na - (Cl + CO2)           Flow Cytometry, Blood (03/18/2024 13:30)  JAK2 Mutation Analysis, Qual (03/18/2024 10:37)  Lactate Dehydrogenase (03/18/2024 10:24)  CT Abdomen Pelvis Without Contrast (04/01/2024 08:24)  CT Chest Without Contrast Diagnostic (04/01/2024 08:24)  TSH (03/18/2024 10:24)  Urinalysis With Microscopic - Urine, Clean Catch (03/18/2024 10:24)  Sedimentation Rate (03/18/2024 10:24)  Comprehensive Metabolic Panel (03/18/2024 10:24)        Assessment & Plan     Diagnoses and all orders for this visit:    1. Leukemoid reaction (Primary)  -     CBC & Differential; Future              On 11/16/2023 I have reviewed this patient's peripheral blood. The following is the report of this.    1. White blood cells. There is minimal increase in the total amount of white blood cells, there is shift of maturation. Some tendency to hypersegmentation of the poles. There are no blasts or plasma cells in circulation. There is no toxic granulation.  2. Red cells. Red cells are normocytic, normochromic with no inclusions no fragments, no spherocytes. No cigar cells. No rouleaux formation.   3. Platelets. Normal platelet count and morphology. No platelet clumps.    In summary this patient has had some leukocytosis and thrombocytosis at least on 2 blood measurements. Today her thrombocytosis has disappeared, the leukocytosis has persisted. Obviously the first  differential diagnosis will be that this patient has an infection somewhere. An infection that she has had in the soft tissues of the right eye will not count for this response. There is no other inflammatory or infectious process in this patient as far as I can tell on clinical grounds. This raises the question if obesity is part of the problem and maybe is the problem.    The other phenomenon that could be happening whenever thrombocytosis and leukocytosis are altogether and now that the patient also has an increase in the hemoglobin will be a myeloproliferative disorder. For this reason we will proceed with at least a BCR/ABL in peripheral blood.     I have not modified any of the medicines that she is taking neither have provided any new medicines. She went back to the lab to proceed with laboratory testing that includes CMP, sed rate, C-reactive protein, B12, folic acid level, ferritin, iron profile, LDH and a BCR/ABL. In 4 weeks upon return she will have a repeat CBC.     I discussed all of these facts with the patient. She was grateful about the conversation and the support provided.     On 12/12/2023 the patient was further reviewed. Since the previous visit consultation the patient has had laboratory testing that included a normal sedimentation rate, normal C-reactive protein, normal LDH in order to evaluate her leukocytosis. She has not had any fever or infectious process. Also we proceeded with BCR/ABL in peripheral blood by PCR. This analysis was negative to diagnose chronic myeloid leukemia.     The only number that called my attention was her creatinine elevation of 1.8. she is known to have chronic medical kidney disease with thin kidney cortexes bilaterally and a calculus in the left kidney, staghorn with no infection and no hydronephrosis. The right kidney is bigger than the left kidney in radiological assessment posted above.     From my point of view her white count today is normal, hemoglobin is  normal, the platelet count is normal and I have nothing else to report in regard to the leukocytosis in this patient. Therefore I will not need to give her any medication, modify any medicine that she is taking with the exception for her to discuss with her nephrologist the use or not of Lasix under the present circumstances.     Besides this I have not offered the patient any other intervention because I do not believe from my field there is anything necessary. I answered the question of the leukocytosis, I do not know what happened with this in this regard but at least she has no inflammatory marker alteration to make any other definitive diagnosis and she has nothing to suggest chronic myeloid leukemia.     I will not need to bring her back for review in the future. A letter will be sent to her primary nephrologist in this regard. Discussed with the patient in detail.    On 03/18/2024, it caught my attention, the patient's alopecia, the yellow nails and the moon-like face that suggests to me that this patient could have some other endocrinological condition, for example Cushing's syndrome. The patient is not taking any prednisone. She is not taking any supplements of any nature and I feel the obligation for this patient to be tested for cortisol-AM and ACTH.     Today, the patient's white count, hemoglobin and platelets are normal here in the office. The patient has no splenomegaly and she has no obvious recent infections. The scan that we have from her abdomen is 2022 and I have the belief that the patient needs to have complete assessment in regard to this variable degree of leukocytosis, increased hemoglobin and increased platelet count. Typically, this is not the behavior of myeloproliferative disorder when 1 week the numbers are normal and the next week the numbers are very abnormal. This is very peculiar in this patient. Again, she is not doing anything different to her body, any new medicines and nothing  different clinically besides what I just mentioned. Therefore, we will obtain a myeloproliferative disorder testing in peripheral blood and also we will perform a JAK2 mutation and we will proceed with radiological assessment of her abdomen to reassess spleen size. I also am concerned of the fact that she also has had staghorn calculus in the right kidney. This is not symptomatic but I want to review the status of this to be sure there are no other cofactors that are modifying her laboratory parameters.     I have not prescribed any medicines for her and neither have I modified any of the medicines that she is doing. I will review her back in a few days after she has all this testing, repeat her CBC and then go from there. I would not be surprised if in the long run the patient needs to have bone marrow testing.     I discussed all these facts with the patient today. As soon as laboratory testing starts to become available, any issues that needs to be discussed with her right away will be called to her by me or my nurse.     I have placed a copy of the report by Dr. Lopez available to me by the patient that will be scanned into the medical record.

## 2024-04-12 ENCOUNTER — TELEPHONE (OUTPATIENT)
Dept: ONCOLOGY | Facility: CLINIC | Age: 74
End: 2024-04-12
Payer: MEDICARE

## 2024-04-12 LAB — ACTH PLAS-MCNC: 40.4 PG/ML (ref 7.2–63.3)

## 2024-04-12 NOTE — TELEPHONE ENCOUNTER
Called patient and relayed message that testing for cushings syndrome was negative. Pt v/u. Leah Landa RN

## 2024-04-29 LAB — REF LAB TEST METHOD: NORMAL

## 2024-05-13 RX ORDER — ATENOLOL 50 MG/1
50 TABLET ORAL DAILY
Qty: 30 TABLET | Refills: 0 | Status: SHIPPED | OUTPATIENT
Start: 2024-05-13

## 2024-05-14 ENCOUNTER — OFFICE VISIT (OUTPATIENT)
Dept: SLEEP MEDICINE | Facility: HOSPITAL | Age: 74
End: 2024-05-14
Payer: MEDICARE

## 2024-05-14 VITALS
DIASTOLIC BLOOD PRESSURE: 66 MMHG | WEIGHT: 157 LBS | HEIGHT: 58 IN | BODY MASS INDEX: 32.95 KG/M2 | HEART RATE: 88 BPM | SYSTOLIC BLOOD PRESSURE: 120 MMHG | OXYGEN SATURATION: 95 %

## 2024-05-14 DIAGNOSIS — G47.33 OBSTRUCTIVE SLEEP APNEA, ADULT: Primary | ICD-10-CM

## 2024-05-14 DIAGNOSIS — E66.9 OBESITY (BMI 30-39.9): ICD-10-CM

## 2024-05-14 PROCEDURE — G0463 HOSPITAL OUTPT CLINIC VISIT: HCPCS

## 2024-05-14 NOTE — PROGRESS NOTES
[facility] SLEEP MEDICINE  [address]  [place] [address] [phone]    PCP: Leon Layton MD    Reason for visit:  Sleep disorders: DELIA    Sravanthi is a 73 y.o.female who was seen in the [facility] today. Annual fu. She feels no different with or without the CPAP. She sleeps from 9pm to 9am. Using nasal pillows, fits well. The pressure feels OK. She mostly sleeps on her sides.  [place]ana Sleepiness Scale is 2. Caffeine 2 per day. Alcohol 0 per week.    Sravanthi  reports that she quit smoking about 32 years ago. Her smoking use included cigarettes. She started smoking about 52 years ago. She has a 10 pack-year smoking history. She has never used smokeless tobacco.    Pertinent Positive Review of Systems of acid reflux, anxiety  Rest of Review of Systems was negative as recorded in Sleep Questionnaire.    Patient  has a past medical history of Anxiety, Arthritis, Cardiomegaly, Cataract, Chronic kidney disease, Colon polyps, DDD (degenerative disc disease), lumbar, Diabetes mellitus (2021), Disease of thyroid gland, Diverticulitis of colon, Elevated cholesterol, Excessive sleepiness, Fatty liver, GERD (gastroesophageal reflux disease), Gout, Hard to intubate, History of cardiomegaly, History of leukocytosis, History of vitamin D deficiency, Hot flashes, HPV in female, Hyperlipidemia, Hypertension, Hypothyroidism, Insomnia, Leiomyoma, Low back pain, Lumbar radiculopathy, Lung disease, Obesity, DELIA (obstructive sleep apnea), Panic disorder, Plantar fasciitis, Postmenopausal HRT (hormone replacement therapy), Pulmonary nodules, Renal insufficiency, Rosacea, Steatosis of liver, Tachycardia, and Type 2 diabetes mellitus (2020).     Current Medications:    Current Outpatient Medications:     allopurinol (ZYLOPRIM) 300 MG tablet, Take 1 tablet by mouth Daily., Disp: , Rfl:     atenolol (TENORMIN) 50 MG tablet, TAKE 1 TABLET BY MOUTH DAILY, Disp: 30 tablet, Rfl: 0    atorvastatin (LIPITOR)  "20 MG tablet, Take 1 tablet by mouth Every Night., Disp: 90 tablet, Rfl: 2    Cholecalciferol (VITAMIN D3) 5000 units chewable tablet, Chew 5,000 Units Daily., Disp: , Rfl:     empagliflozin (Jardiance) 10 MG tablet tablet, Take 1 tablet by mouth Daily., Disp: , Rfl:     furosemide (LASIX) 40 MG tablet, Take 1 tablet by mouth Daily., Disp: , Rfl:     glucose blood test strip, Use to test glucose once daily. Dx DM 2 E11.9, Disp: 100 each, Rfl: 11    glucose blood test strip, , Disp: , Rfl:     imipramine (TOFRANIL-PM) 75 MG capsule, Take 1 capsule by mouth Every Night., Disp: 90 capsule, Rfl: 2    ketoconazole (NIZORAL) 2 % shampoo, , Disp: , Rfl:     levothyroxine (SYNTHROID, LEVOTHROID) 125 MCG tablet, Take 1 tablet by mouth Daily., Disp: 90 tablet, Rfl: 1    Nutritional Supplements (Silica) 12.5 MG capsule, , Disp: , Rfl:     omeprazole (priLOSEC) 40 MG capsule, Take 1 capsule by mouth Daily., Disp: 90 capsule, Rfl: 3    Peppermint Oil (IBgard) 90 MG capsule controlled-release, , Disp: , Rfl:     Probiotic Product capsule, , Disp: , Rfl:     Semaglutide, 2 MG/DOSE, (OZEMPIC) 8 MG/3ML solution pen-injector, Inject 2 mg under the skin into the appropriate area as directed 1 (One) Time Per Week., Disp: 3 mL, Rfl: 5    valsartan (DIOVAN) 160 MG tablet, Take 2 tablets by mouth Daily., Disp: 90 tablet, Rfl: 2   also entered in Sleep Questionnaire         Vital Signs: /66   Pulse 88   Ht 147.3 cm (57.99\")   Wt 71.2 kg (157 lb)   LMP  (LMP Unknown)   SpO2 95%   BMI 32.82 kg/m²     Body mass index is 32.82 kg/m².       Tongue: Large       Dentition: good       Pharynx: Posterior pharyngeal pillars are unable   Mallampatti: IV (only hard palate visible)        General: Alert. Cooperative. Well developed. No acute distress.             Head:  Normocephalic. Symmetrical. Atraumatic.              Nose: No septal deviation. No drainage.          Throat: No oral lesions. No thrush. Moist mucous membranes.    Chest " Wall:  Normal shape. Symmetric expansion with respiration. No tenderness.             Neck:  Trachea midline.           Lungs:  Clear to auscultation bilaterally. No wheezes. No rhonchi. No rales. Respirations regular, even and unlabored.            Heart:  Regular rhythm and normal rate. Normal S1 and S2. No murmur.     Abdomen:  Soft, non-tender and non-distended. Normal bowel sounds. No masses.  Extremities:  Moves all extremities well. No edema.    Psychiatric: Normal mood and affect.    Diagnostic data available to date is as below and was reviewed on current visit:  9/17/20  The patient tolerated the home sleep testing with monitoring time of 622.8 minutes. The data obtained make this a technically adequate study. The apnea hypopneas index(AHI) was 9.5 per sleep hour.  The AHI during supine position was 10.5 per sleep hour. Mean heart rate of 75.4 BPM.  Snoring was noted 26.9% of sleep time. Lowest oxygen saturation during the study was 83%. Saturation below 89% was noted for 3.2 mins.     Lab Results   Component Value Date    IRON 199 (H) 11/16/2023    TIBC 350 11/16/2023    FERRITIN 230.00 (H) 11/16/2023       Most current available usage data reviewed on 05/14/2024:        [employer]: [employer]    Prescription to [facility] for replacement supplies as below:    nasal pillow      Description Replacement    Nasal PILLOWS     x A 7034 Nasal Pillows  every 3 mth   x A 7033 Repl Nasal Pillows  2 per mth    Nasal MASK/CUSHION      A 7034 Nasal Mask/Cushion  every 3 mth    A 7032 Repl Nasal Mask/Cushion  2 per mth    Full Face MASK      A 7030 Full Face Mask  every 3 mth    A 7031 Repl Face Mask  1 per mth      A 4604 Heated Tubing  every 3 mth    A 7037 Standard Tubing  every 3 mth   x A 7035 Headgear  every 3 mth   x A 7046 Repl Humidifier Chamber  every 6 yrs   x A 7038 Disposable Filters  2 per mth   x A 7039 Non-disposable Filter  every 6 mth   x A 7036 Chin Strap  every 6 mth     Orders Placed This Encounter    Procedures    Pulmonary Results Scan          Impression:  1. Obstructive sleep apnea, adult    2. Obesity (BMI 30-39.9)        Plan:  Sravanthi is compliant.  Her sleep apnea is mostly supine related. No sig wt loss thus far.  However if she does lose significant weight we can certainly retest her to see if his CPAP device is still required.  She will continue regular usage for now.    I reiterated the importance of effective treatment of obstructive sleep apnea with PAP machine.  Cardiovascular health risks of untreated sleep apnea were again reviewed.  Patient was asked to remain cautious if there is persistent hypersomnolence. The benefit of weight loss in reducing severity of obstructive sleep apnea was discussed.  Patient would benefit from adhering to a strict diet to achieve ideal BMI.     Change of PAP supplies regularly is important for effective use.  Change of cushion on the mask or plugs on nasal pillows along with disposable filters once every month and change of mask frame, tubing, headgear and Velcro straps every 6 months at the minimum was reiterated.    This patient is compliant with PAP machine and benefits from its use.  Apnea hypopneas index is corrected/improved.  Daytime hypersomnolence has resolved.     Patient will follow up in this clinic in 1 year APRN    Thank you for allowing me to participate in your patient's care.    Electronically signed by Rei Sheriff MD, 05/14/24, 10:19 AM EDT.    Part of this note may be an electronic transcription/translation of spoken language to printed text using the Dragon Dictation System.

## 2024-05-23 DIAGNOSIS — E03.9 HYPOTHYROIDISM, UNSPECIFIED TYPE: ICD-10-CM

## 2024-05-23 RX ORDER — LEVOTHYROXINE SODIUM 112 UG/1
112 TABLET ORAL DAILY
Qty: 90 TABLET | Refills: 3 | Status: SHIPPED | OUTPATIENT
Start: 2024-05-23

## 2024-06-03 ENCOUNTER — APPOINTMENT (OUTPATIENT)
Dept: WOMENS IMAGING | Facility: HOSPITAL | Age: 74
End: 2024-06-03
Payer: MEDICARE

## 2024-06-03 PROCEDURE — 77063 BREAST TOMOSYNTHESIS BI: CPT | Performed by: RADIOLOGY

## 2024-06-03 PROCEDURE — 77067 SCR MAMMO BI INCL CAD: CPT | Performed by: RADIOLOGY

## 2024-06-04 ENCOUNTER — OFFICE VISIT (OUTPATIENT)
Dept: INTERNAL MEDICINE | Facility: CLINIC | Age: 74
End: 2024-06-04
Payer: MEDICARE

## 2024-06-04 VITALS
OXYGEN SATURATION: 96 % | HEIGHT: 58 IN | BODY MASS INDEX: 31.78 KG/M2 | DIASTOLIC BLOOD PRESSURE: 62 MMHG | HEART RATE: 95 BPM | SYSTOLIC BLOOD PRESSURE: 116 MMHG | WEIGHT: 151.4 LBS

## 2024-06-04 DIAGNOSIS — F41.0 PANIC DISORDER WITHOUT AGORAPHOBIA: ICD-10-CM

## 2024-06-04 DIAGNOSIS — I10 PRIMARY HYPERTENSION: Primary | ICD-10-CM

## 2024-06-04 DIAGNOSIS — E78.5 HYPERLIPIDEMIA, UNSPECIFIED HYPERLIPIDEMIA TYPE: ICD-10-CM

## 2024-06-04 PROCEDURE — 3074F SYST BP LT 130 MM HG: CPT | Performed by: STUDENT IN AN ORGANIZED HEALTH CARE EDUCATION/TRAINING PROGRAM

## 2024-06-04 PROCEDURE — 3051F HG A1C>EQUAL 7.0%<8.0%: CPT | Performed by: STUDENT IN AN ORGANIZED HEALTH CARE EDUCATION/TRAINING PROGRAM

## 2024-06-04 PROCEDURE — 99214 OFFICE O/P EST MOD 30 MIN: CPT | Performed by: STUDENT IN AN ORGANIZED HEALTH CARE EDUCATION/TRAINING PROGRAM

## 2024-06-04 PROCEDURE — 1126F AMNT PAIN NOTED NONE PRSNT: CPT | Performed by: STUDENT IN AN ORGANIZED HEALTH CARE EDUCATION/TRAINING PROGRAM

## 2024-06-04 PROCEDURE — 3078F DIAST BP <80 MM HG: CPT | Performed by: STUDENT IN AN ORGANIZED HEALTH CARE EDUCATION/TRAINING PROGRAM

## 2024-06-04 RX ORDER — IMIPRAMINE HCL 25 MG
75 TABLET ORAL NIGHTLY
Qty: 270 TABLET | Refills: 2 | Status: SHIPPED | OUTPATIENT
Start: 2024-06-04

## 2024-06-05 ENCOUNTER — APPOINTMENT (OUTPATIENT)
Dept: WOMENS IMAGING | Facility: HOSPITAL | Age: 74
End: 2024-06-05
Payer: MEDICARE

## 2024-06-05 PROCEDURE — 76642 ULTRASOUND BREAST LIMITED: CPT | Performed by: RADIOLOGY

## 2024-06-10 RX ORDER — ATENOLOL 50 MG/1
50 TABLET ORAL DAILY
Qty: 30 TABLET | Refills: 0 | Status: SHIPPED | OUTPATIENT
Start: 2024-06-10

## 2024-06-11 RX ORDER — VALSARTAN 160 MG/1
320 TABLET ORAL DAILY
Qty: 90 TABLET | Refills: 2 | Status: SHIPPED | OUTPATIENT
Start: 2024-06-11

## 2024-06-21 ENCOUNTER — LAB REQUISITION (OUTPATIENT)
Dept: LAB | Facility: HOSPITAL | Age: 74
End: 2024-06-21
Payer: MEDICARE

## 2024-06-21 ENCOUNTER — APPOINTMENT (OUTPATIENT)
Dept: WOMENS IMAGING | Facility: HOSPITAL | Age: 74
End: 2024-06-21
Payer: MEDICARE

## 2024-06-21 DIAGNOSIS — N63.0 UNSPECIFIED LUMP IN UNSPECIFIED BREAST: ICD-10-CM

## 2024-06-21 PROCEDURE — 88305 TISSUE EXAM BY PATHOLOGIST: CPT | Performed by: OBSTETRICS & GYNECOLOGY

## 2024-06-21 PROCEDURE — 88341 IMHCHEM/IMCYTCHM EA ADD ANTB: CPT | Performed by: OBSTETRICS & GYNECOLOGY

## 2024-06-21 PROCEDURE — 19084 BX BREAST ADD LESION US IMAG: CPT | Performed by: RADIOLOGY

## 2024-06-21 PROCEDURE — A4648 IMPLANTABLE TISSUE MARKER: HCPCS | Performed by: RADIOLOGY

## 2024-06-21 PROCEDURE — 19083 BX BREAST 1ST LESION US IMAG: CPT | Performed by: RADIOLOGY

## 2024-06-21 PROCEDURE — 88360 TUMOR IMMUNOHISTOCHEM/MANUAL: CPT | Performed by: OBSTETRICS & GYNECOLOGY

## 2024-06-21 PROCEDURE — 88342 IMHCHEM/IMCYTCHM 1ST ANTB: CPT | Performed by: OBSTETRICS & GYNECOLOGY

## 2024-06-24 ENCOUNTER — LAB (OUTPATIENT)
Dept: LAB | Facility: HOSPITAL | Age: 74
End: 2024-06-24
Payer: MEDICARE

## 2024-06-24 ENCOUNTER — OFFICE VISIT (OUTPATIENT)
Dept: ONCOLOGY | Facility: CLINIC | Age: 74
End: 2024-06-24
Payer: MEDICARE

## 2024-06-24 VITALS
TEMPERATURE: 98 F | OXYGEN SATURATION: 98 % | DIASTOLIC BLOOD PRESSURE: 76 MMHG | RESPIRATION RATE: 18 BRPM | SYSTOLIC BLOOD PRESSURE: 135 MMHG | WEIGHT: 153 LBS | HEART RATE: 95 BPM | BODY MASS INDEX: 31.99 KG/M2

## 2024-06-24 DIAGNOSIS — D72.825 BANDEMIA: Primary | ICD-10-CM

## 2024-06-24 DIAGNOSIS — D72.823 LEUKEMOID REACTION: ICD-10-CM

## 2024-06-24 LAB
ALBUMIN SERPL-MCNC: 3.8 G/DL (ref 3.5–5.2)
ALBUMIN/GLOB SERPL: 1.3 G/DL
ALP SERPL-CCNC: 167 U/L (ref 39–117)
ALT SERPL W P-5'-P-CCNC: 17 U/L (ref 1–33)
ANION GAP SERPL CALCULATED.3IONS-SCNC: 15.3 MMOL/L (ref 5–15)
AST SERPL-CCNC: 35 U/L (ref 1–32)
BASOPHILS # BLD AUTO: 0.09 10*3/MM3 (ref 0–0.2)
BASOPHILS NFR BLD AUTO: 0.9 % (ref 0–1.5)
BILIRUB SERPL-MCNC: 0.8 MG/DL (ref 0–1.2)
BUN SERPL-MCNC: 16 MG/DL (ref 8–23)
BUN/CREAT SERPL: 11.9 (ref 7–25)
CALCIUM SPEC-SCNC: 9.7 MG/DL (ref 8.6–10.5)
CHLORIDE SERPL-SCNC: 100 MMOL/L (ref 98–107)
CO2 SERPL-SCNC: 23.7 MMOL/L (ref 22–29)
CREAT SERPL-MCNC: 1.35 MG/DL (ref 0.57–1)
DEPRECATED RDW RBC AUTO: 48.4 FL (ref 37–54)
EGFRCR SERPLBLD CKD-EPI 2021: 42.1 ML/MIN/1.73
EOSINOPHIL # BLD AUTO: 0.2 10*3/MM3 (ref 0–0.4)
EOSINOPHIL NFR BLD AUTO: 2 % (ref 0.3–6.2)
ERYTHROCYTE [DISTWIDTH] IN BLOOD BY AUTOMATED COUNT: 15.5 % (ref 12.3–15.4)
GLOBULIN UR ELPH-MCNC: 3 GM/DL
GLUCOSE SERPL-MCNC: 130 MG/DL (ref 65–99)
HCT VFR BLD AUTO: 45.7 % (ref 34–46.6)
HGB BLD-MCNC: 15.7 G/DL (ref 12–15.9)
IMM GRANULOCYTES # BLD AUTO: 0.21 10*3/MM3 (ref 0–0.05)
IMM GRANULOCYTES NFR BLD AUTO: 2.1 % (ref 0–0.5)
LYMPHOCYTES # BLD AUTO: 2.91 10*3/MM3 (ref 0.7–3.1)
LYMPHOCYTES NFR BLD AUTO: 28.4 % (ref 19.6–45.3)
MCH RBC QN AUTO: 30 PG (ref 26.6–33)
MCHC RBC AUTO-ENTMCNC: 34.4 G/DL (ref 31.5–35.7)
MCV RBC AUTO: 87.4 FL (ref 79–97)
MONOCYTES # BLD AUTO: 0.9 10*3/MM3 (ref 0.1–0.9)
MONOCYTES NFR BLD AUTO: 8.8 % (ref 5–12)
NEUTROPHILS NFR BLD AUTO: 5.92 10*3/MM3 (ref 1.7–7)
NEUTROPHILS NFR BLD AUTO: 57.8 % (ref 42.7–76)
NRBC BLD AUTO-RTO: 0.2 /100 WBC (ref 0–0.2)
PLATELET # BLD AUTO: 326 10*3/MM3 (ref 140–450)
PMV BLD AUTO: 8.8 FL (ref 6–12)
POTASSIUM SERPL-SCNC: 3.5 MMOL/L (ref 3.5–5.2)
PROT SERPL-MCNC: 6.8 G/DL (ref 6–8.5)
RBC # BLD AUTO: 5.23 10*6/MM3 (ref 3.77–5.28)
SODIUM SERPL-SCNC: 139 MMOL/L (ref 136–145)
WBC NRBC COR # BLD AUTO: 10.23 10*3/MM3 (ref 3.4–10.8)

## 2024-06-24 PROCEDURE — 1126F AMNT PAIN NOTED NONE PRSNT: CPT | Performed by: INTERNAL MEDICINE

## 2024-06-24 PROCEDURE — 80053 COMPREHEN METABOLIC PANEL: CPT

## 2024-06-24 PROCEDURE — 36415 COLL VENOUS BLD VENIPUNCTURE: CPT

## 2024-06-24 PROCEDURE — 85025 COMPLETE CBC W/AUTO DIFF WBC: CPT

## 2024-06-24 PROCEDURE — 1159F MED LIST DOCD IN RCRD: CPT | Performed by: INTERNAL MEDICINE

## 2024-06-24 PROCEDURE — 1160F RVW MEDS BY RX/DR IN RCRD: CPT | Performed by: INTERNAL MEDICINE

## 2024-06-24 PROCEDURE — 3075F SYST BP GE 130 - 139MM HG: CPT | Performed by: INTERNAL MEDICINE

## 2024-06-24 PROCEDURE — 99214 OFFICE O/P EST MOD 30 MIN: CPT | Performed by: INTERNAL MEDICINE

## 2024-06-24 PROCEDURE — 3078F DIAST BP <80 MM HG: CPT | Performed by: INTERNAL MEDICINE

## 2024-06-24 NOTE — PROGRESS NOTES
Subjective         REASON FOR FOLLOW UP:  LEUKOCYTOSIS AND THROMBOCYTOSIS, TO RULE OUT MYELOPROLIFERATIVE DISORDER.    HISTORY OF PRESENT ILLNESS:   On 06/24/2024 this 73-year-old female who has been sent to us on several occasions with leukocytosis and no infection returns to the office for review. In the interim we have not found anything pertinent to explain her leukocytosis in absence of any infection, inflammatory condition, Cushing's syndrome, or leukemia. She states to me that she has had a bone marrow test done in this office many years ago before Epic was initiated and I will require further search. In any event at this time the only thing new in her health is that she has undergone a right breast biopsy at [facility] last week. Report of pathology is not available yet. Besides this she feels well. Energy level is appropriate. She is able to do whatever she pleases with no limitations, no bone or joint pain, no bleeding, no fever, no infections. Her appetite is good, her weight is stable, her bowel activity is lopez, urination is normal. No cardiac or respiratory issues of any nature. No febrile illness. No rash.     One more time she stated that many years ago before Epic was initiated in this office she underwent a bone marrow testing. This will require further research.        Past Medical History:   Diagnosis Date    Anxiety     Arthritis     Cardiomegaly     MILD    Cataract     Chronic kidney disease     Colon polyps     FOLLOWED BY DR. LUCY HILL    DDD (degenerative disc disease), lumbar     Diabetes mellitus 2021    Disease of thyroid gland     HYPOTHYROIDISM    Diverticulitis of colon     Elevated cholesterol     Excessive sleepiness     Fatty liver     GERD (gastroesophageal reflux disease)     Gout     Hard to intubate     History of cardiomegaly     History of leukocytosis     History of vitamin D deficiency     Hot flashes     HPV in female     Hyperlipidemia     Hypertension      Hypothyroidism     Insomnia     Leiomyoma     Low back pain     Lumbar radiculopathy     Lung disease     Obesity     DELIA (obstructive sleep apnea)     Panic disorder     Plantar fasciitis     Postmenopausal HRT (hormone replacement therapy)     PROGESTERONE AND ESTRADIAL    Pulmonary nodules     RIGHT MIDDLE LOBE    Renal insufficiency     Rosacea     Steatosis of liver     Tachycardia     Type 2 diabetes mellitus 2020        Past Surgical History:   Procedure Laterality Date    BARIATRIC SURGERY  2006    Removed lapband 2010    CHOLECYSTECTOMY N/A 03/12/2002    WITH CHOLANGIOGRAM, DR. LYNNETTE CHAVEZ AT [facility]    COLONOSCOPY N/A 10/04/2016    4 SESSILE TUBULAR ADENOMA POLYPS IN HEPATIC FLEXURE, 1 SESSILE TUBULAR ADENOMA AND 1 HYPERPLASTIC POLYP IN RECTUM, 6 MM SESSILE HYPERPLASTIC POLYP IN SIGMOID, 2 SESSILE HYPERPLASTI POLYPS IN RECTUM, MULTIPLE SMALL AND LARGE DIVERTICULA, RESCOPE IN 3 YRS, DR. LUCY HILL AT [facility]    COLONOSCOPY N/A 08/19/2003    ENTIRE COLON WNL, DR. ARIAS THOMASON AT [facility]    COLONOSCOPY N/A 10/07/2008    MULTIPLE LARGE MOUTHED DIVERTICULA IN SIGMOID, RESCOPE IN 5 YRS, DR. ARIAS THOMASON AT [facility]    COLONOSCOPY N/A 06/21/2011    DIVERTICULOSIS, MILD COLONIC SPASM, RESCOPE IN 5 YRS, DR. ARIAS THOMASON AT [facility]    COLONOSCOPY N/A 10/10/2019    5 MM HYPERPLASTIC POLYP IN CECUM, 5 MM HYPERPLASTIC POLYP IN ASCENDING, 5 MM HYPERPLASTIC POLYP IN TRANSVERSE, 5 MM HYPERPLASTIC POLYP IN DESCENDING, RESCOPE IN 3 YRS, DR. LUCY HILL AT [facility]    COLONOSCOPY N/A 11/29/2022    Procedure: COLONOSCOPY to cecum and TI;  with biopsies, cold bx polyps,;  Surgeon: Kathy Araya MD;  Location: [facility] ENDOSCOPY;  Service: Gastroenterology;  Laterality: N/A;  pre:  Diarrhea, left lower quadrant pain, abnormal CT scan of the sigmoid colon  post:  polyps, diverticulosis, abnormal colon mucosa, hemorrhoids    COSMETIC SURGERY N/A 1995    ABDOMINOPLASTY AND LIPOSUCTION, DR. MAUREEN [facility]    COSMETIC SURGERY Bilateral  1997    ARM REDUCTION, BRACHIOPLASTY, DR. MAUREEN [facility]    COSMETIC SURGERY Bilateral     UPPER EYELID BLEPHAROPLASTY    COSMETIC SURGERY N/A 10/13/2000    LIPECTOMY OF ABDOMINAL WALL, DR. MAUREEN [facility]    CYSTOSCOPY N/A 03/19/2014    DR. JERMAINE MURRIETA AT [facility]    DIAGNOSTIC LAPAROSCOPY N/A 06/04/2010    DEBRIDEMENT OF ABDOMINAL WALL, DR. SILVERIO AT [facility]    DILATATION AND CURETTAGE N/A 02/2003    ENDOSCOPY N/A 01/13/2006    REFLUX ESOPHAGITIS, OTHERWISE WNL, DR. JOSE J WELLS AT [facility]    ENDOSCOPY N/A 11/29/2022    Procedure: ESOPHAGOGASTRODUODENOSCOPY iwth biopsies;  Surgeon: Kathy Araya MD;  Location: [facility] ENDOSCOPY;  Service: Gastroenterology;  Laterality: N/A;  Pre:  epigastric pain  post:  gastritis, esophagitis,     GASTRIC BANDING REMOVAL N/A 07/2011    GASTRIC PORT CHANGE N/A     DR. SILVERIO AT [facility]    HERNIA REPAIR N/A 03/12/2002    VENTRAL HERNIA REPAIR, DR. KAITLYNN CHAVEZ AT [facility]    LAPAROSCOPIC GASTRIC BANDING N/A 02/21/2017    DR. SIVLERIO AT [facility]    LAPAROSCOPIC LYSIS OF ADHESIONS N/A 11/05/2018    DR. LORI HILL AT [facility]    SHOULDER ARTHROSCOPY W/ LABRAL REPAIR Left     AND DEBRIDEMENT OF ROTATOR CUFF    TENSION FREE VAGINAL TAPING WITH MINI ARC SLING N/A 03/19/2004    DR. JERMAINE MURRIETA AT [facility]    TUBAL ABDOMINAL LIGATION Bilateral 1982    UPPER GASTROINTESTINAL ENDOSCOPY  2022        Current Outpatient Medications on File Prior to Visit   Medication Sig Dispense Refill    allopurinol (ZYLOPRIM) 300 MG tablet Take 1 tablet by mouth Daily.      atenolol (TENORMIN) 50 MG tablet TAKE 1 TABLET BY MOUTH DAILY 30 tablet 0    atorvastatin (LIPITOR) 20 MG tablet Take 1 tablet by mouth Every Night. 90 tablet 2    Cholecalciferol (VITAMIN D3) 5000 units chewable tablet Chew 5,000 Units Daily.      empagliflozin (Jardiance) 10 MG tablet tablet Take 1 tablet by mouth Daily.      furosemide (LASIX) 40 MG tablet Take 1 tablet by mouth Daily.       "glucose blood test strip Use to test glucose once daily. Dx DM 2 E11.9 100 each 11    glucose blood test strip       imipramine (TOFRANIL) 25 MG tablet Take 3 tablets by mouth Every Night. 270 tablet 2    ketoconazole (NIZORAL) 2 % shampoo       levothyroxine (SYNTHROID, LEVOTHROID) 112 MCG tablet Take 1 tablet by mouth Daily. 90 tablet 3    Nutritional Supplements (Silica) 12.5 MG capsule       omeprazole (priLOSEC) 40 MG capsule Take 1 capsule by mouth Daily. 90 capsule 3    Peppermint Oil (IBgard) 90 MG capsule controlled-release       Probiotic Product capsule       Semaglutide, 2 MG/DOSE, (OZEMPIC) 8 MG/3ML solution pen-injector Inject 2 mg under the skin into the appropriate area as directed 1 (One) Time Per Week. 3 mL 5    valsartan (DIOVAN) 160 MG tablet TAKE 2 TABLETS BY MOUTH DAILY 90 tablet 2     No current facility-administered medications on file prior to visit.        ALLERGIES:    Allergies   Allergen Reactions    Other Dermatitis, Itching and Swelling     \"surgical glue\"    Wound Dressing Adhesive Itching    Duloxetine Other (See Comments)     Other reaction(s): Decreased libido  DECREASED LIBIDO    Erythromycin Nausea Only    Formaldehyde Itching    Minocycline Swelling     GENERALIZED    Nsaids Other (See Comments)     Avoids due to CKD     Tea Tree Oil Other (See Comments)     Found during allergy test    Venlafaxine Other (See Comments)     Other reaction(s): Decreased libido  DECREASED LIBIDO.        Social History     Socioeconomic History    Marital status:      Spouse name: Lincoln    Number of children: 2   Tobacco Use    Smoking status: Former     Current packs/day: 0.00     Average packs/day: 0.5 packs/day for 20.0 years (10.0 ttl pk-yrs)     Types: Cigarettes     Start date: 1972     Quit date: 1992     Years since quittin.5    Smokeless tobacco: Never   Vaping Use    Vaping status: Never Used   Substance and Sexual Activity    Alcohol use: Yes     Comment: Do not drink " daily or weekly    Drug use: No    Sexual activity: Yes     Partners: Male     Birth control/protection: Post-menopausal, Tubal ligation        Family History   Problem Relation Age of Onset    Heart murmur Mother     Hyperlipidemia Mother     Heart disease Mother     Hypertension Mother     Alcohol abuse Father     Liver disease Father     Cirrhosis Father     COPD Father     Liver cancer Father     Hypertension Sister     Heart disease Sister     Heart disease Brother     Basal cell carcinoma Brother     Hypertension Brother     Diabetes Brother     Gout Brother     Prostate cancer Brother     Obesity Brother     Valvular heart disease Daughter     No Known Problems Son     Cancer Maternal Grandmother     Suicidality Paternal Grandfather           Objective     Vitals:    06/24/24 1305   BP: 135/76   Pulse: 95   Resp: 18   Temp: 98 °F (36.7 °C)   SpO2: 98%   Weight: 69.4 kg (153 lb)   PainSc: 0-No pain             4/11/2024     8:23 AM   Current Status   ECOG score 0       EXAM:           GENERAL:  Well-developed, Patient  in no acute distress.   SKIN:  Warm, dry ,NO purpura ,no rash.  HEENT:  Pupils were equal and reactive to light and accomodation, conjunctivae noninjected,  normal visual acuity.   NECK:  Supple with good range of motion; no thyromegaly , no JVD or bruits,.No carotid artery pain, no carotid abnormal pulsation   LYMPHATICS:  No cervical, NO supraclavicular, NO axillary, NO inguinal adenopathies.  CARDIAC   normal rate , regular rhythm, without murmur,NO rubs NO S3 NO S4   LUNGS: normal breath sounds bilateral, no wheezing, NO rhonchi, NO crackles ,NO rubs.  VASCULAR VENOUS: no cyanosis, NO collateral circulation, NO varicosities, NO edema, NO palpable cords, NO pain,NO erythema, NO pigmentation of the skin.  ABDOMEN:  Soft, NO pain,no hepatomegaly, no splenomegaly,no masses, no ascites, no collateral circulation,no distention.  EXTREMITIES  AND SPINE:  No clubbing, no cyanosis ,no deformities ,  no pain .No kyphosis,  no pain in spine, no pain in ribs , no pain in pelvic bone.  NEUROLOGICAL:  Patient was awake, alert, oriented to time, person and place.  Right breast biopsy site with no hematoma formation, minimal ecchymosis in the skin in the lower outer quadrant.         Hematology WBC   Date Value Ref Range Status   06/24/2024 10.23 3.40 - 10.80 10*3/mm3 Final   10/25/2023 16.43 (H) 3.40 - 10.80 10*3/mm3 Final   03/31/2023 10.25 4.5 - 11.0 10*3/uL Final     RBC   Date Value Ref Range Status   06/24/2024 5.23 3.77 - 5.28 10*6/mm3 Final   10/25/2023 5.04 3.77 - 5.28 10*6/mm3 Final   03/31/2023 4.49 4.0 - 5.2 10*6/uL Final     Hemoglobin   Date Value Ref Range Status   06/24/2024 15.7 12.0 - 15.9 g/dL Final   03/31/2023 13.6 12.0 - 16.0 g/dL Final     Hematocrit   Date Value Ref Range Status   06/24/2024 45.7 34.0 - 46.6 % Final   03/31/2023 41.8 36.0 - 46.0 % Final     Platelets   Date Value Ref Range Status   06/24/2024 326 140 - 450 10*3/mm3 Final   03/31/2023 464 (H) 140 - 440 10*3/uL Final       CBC:    WBC   Date Value Ref Range Status   06/24/2024 10.23 3.40 - 10.80 10*3/mm3 Final   10/25/2023 16.43 (H) 3.40 - 10.80 10*3/mm3 Final   03/31/2023 10.25 4.5 - 11.0 10*3/uL Final     RBC   Date Value Ref Range Status   06/24/2024 5.23 3.77 - 5.28 10*6/mm3 Final   10/25/2023 5.04 3.77 - 5.28 10*6/mm3 Final   03/31/2023 4.49 4.0 - 5.2 10*6/uL Final     Hemoglobin   Date Value Ref Range Status   06/24/2024 15.7 12.0 - 15.9 g/dL Final   03/31/2023 13.6 12.0 - 16.0 g/dL Final     Hematocrit   Date Value Ref Range Status   06/24/2024 45.7 34.0 - 46.6 % Final   03/31/2023 41.8 36.0 - 46.0 % Final     MCV   Date Value Ref Range Status   06/24/2024 87.4 79.0 - 97.0 fL Final   03/31/2023 93.1 80.0 - 100.0 fL Final     MCH   Date Value Ref Range Status   06/24/2024 30.0 26.6 - 33.0 pg Final   03/31/2023 30.3 26.0 - 34.0 pg Final     MCHC   Date Value Ref Range Status   06/24/2024 34.4 31.5 - 35.7 g/dL Final    03/31/2023 32.5 31.0 - 37.0 g/dL Final     RDW   Date Value Ref Range Status   06/24/2024 15.5 (H) 12.3 - 15.4 % Final   03/31/2023 14.4 12.0 - 16.8 % Final     RDW-SD   Date Value Ref Range Status   06/24/2024 48.4 37.0 - 54.0 fl Final     MPV   Date Value Ref Range Status   06/24/2024 8.8 6.0 - 12.0 fL Final   03/31/2023 9.5 8.4 - 12.4 fL Final     Platelets   Date Value Ref Range Status   06/24/2024 326 140 - 450 10*3/mm3 Final   03/31/2023 464 (H) 140 - 440 10*3/uL Final     Neutrophil Rel %   Date Value Ref Range Status   03/31/2023 51.7 45 - 80 % Final     Neutrophil %   Date Value Ref Range Status   06/24/2024 57.8 42.7 - 76.0 % Final     Lymphocyte Rel %   Date Value Ref Range Status   03/31/2023 33.1 15 - 50 % Final     Lymphocyte %   Date Value Ref Range Status   06/24/2024 28.4 19.6 - 45.3 % Final     Monocyte Rel %   Date Value Ref Range Status   03/31/2023 8.8 0 - 15 % Final     Monocyte %   Date Value Ref Range Status   06/24/2024 8.8 5.0 - 12.0 % Final     Eosinophil %   Date Value Ref Range Status   06/24/2024 2.0 0.3 - 6.2 % Final   03/31/2023 4.1 0 - 7 % Final     Basophil Rel %   Date Value Ref Range Status   03/31/2023 0.8 0 - 2 % Final     Basophil %   Date Value Ref Range Status   06/24/2024 0.9 0.0 - 1.5 % Final     Immature Grans %   Date Value Ref Range Status   06/24/2024 2.1 (H) 0.0 - 0.5 % Final   03/31/2023 1.5 (H) 0.0 - 1.0 % Final     Neutrophils Absolute   Date Value Ref Range Status   03/31/2023 5.31 2.0 - 8.8 10*3/uL Final     Neutrophils, Absolute   Date Value Ref Range Status   06/24/2024 5.92 1.70 - 7.00 10*3/mm3 Final     Lymphocytes Absolute   Date Value Ref Range Status   03/31/2023 3.39 0.7 - 5.5 10*3/uL Final     Lymphocytes, Absolute   Date Value Ref Range Status   06/24/2024 2.91 0.70 - 3.10 10*3/mm3 Final     Monocytes Absolute   Date Value Ref Range Status   03/31/2023 0.90 0.0 - 1.7 10*3/uL Final     Monocytes, Absolute   Date Value Ref Range Status   06/24/2024 0.90  0.10 - 0.90 10*3/mm3 Final     Eosinophils Absolute   Date Value Ref Range Status   03/31/2023 0.42 0.0 - 0.8 10*3/uL Final     Eosinophils, Absolute   Date Value Ref Range Status   06/24/2024 0.20 0.00 - 0.40 10*3/mm3 Final     Basophils Absolute   Date Value Ref Range Status   03/31/2023 0.08 0.0 - 0.2 10*3/uL Final     Basophils, Absolute   Date Value Ref Range Status   06/24/2024 0.09 0.00 - 0.20 10*3/mm3 Final     Immature Grans, Absolute   Date Value Ref Range Status   06/24/2024 0.21 (H) 0.00 - 0.05 10*3/mm3 Final   03/31/2023 0.15 (H) 0.00 - 0.10 10*3/uL Final     nRBC   Date Value Ref Range Status   06/24/2024 0.2 0.0 - 0.2 /100 WBC Final        CMP:    Glucose   Date Value Ref Range Status   06/24/2024 130 (H) 65 - 99 mg/dL Final     BUN   Date Value Ref Range Status   06/24/2024 16 8 - 23 mg/dL Final   11/10/2022 40 (H) 10 - 20 mg/dL Final     Creatinine   Date Value Ref Range Status   06/24/2024 1.35 (H) 0.57 - 1.00 mg/dL Final   11/10/2022 2.11 (H) 0.55 - 1.02 mg/dL Final   01/10/2022 104.0 15 - 500 mg/dL Final     Sodium   Date Value Ref Range Status   06/24/2024 139 136 - 145 mmol/L Final   11/10/2022 138 136 - 145 mmol/L Final     Potassium   Date Value Ref Range Status   06/24/2024 3.5 3.5 - 5.2 mmol/L Final     Comment:     Slight hemolysis detected by analyzer. Result may be falsely elevated.   11/10/2022 5.4 (H) 3.5 - 5.1 mmol/L Final     Chloride   Date Value Ref Range Status   06/24/2024 100 98 - 107 mmol/L Final   11/10/2022 108 (H) 98 - 107 mmol/L Final     CO2   Date Value Ref Range Status   06/24/2024 23.7 22.0 - 29.0 mmol/L Final     Total CO2   Date Value Ref Range Status   11/10/2022 18 (L) 22 - 29 mmol/L Final   04/18/2022 27 22 - 29 mmol/L Final     Calcium   Date Value Ref Range Status   06/24/2024 9.7 8.6 - 10.5 mg/dL Final   11/10/2022 9.8 8.4 - 10.2 mg/dL Final     Total Protein   Date Value Ref Range Status   06/24/2024 6.8 6.0 - 8.5 g/dL Final   09/09/2022 6.8 6.2 - 8.0 g/dL  Final     Albumin   Date Value Ref Range Status   06/24/2024 3.8 3.5 - 5.2 g/dL Final   09/09/2022 4.0 3.2 - 4.6 g/dL Final     ALT (SGPT)   Date Value Ref Range Status   06/24/2024 17 1 - 33 U/L Final   09/09/2022 6 (L) 10 - 35 U/L Final     AST (SGOT)   Date Value Ref Range Status   06/24/2024 35 (H) 1 - 32 U/L Final     Comment:     Slight hemolysis detected by analyzer. Result may be falsely elevated.   09/09/2022 20 10 - 35 U/L Final     Alkaline Phosphatase   Date Value Ref Range Status   06/24/2024 167 (H) 39 - 117 U/L Final   09/09/2022 136 40 - 150 U/L Final     Total Bilirubin   Date Value Ref Range Status   06/24/2024 0.8 0.0 - 1.2 mg/dL Final   09/09/2022 0.3 0.2 - 1.2 mg/dL Final     eGFR  Am   Date Value Ref Range Status   11/10/2022 28 (L) >60 /1.73 m2 Final     Comment:     (note)  Results do not take into account body mass.  Valid for patients 18  to 70 years of age.     Globulin   Date Value Ref Range Status   06/24/2024 3.0 gm/dL Final   09/09/2022 2.8 1.5 - 4.5 g/dL Final     A/G Ratio   Date Value Ref Range Status   06/24/2024 1.3 g/dL Final     BUN/Creatinine Ratio   Date Value Ref Range Status   06/24/2024 11.9 7.0 - 25.0 Final   11/10/2022 18.9 RATIO Final     Anion Gap   Date Value Ref Range Status   06/24/2024 15.3 (H) 5.0 - 15.0 mmol/L Final   11/10/2022 12 5 - 13 (arb'U) Final     Comment:     (note)  Calculation- Na - (Cl + CO2)                 Assessment & Plan     Diagnoses and all orders for this visit:    1. Bandemia (Primary)              On 11/16/2023 I have reviewed this patient's peripheral blood. The following is the report of this.    1. White blood cells. There is minimal increase in the total amount of white blood cells, there is shift of maturation. Some tendency to hypersegmentation of the poles. There are no blasts or plasma cells in circulation. There is no toxic granulation.  2. Red cells. Red cells are normocytic, normochromic with no inclusions no fragments, no  spherocytes. No cigar cells. No rouleaux formation.   3. Platelets. Normal platelet count and morphology. No platelet clumps.    In summary this patient has had some leukocytosis and thrombocytosis at least on 2 blood measurements. Today her thrombocytosis has disappeared, the leukocytosis has persisted. Obviously the first differential diagnosis will be that this patient has an infection somewhere. An infection that she has had in the soft tissues of the right eye will not count for this response. There is no other inflammatory or infectious process in this patient as far as I can tell on clinical grounds. This raises the question if obesity is part of the problem and maybe is the problem.    The other phenomenon that could be happening whenever thrombocytosis and leukocytosis are altogether and now that the patient also has an increase in the hemoglobin will be a myeloproliferative disorder. For this reason we will proceed with at least a BCR/ABL in peripheral blood.     I have not modified any of the medicines that she is taking neither have provided any new medicines. She went back to the lab to proceed with laboratory testing that includes CMP, sed rate, C-reactive protein, B12, folic acid level, ferritin, iron profile, LDH and a BCR/ABL. In 4 weeks upon return she will have a repeat CBC.     I discussed all of these facts with the patient. She was grateful about the conversation and the support provided.     On 12/12/2023 the patient was further reviewed. Since the previous visit consultation the patient has had laboratory testing that included a normal sedimentation rate, normal C-reactive protein, normal LDH in order to evaluate her leukocytosis. She has not had any fever or infectious process. Also we proceeded with BCR/ABL in peripheral blood by PCR. This analysis was negative to diagnose chronic myeloid leukemia.     The only number that called my attention was her creatinine elevation of 1.8. she is  known to have chronic medical kidney disease with thin kidney cortexes bilaterally and a calculus in the left kidney, staghorn with no infection and no hydronephrosis. The right kidney is bigger than the left kidney in radiological assessment posted above.     From my point of view her white count today is normal, hemoglobin is normal, the platelet count is normal and I have nothing else to report in regard to the leukocytosis in this patient. Therefore I will not need to give her any medication, modify any medicine that she is taking with the exception for her to discuss with her nephrologist the use or not of Lasix under the present circumstances.     Besides this I have not offered the patient any other intervention because I do not believe from my field there is anything necessary. I answered the question of the leukocytosis, I do not know what happened with this in this regard but at least she has no inflammatory marker alteration to make any other definitive diagnosis and she has nothing to suggest chronic myeloid leukemia.     I will not need to bring her back for review in the future. A letter will be sent to her primary nephrologist in this regard. Discussed with the patient in detail.    On 03/18/2024, it caught my attention, the patient's alopecia, the yellow nails and the moon-like face that suggests to me that this patient could have some other endocrinological condition, for example Cushing's syndrome. The patient is not taking any prednisone. She is not taking any supplements of any nature and I feel the obligation for this patient to be tested for cortisol-AM and ACTH.     Today, the patient's white count, hemoglobin and platelets are normal here in the office. The patient has no splenomegaly and she has no obvious recent infections. The scan that we have from her abdomen is 2022 and I have the belief that the patient needs to have complete assessment in regard to this variable degree of  leukocytosis, increased hemoglobin and increased platelet count. Typically, this is not the behavior of myeloproliferative disorder when 1 week the numbers are normal and the next week the numbers are very abnormal. This is very peculiar in this patient. Again, she is not doing anything different to her body, any new medicines and nothing different clinically besides what I just mentioned. Therefore, we will obtain a myeloproliferative disorder testing in peripheral blood and also we will perform a JAK2 mutation and we will proceed with radiological assessment of her abdomen to reassess spleen size. I also am concerned of the fact that she also has had staghorn calculus in the right kidney. This is not symptomatic but I want to review the status of this to be sure there are no other cofactors that are modifying her laboratory parameters.     I have not prescribed any medicines for her and neither have I modified any of the medicines that she is doing. I will review her back in a few days after she has all this testing, repeat her CBC and then go from there. I would not be surprised if in the long run the patient needs to have bone marrow testing.     I discussed all these facts with the patient today. As soon as laboratory testing starts to become available, any issues that needs to be discussed with her right away will be called to her by me or my nurse.     I have placed a copy of the report by Dr. Lopez available to me by the patient that will be scanned into the medical record.    On 06/24/2024 we have performed a clinical detailed examination on this patient finding no infection, no inflammatory condition and nothing to explain leukocytosis. Today her white count, hemoglobin, platelets and white count differential are normal. She has no splenomegaly. She has undergone JAK2 mutation, MPL mutation and BCR/ABL mutation all of them negative. Her LDH is normal. Her cortisol level baseline was normal.     On the  basis of all of this the patient has been advised to return to see us back in 4 months. If any other issues in regard to white count the only thing that she has not had done by me is bone marrow testing. Furthermore she had a bone marrow test done in this office many years ago before Epic was initiated and we will try to find that information if possible. Today her white count, white count differential, hemoglobin and platelets are completely normal. She is asymptomatic with no peripheral adenopathy, no hepatosplenomegaly.    The patient will be watched in absence of any other information.    I have requested my assistant to get the information pertinent to her breast biopsy done a few days ago.    Depending on what the pathology of the breast shows we will decide how to proceed in this regard.

## 2024-06-25 ENCOUNTER — TELEPHONE (OUTPATIENT)
Dept: INTERNAL MEDICINE | Facility: CLINIC | Age: 74
End: 2024-06-25
Payer: MEDICARE

## 2024-06-25 NOTE — TELEPHONE ENCOUNTER
I called Sravanthi Burns at [phone] at 17:01 EDT     I called patient about Pathology results and [facility]    States she was already told [facility]. Follows with Dr Haley with hematology.

## 2024-06-26 ENCOUNTER — TRANSCRIBE ORDERS (OUTPATIENT)
Dept: SURGERY | Facility: CLINIC | Age: 74
End: 2024-06-26
Payer: MEDICARE

## 2024-06-26 ENCOUNTER — TELEPHONE (OUTPATIENT)
Dept: SURGERY | Facility: CLINIC | Age: 74
End: 2024-06-26
Payer: MEDICARE

## 2024-06-26 DIAGNOSIS — C50.811 MALIGNANT NEOPLASM OF OVERLAPPING SITES OF RIGHT FEMALE BREAST, UNSPECIFIED ESTROGEN RECEPTOR STATUS: Primary | ICD-10-CM

## 2024-06-26 NOTE — TELEPHONE ENCOUNTER
New Patient appointment with Dr. Velasco:   7/10 at 9 AM. Patient is aware to arrive 15 minutes prior with paperwork filled out    Patient also aware if a breast MRI is ordered prior to consult, results will be given at time of consult.     Patient expressed v/u of appointment date and time.     New patient packet mailed.

## 2024-06-27 LAB
DX PRELIMINARY: NORMAL
LAB AP CASE REPORT: NORMAL
LAB AP CLINICAL INFORMATION: NORMAL
LAB AP DIAGNOSIS COMMENT: NORMAL
LAB AP SPECIAL STAINS: NORMAL
LAB AP SYNOPTIC CHECKLIST: NORMAL
PATH REPORT.ADDENDUM SPEC: NORMAL
PATH REPORT.FINAL DX SPEC: NORMAL
PATH REPORT.GROSS SPEC: NORMAL

## 2024-06-28 ENCOUNTER — TELEPHONE (OUTPATIENT)
Dept: SURGERY | Facility: CLINIC | Age: 74
End: 2024-06-28
Payer: MEDICARE

## 2024-06-28 NOTE — TELEPHONE ENCOUNTER
I called a 1 10 mg valium to patients following pharmacy.     Take upon arrival to hospital for procedure.    [facility] [id number] - [place], [place] [facility] [address] [facility] [phone] [facility] [phone] FX [[id number]]

## 2024-07-01 ENCOUNTER — TELEPHONE (OUTPATIENT)
Dept: ONCOLOGY | Facility: CLINIC | Age: 74
End: 2024-07-01
Payer: MEDICARE

## 2024-07-01 NOTE — TELEPHONE ENCOUNTER
issue Pathology Exam: [id number]  Order: [id number]  Status: Edited Result - FINAL       Visible to patient: Yes (seen)       Next appt: 07/07/2024 at 03:30 PM in Radiology ([facility] MRI)       Dx: Unspecified lump in unspecified breast    Specimen Information: 1: Breast, Right; Tissue    2: Breast, Right; Tissue   0 Result Notes      Component    Addendum   Please see the completely scanned HER2 FISH report from [facility] below.    Addendum electronically signed by Tamara Arzate MD on 6/27/2024 at 1033   Case Report   Surgical Pathology Report                         Case: [id number]                                   Authorizing Provider:  Kathy Echevarria MD        Collected:           06/21/2024 11:50 AM           Ordering Location:     [facility]  Received:            06/21/2024 02:47 PM                                  LABORATORY                                                                   Pathologist:           Tamara Arzate MD                                                     Specimens:   1) - Breast, Right, right breast 9/930, formalin at 1159, tumark Q clip, 11 cores, 12                g needle                                                                                            2) - Breast, Right, right breast 1000, tumark vision clip, 15 cores, 12 g needle,                    formalin at 1140                                                                          Clinical Information    Masses   Final Diagnosis   1.  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               -Histologic grade: [place] histologic score II (tubules = 3, nuclei = 2, mitosis = 1).               -Base of carcinoma involves multiple tissue cores, measuring up to 11 mm maximally.               -No definitive in situ component identified.               -No lymphovascular nor perineural  invasion identified.     2.  Right breast, 10:00, ultrasound-guided biopsies for a mass (vision clip):               -Hyalinized fibroadenoma.               -Focal microcalcification associated with benign breast duct.               -No atypical hyperplasia, in situ nor invasive carcinoma identified.     [facility]   Electronically signed by Tamara Arzate MD on 6/26/2024 at 0826   Preliminary Diagnosis   1.  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               -Histologic grade: [place] histologic score II (tubules = 3, nuclei = 2, mitosis = 1).               -Base of carcinoma involves multiple tissue cores, measuring up to 11 mm maximally.               -No definitive in situ component identified.               -No lymphovascular nor perineural invasion identified.     2.  Right breast, 10:00, ultrasound-guided biopsies for a mass (vision clip):               -Hyalinized fibroadenoma.               -Focal microcalcification associated with benign breast duct.               -No atypical hyperplasia, in situ nor invasive carcinoma identified.     SW   Preliminary result electronically signed by Tamara Arzate MD on 6/24/2024 at 1501   Synoptic Checklist   Breast Biomarker Reporting Template   Protocol posted: 12/13/2023BREAST BIOMARKER REPORTING TEMPLATE - All Specimens  Test(s) Performed     Estrogen Receptor (ER) Status  Positive (greater than 10% of cells demonstrate nuclear positivity)   Percentage of Cells with Nuclear Positivity  %   Average Intensity of Staining  Strong   Test Type  Food and Drug Administration (FDA) cleared (test / vendor): ventana   Primary Antibody  SP1   Scoring System  Tiffany   Proportion Score  5   Intensity Score  3   Total Tiffany Score  8   Test(s) Performed     Progesterone Receptor (PgR) Status  Positive   Percentage of Cells with Nuclear Positivity  61-70%  "  Average Intensity of Staining  Moderate   Test Type  Food and Drug Administration (FDA) cleared (test / vendor): ventana   Primary Antibody  1E2   Scoring System  Tiffany   Proportion Score  5   Intensity Score  2   Total Tiffany Score  7   Test(s) Performed     HER2 by Immunohistochemistry  Equivocal (Score 2+)   Test Type  Food and Drug Administration (FDA) cleared (test / vendor): ventana   Primary Antibody  4B5   Test(s) Performed  Ki-67   Ki-67 Percentage of Positive Nuclei  17 %   Primary Antibody  30-9   Cold Ischemia and Fixation Times  Meet requirements specified in latest version of the ASCO / CAP Guidelines   Cold Ischemia Time (minutes)  3 min   Fixation Time (hours)  8.5 hours   Testing Performed on Block Number(s)  1B   METHODS   Fixative  Formalin   Image Analysis  Not performed   .      Comment    Representative slides from parts 1 and 2 of this case are reviewed internally with Dr. Molina, who concurs.   Gross Description    1. Breast, Right.  Received in formalin, labeled with the patient's name, and designated \" right breast biopsy at 9-9 30 o'clock\", are 10 focally indurated, yellow to gray-white core fragments of fibrofatty soft tissue measuring from 0.9 cm up to 1.8 x 0.2 cm in maximal tubal diameter.  Also received in the container are additional fragments of friable, yellow, fibrofatty soft tissue measuring in aggregate 2.4 x 0.9 x 0.1 cm.  The entire specimen is inked in black and submitted as follows:     1A: 5 cores  1B: 5 cores  1C: Remaining aggregate of tissue     Cold ischemic time: 9 minutes  Total fixation time: 8 hours  Clip shape: Tumark Q  Number of cores: 11  Needle gauge: 12     mb/uso/swm        2. Breast, Right.  Received in formalin, labeled with the patient's name, and designated \" right breast biopsy at 10:00\", are 11 rubbery and focally indurated, yellow to gray-white core fragments of fibrofatty soft tissue measuring from 1.0 cm up to 2.4 x 0.5 cm in maximal tubal diameter. " " Also received in the container are additional fragments of friable, yellow, fibrofatty soft tissue measuring in aggregate 2.0 x 0.6 x 0.1 cm.  The entire specimen is inked in red and submitted as follows:     2A: 4 cores  2B: 4 cores  2C: 3 cores and remaining aggregate of tissue     Cold ischemic time: 3 minutes  Total fixation time: 8.5 hours  Clip shape: Tumark vision  Number of cores: 15  Needle gauge: 12     mb/uso/swm         Special Stains    Utilizing appropriate controls, multiple immunohistochemical stains are performed on specimen 1 including E-cadherin, CK7, GATA3, p120, ER, HI, HER2/aaron and Ki-67.  The hormone receptors and Ki67 are reported in the attached biomarker template.  The tumor cells are positive for CK7 and GATA3, supporting mammary origin.  P120 and E-cadherin are membranous, supporting a ductal phenotype.     ER / HI Scoring Guide:  Nuclear staining of tumor cells equal to or greater than 1%, of any intensity, is considered a positive result with less than 1% considered negative, when controls are adequate.  ER/HI Disclaimer: All breast markers (Confirm anti-estrogen receptor clone SP1 and Confirm anti-progesterone receptor clone 1E2) are performed utilizing the ultra-View DAB kit on the Benchmark Ultra platform, all manufactured by [employer], [place], [place].  Reagents utilized are FDA approved for in vitro diagnostic use.  These tests are not intended as a confirmation of the original diagnosis.  They should only be used in conjunction with other established risk factors, clinical and diagnostic procedures.      Patients with breast cancers that are HER2 IHC 3+ or IHC 2+/SREEDHAR amplified may be eligible for several therapies that disrupt HER2 signaling pathways. Invasive breast cancers that test \"HER2-negative\" (IHC 0, 1+ or 2+/SREEDHAR not-amplified) are more specifically considered \"HER2-negative for protein overexpression/gene amplification\" since non-overexpressed levels of the HER2 " protein may be present in these cases. Patients with breast cancers that are HER2 IHC 1+ or IHC 2+/SREEDHAR not-amplified may be eligible for a treatment that targets non-amplified/non-overexpressed levels of HER2 expression for cytotoxic drug delivery (IHC 0 results do not result in eligibility currently).   [facility]      On 2024 I have called this patient with the report of her mammogram biopsy. Multiple samples were obtained of her breast during the recent procedure. There is obviously evidence of invasive breast cancer on the right side and she wants to know in what direction things need to be run. She wanted to have an opinion at [facility] in [place] and I think that is a good idea especially because of her fluctuating blood counts as we have discussed before. On the other hand now she has a 2nd situation that is her breast cancer and that opinion also could count. Therefore I do believe that the patient needs to do the followin. She needs to follow up with, Eveline Velasco MD, in regard recommendation about lumpectomy, sentinel lymph node sampling, MRIs of the breast and genetic counseling.  2. She needs to come back to the office in a month or so, by then we should have a lot of questions answered in regard to what kind of adjuvant therapy she is going to need at that point.  3. The patient would like to have the [facility] sent 2 different consultations, one in breast oncology and 2nd benign hematology.  4. The patient was extremely appreciative of the phone call that she received today because not only has it informed her about anything about anything.     I will discuss this with our assistants to make official referrals [facility] in [place] and be sure that she follows up in the office with, Eveline Velasco MD, and her follow up MRI.    I pointed out to her that I have no way to know from the biopsy that we have available what  stage the malignancy is and the lumpectomy and the sentinel lymph nodes will give us the information, also Oncotype can give us more information and then go from there.

## 2024-07-07 ENCOUNTER — HOSPITAL ENCOUNTER (OUTPATIENT)
Dept: MRI IMAGING | Facility: HOSPITAL | Age: 74
Discharge: HOME | End: 2024-07-07
Admitting: SURGERY
Payer: MEDICARE

## 2024-07-07 DIAGNOSIS — Z17.0 MALIGNANT NEOPLASM OF OVERLAPPING SITES OF RIGHT BREAST IN FEMALE, ESTROGEN RECEPTOR POSITIVE: ICD-10-CM

## 2024-07-07 DIAGNOSIS — C50.811 MALIGNANT NEOPLASM OF OVERLAPPING SITES OF RIGHT BREAST IN FEMALE, ESTROGEN RECEPTOR POSITIVE: ICD-10-CM

## 2024-07-07 PROCEDURE — C8908 MRI W/O FOL W/CONT, BREAST,: HCPCS

## 2024-07-07 PROCEDURE — A9577 INJ MULTIHANCE: HCPCS | Performed by: SURGERY

## 2024-07-07 PROCEDURE — C8937 CAD BREAST MRI: HCPCS

## 2024-07-07 PROCEDURE — 0 GADOBENATE DIMEGLUMINE 529 MG/ML SOLUTION: Performed by: SURGERY

## 2024-07-07 RX ADMIN — GADOBENATE DIMEGLUMINE 15 ML: 529 INJECTION, SOLUTION INTRAVENOUS at 16:14

## 2024-07-08 ENCOUNTER — LAB (OUTPATIENT)
Dept: ENDOCRINOLOGY | Age: 74
End: 2024-07-08
Payer: MEDICARE

## 2024-07-08 DIAGNOSIS — E03.9 HYPOTHYROIDISM, UNSPECIFIED TYPE: ICD-10-CM

## 2024-07-08 LAB
T4 FREE SERPL-MCNC: 1.52 NG/DL (ref 0.92–1.68)
TSH SERPL DL<=0.005 MIU/L-ACNC: 0.07 UIU/ML (ref 0.27–4.2)

## 2024-07-09 ENCOUNTER — TELEPHONE (OUTPATIENT)
Dept: ENDOCRINOLOGY | Age: 74
End: 2024-07-09

## 2024-07-09 DIAGNOSIS — E03.9 HYPOTHYROIDISM, UNSPECIFIED TYPE: ICD-10-CM

## 2024-07-09 PROCEDURE — 77049 MRI BREAST C-+ W/CAD BI: CPT | Performed by: RADIOLOGY

## 2024-07-09 RX ORDER — LEVOTHYROXINE SODIUM 0.1 MG/1
100 TABLET ORAL DAILY
Qty: 90 TABLET | Refills: 3 | Status: SHIPPED | OUTPATIENT
Start: 2024-07-09

## 2024-07-09 RX ORDER — FUROSEMIDE 40 MG/1
40 TABLET ORAL DAILY
Qty: 90 TABLET | Refills: 2 | Status: SHIPPED | OUTPATIENT
Start: 2024-07-09

## 2024-07-09 NOTE — TELEPHONE ENCOUNTER
Caller: KatySravanthi    Relationship: Self    Best call back number: [phone]     Requested Prescriptions:   Requested Prescriptions     Pending Prescriptions Disp Refills    furosemide (LASIX) 40 MG tablet       Sig: Take 1 tablet by mouth Daily.        Pharmacy where request should be sent: [facility] [id number] [facility] [address] AT [facility] [phone] [address] [phone] FX     Last office visit with prescribing clinician: 6/4/2024   Last telemedicine visit with prescribing clinician: Visit date not found   Next office visit with prescribing clinician: 12/4/2024     Additional details provided by patient: [facility] SAID NEW PRESCRIPTION WOULD NEED TO BE CALLED IN BECAUSE DR. BOOTH WAS NOT THE DRHollie ON THE ORIGINAL.  PATIENT IS ASKING FOR A 90 DAY SUPPLY.      Does the patient have less than a 3 day supply:  [] Yes  [x] No    Would you like a call back once the refill request has been completed: [] Yes [] No    If the office needs to give you a call back, can they leave a voicemail: [] Yes [] No    Kyler Hood Rep   07/09/24 10:39 EDT

## 2024-07-09 NOTE — TELEPHONE ENCOUNTER
Hub staff attempted to follow warm transfer process and was unsuccessful     Caller: Sravanthi Burns    Relationship to patient: Self    Best call back number: [phone]    Patient is needing: PT WANTEDTO CONFIRM TIMEFRAME FOR LABS, ON MYCHART IT STATES ANOTHER DRAW IN 2 MOS, BUT OFC STATED 2 WEEKS. PLEASE CALL PT TO ADV AND SENA AS NEEDED.

## 2024-07-10 ENCOUNTER — PREP FOR SURGERY (OUTPATIENT)
Dept: OTHER | Facility: HOSPITAL | Age: 74
End: 2024-07-10
Payer: MEDICARE

## 2024-07-10 ENCOUNTER — HOSPITAL ENCOUNTER (OUTPATIENT)
Dept: SURGERY | Facility: HOSPITAL | Age: 74
Discharge: HOME | End: 2024-07-10
Payer: MEDICARE

## 2024-07-10 ENCOUNTER — OFFICE VISIT (OUTPATIENT)
Dept: SURGERY | Facility: CLINIC | Age: 74
End: 2024-07-10
Payer: MEDICARE

## 2024-07-10 VITALS
DIASTOLIC BLOOD PRESSURE: 78 MMHG | SYSTOLIC BLOOD PRESSURE: 130 MMHG | RESPIRATION RATE: 17 BRPM | OXYGEN SATURATION: 98 % | WEIGHT: 150 LBS | HEIGHT: 58 IN | HEART RATE: 80 BPM | BODY MASS INDEX: 31.49 KG/M2

## 2024-07-10 DIAGNOSIS — Z17.0 MALIGNANT NEOPLASM OF OVERLAPPING SITES OF RIGHT BREAST IN FEMALE, ESTROGEN RECEPTOR POSITIVE: Primary | ICD-10-CM

## 2024-07-10 DIAGNOSIS — C50.811 MALIGNANT NEOPLASM OF OVERLAPPING SITES OF RIGHT BREAST IN FEMALE, ESTROGEN RECEPTOR POSITIVE: Primary | ICD-10-CM

## 2024-07-10 DIAGNOSIS — R92.8 ABNORMAL MRI, BREAST: Primary | ICD-10-CM

## 2024-07-10 RX ORDER — MULTIPLE VITAMINS W/ MINERALS TAB 9MG-400MCG
1 TAB ORAL DAILY
COMMUNITY

## 2024-07-10 RX ORDER — CRANBERRY FRUIT EXTRACT 650 MG
CAPSULE ORAL
COMMUNITY

## 2024-07-10 RX ORDER — ATENOLOL 50 MG/1
50 TABLET ORAL DAILY
Qty: 90 TABLET | Refills: 2 | Status: SHIPPED | OUTPATIENT
Start: 2024-07-10

## 2024-07-10 RX ORDER — ESTRADIOL 0.1 MG/G
2 CREAM VAGINAL DAILY
COMMUNITY

## 2024-07-10 NOTE — PROGRESS NOTES
[facility]     Referring Provider: Maritza Cuellar MD     Chief complaint: Newly diagnosed breast cancer    Subjective    HPI: Ms. Sravanthi Burns is a 74 yo woman, seen at the request of Dr. Maritaz Cuellar, for a new diagnosis of right breast cancer. This was initially detected as an imaging abnormality on routine screening. Her work-up is detailed in the oncologic history below. Prior to the biopsy, she denies any breast lumps, pain, skin changes, or nipple discharge. She denies any prior history of abnormal mammograms or breast biopsies.     She was recently seen by Dr. Haley for leukocytosis and thrombocytosis to rule out a myeloproliferative disorder.  Additional studies are still pending.  She has been followed in a hormone clinic for some time and has been on progesterone, estrogen, and testosterone for the past 20 years.  Over the past 6 months, she actually has been weaning herself because she was worried it was contributing to hair loss.  Her last pellets were placed in February and she has not taking any patches or pills since April.  Her 2 brothers and a paternal uncle had prostate cancer.  A paternal aunt had colon cancer. She denies any family history of breast or ovarian cancer. She was joined today in clinic by her .       Oncology/Hematology History   Malignant neoplasm of overlapping sites of right breast in female, estrogen receptor positive   6/1/2023 Imaging    Screening MMG with V[facility] ([facility]):  Heterogeneously dense.  There are multiple stable oval masses of varying size seen in both breasts. No suspicious masses, suspicious microcalcifications or architectural distortions are identified. There has been no significant change from the prior exam(s).  BI-RADS 2: Benign     6/3/2024 Initial Diagnosis    Malignant neoplasm of overlapping sites of right breast in female, estrogen receptor positive     6/3/2024 Imaging    Screening MMG with [facility]ick ([facility]):  Heterogeneously dense.  1. There is an  isodense, round mass measuring 14 mm with circumscribed margins seen in the middle one third upper outer region of the right breast. Remaining nodular parenchyma unchanged.  2. There is an isodense, oval mass with circumscribed margins seen in the upper outer region of the left breast. There are no significant changes from the prior exam(s). the parenchyma includes stable nodular asymmetries. No suspicious masses, suspicious microcalcifications or areas of architectural distortion are identified.  BI-RADS 0: Incomplete     6/5/2024 Imaging    Right Breast Ultrasound ([facility]):  1. On the present examination, there Is an oval anechoic septate cyst with well defined, thin margins measuring 15 mm in the right breast at 9 o'clock located 4 cm from the nipple. No internal vascularity identified by Doppler.  2. There is an irregular solid mass with indistinct margins measuring 14 x 13 x 12 mm seen in the 9:30 o'clock region of the right breast located 5 cm from the nipple. This is located adjacent to the cyst described above. No enlarged axillary lymph nodes are identified.  3. There is an oval solid mass with partially defined margins measuring 8 x 4 x 6 mm seen in the right breast at 9 o'clock located 4 cm from the nipple. This is also located adjacent to the cyst described above.  Addendum:     ADDENDED REPORT  06/21/2024 at 12:41:47  Please note supplemental images of the lateral right breast were obtained prior to biopsy, which demonstrated the masses labeled “9:00” and “9:30” are adjacent and contiguous therefore were labeled and biopsied as one site. An additional irregular elongated solid mass measuring 15 x 4 x 13 mm was identified at the 10:30 position 3 cmfn. This mass projects 4 to 5 cm anterior to the 9:00/9:30 masses and was biopsied as the second site.  BI-RADS 4C: Suspicious     6/21/2024 Biopsy    Right Breast, US-Guided Biopsy x 2 ([facility]):    1.  Right breast, 9-930, ultrasound-guided biopsies for a mass (Q  clip): INVASIVE MAMMARY CARCINOMA, NO SPECIAL TYPE (INVASIVE DUCTAL CARCINOMA).               -Histologic grade: Chandler histologic score II (tubules = 3, nuclei = 2, mitosis = 1).               -Base of carcinoma involves multiple tissue cores, measuring up to 11 mm maximally.               -No definitive in situ component identified.               -No lymphovascular nor perineural invasion identified.     ER positive (%, strong)  NV positive (61-70%, moderate)  HER2 negative (IHC 2+; FISH copy #2.9, ratio 1.4)  Ki-67 17%    2.  Right breast, 10:00, ultrasound-guided biopsies for a mass (vision clip):               -Hyalinized fibroadenoma.               -Focal microcalcification associated with benign breast duct.               -No atypical hyperplasia, in situ nor invasive carcinoma identified.     7/7/2024 Imaging    Bilateral Breast MRI ([facility]):  A mass is noted in the right breast at 9-9:30, 4 to 5 cm from the nipple with a central signal void artifact consistent with the Q clip which measures approximately 1.7 cm from anterior to posterior, 1.3 cm from medial to lateral, and 1.4 cm from superior to inferior. This is 1.8 cm deep to the lateral skin surface. This is consistent with the biopsy-proven invasive ductal carcinoma. At 10:00, 2 to 3 cm from the nipple, there is postbiopsy change with a central signal void artifact measuring 11 x 12 x 7 mm consistent with the biopsy-proven hyalinized fibroadenoma. At approximately 12:00 in the right breast, 6 cm from the nipple, there is a 5 mm focus of masslike enhancement. Otherwise, no additional areas worrisome masslike nor non-mass-like enhancement is noted in the right breast.  Within the left breast in the upper outer quadrant, 2 cm from the nipple, there is a 5 mm area of focal masslike enhancement. Otherwise, no worrisome masslike nor non-mass-like enhancement is noted in the left breast.  BI-RADS 4: Suspicious.         Review of Systems    Constitutional:  Negative for appetite change, chills, diaphoresis, fatigue, fever and unexpected weight change.   HENT:   Negative for hearing loss, lump/mass, mouth sores, nosebleeds, sore throat, tinnitus, trouble swallowing and voice change.    Eyes:  Positive for eye problems. Negative for icterus.   Respiratory:  Positive for shortness of breath (only with exertion). Negative for chest tightness, cough, hemoptysis and wheezing.    Cardiovascular:  Negative for chest pain, leg swelling and palpitations.   Gastrointestinal:  Positive for abdominal pain and diarrhea. Negative for abdominal distention, blood in stool, constipation, nausea, rectal pain and vomiting.   Endocrine: Negative for hot flashes.   Genitourinary:  Negative for bladder incontinence, difficulty urinating, dyspareunia, dysuria, frequency, hematuria, menstrual problem, nocturia, pelvic pain, vaginal bleeding and vaginal discharge.    Musculoskeletal:  Positive for back pain. Negative for arthralgias, flank pain, gait problem, myalgias, neck pain and neck stiffness.   Skin:  Negative for itching, rash and wound.   Neurological:  Negative for dizziness, extremity weakness, gait problem, headaches, light-headedness, numbness, seizures and speech difficulty.   Hematological:  Negative for adenopathy. Does not bruise/bleed easily.   Psychiatric/Behavioral:  Negative for confusion, decreased concentration, depression, sleep disturbance and suicidal ideas. The patient is nervous/anxious.        Medications:    Current Outpatient Medications:     Acetaminophen (TYLENOL 8 HOUR PO), Take  by mouth As Needed., Disp: , Rfl:     allopurinol (ZYLOPRIM) 300 MG tablet, Take 1 tablet by mouth Daily., Disp: , Rfl:     atenolol (TENORMIN) 50 MG tablet, TAKE 1 TABLET BY MOUTH DAILY, Disp: 30 tablet, Rfl: 0    atorvastatin (LIPITOR) 20 MG tablet, Take 1 tablet by mouth Every Night., Disp: 90 tablet, Rfl: 2    Cholecalciferol (VITAMIN D3) 5000 units chewable  "tablet, Chew 5,000 Units Daily., Disp: , Rfl:     DHEA 25 MG capsule, , Disp: , Rfl:     empagliflozin (Jardiance) 10 MG tablet tablet, Take 1 tablet by mouth Daily., Disp: , Rfl:     estradiol (ESTRACE) 0.1 MG/GM vaginal cream, Insert 2 g into the vagina Daily., Disp: , Rfl:     furosemide (LASIX) 40 MG tablet, Take 1 tablet by mouth Daily., Disp: 90 tablet, Rfl: 2    glucose blood test strip, Use to test glucose once daily. Dx DM 2 E11.9, Disp: 100 each, Rfl: 11    glucose blood test strip, , Disp: , Rfl:     imipramine (TOFRANIL) 25 MG tablet, Take 3 tablets by mouth Every Night., Disp: 270 tablet, Rfl: 2    ketoconazole (NIZORAL) 2 % shampoo, , Disp: , Rfl:     levothyroxine (SYNTHROID, LEVOTHROID) 100 MCG tablet, Take 1 tablet by mouth Daily., Disp: 90 tablet, Rfl: 3    multivitamin with minerals (ONE-A-DAY WOMENS PO), Take 1 tablet by mouth Daily., Disp: , Rfl:     Nutritional Supplements (Silica) 12.5 MG capsule, , Disp: , Rfl:     omeprazole (priLOSEC) 40 MG capsule, Take 1 capsule by mouth Daily., Disp: 90 capsule, Rfl: 3    Peppermint Oil (IBgard) 90 MG capsule controlled-release, , Disp: , Rfl:     Probiotic Product (PROBIOTIC PO), Take  by mouth., Disp: , Rfl:     Probiotic Product capsule, , Disp: , Rfl:     Semaglutide, 2 MG/DOSE, (OZEMPIC) 8 MG/3ML solution pen-injector, Inject 2 mg under the skin into the appropriate area as directed 1 (One) Time Per Week., Disp: 3 mL, Rfl: 5    valsartan (DIOVAN) 160 MG tablet, TAKE 2 TABLETS BY MOUTH DAILY, Disp: 90 tablet, Rfl: 2      Allergies   Allergen Reactions    Other Dermatitis, Itching and Swelling     \"surgical glue\"    Wound Dressing Adhesive Itching    Duloxetine Other (See Comments)     Other reaction(s): Decreased libido  DECREASED LIBIDO    Erythromycin Nausea Only    Formaldehyde Itching    Minocycline Swelling     GENERALIZED    Nsaids Other (See Comments)     Avoids due to CKD     Tea Tree Oil Other (See Comments)     Found during allergy test    " Venlafaxine Other (See Comments)     Other reaction(s): Decreased libido  DECREASED LIBIDO.       Past Medical History:   Diagnosis Date    Anxiety     Arthritis     Cardiomegaly     MILD    Cataract     Chronic kidney disease     Colon polyps     FOLLOWED BY DR. LUCY HILL    DDD (degenerative disc disease), lumbar     Diabetes mellitus 2021    Disease of thyroid gland     HYPOTHYROIDISM    Diverticulitis of colon     Elevated cholesterol     Excessive sleepiness     Fatty liver     GERD (gastroesophageal reflux disease)     Gout     Hard to intubate     History of cardiomegaly     History of leukocytosis     History of vitamin D deficiency     Hot flashes     HPV in female     Hyperlipidemia     Hypertension     Hypothyroidism     Insomnia     Leiomyoma     Low back pain     Lumbar radiculopathy     Lung disease     Obesity     DELIA (obstructive sleep apnea)     Panic disorder     Plantar fasciitis     Postmenopausal HRT (hormone replacement therapy)     PROGESTERONE AND ESTRADIAL    Pulmonary nodules     RIGHT MIDDLE LOBE    Renal insufficiency     Rosacea     Steatosis of liver     Tachycardia     Type 2 diabetes mellitus 2020       Past Surgical History:   Procedure Laterality Date    BARIATRIC SURGERY  2006    Removed lapband 2010    CHOLECYSTECTOMY N/A 03/12/2002    WITH CHOLANGIOGRAM, DR. LYNNETTE CHAVEZ AT [facility]    COLONOSCOPY N/A 10/04/2016    4 SESSILE TUBULAR ADENOMA POLYPS IN HEPATIC FLEXURE, 1 SESSILE TUBULAR ADENOMA AND 1 HYPERPLASTIC POLYP IN RECTUM, 6 MM SESSILE HYPERPLASTIC POLYP IN SIGMOID, 2 SESSILE HYPERPLASTI POLYPS IN RECTUM, MULTIPLE SMALL AND LARGE DIVERTICULA, RESCOPE IN 3 YRS, DR. LUCY HILL AT [facility]    COLONOSCOPY N/A 08/19/2003    ENTIRE COLON WNL, DR. ARIAS THOMASON AT [facility]    COLONOSCOPY N/A 10/07/2008    MULTIPLE LARGE MOUTHED DIVERTICULA IN SIGMOID, RESCOPE IN 5 YRS, DR. ARIAS THOMASON AT [facility]    COLONOSCOPY N/A 06/21/2011    DIVERTICULOSIS, MILD COLONIC SPASM, RESCOPE IN 5 YRS,   ARIAS THOMASON AT [facility]    COLONOSCOPY N/A 10/10/2019    5 MM HYPERPLASTIC POLYP IN CECUM, 5 MM HYPERPLASTIC POLYP IN ASCENDING, 5 MM HYPERPLASTIC POLYP IN TRANSVERSE, 5 MM HYPERPLASTIC POLYP IN DESCENDING, RESCOPE IN 3 YRS, DR. LUCY HILL AT [facility]    COLONOSCOPY N/A 11/29/2022    Procedure: COLONOSCOPY to cecum and TI;  with biopsies, cold bx polyps,;  Surgeon: Kathy Araya MD;  Location:  [facility];  Service: Gastroenterology;  Laterality: N/A;  pre:  Diarrhea, left lower quadrant pain, abnormal CT scan of the sigmoid colon  post:  polyps, diverticulosis, abnormal colon mucosa, hemorrhoids    COSMETIC SURGERY N/A 1995    ABDOMINOPLASTY AND LIPOSUCTION, DR. MAUREEN [facility]ATERHOUSE    COSMETIC SURGERY Bilateral 1997    ARM REDUCTION, BRACHIOPLASTY, DR. MAUREEN WATERHOUSE    COSMETIC SURGERY Bilateral     UPPER EYELID BLEPHAROPLASTY    COSMETIC SURGERY N/A 10/13/2000    LIPECTOMY OF ABDOMINAL WALL, DR. MAUREEN [facility]ATERHOUSE    CYSTOSCOPY N/A 03/19/2014    DR. JERMAINE MURRIETA AT [facility]    DIAGNOSTIC LAPAROSCOPY N/A 06/04/2010    DEBRIDEMENT OF ABDOMINAL WALL, DR. SILVERIO AT [facility]    DILATATION AND CURETTAGE N/A 02/2003    ENDOSCOPY N/A 01/13/2006    REFLUX ESOPHAGITIS, OTHERWISE WNL, DR. JOSE J WELLS AT [facility]    ENDOSCOPY N/A 11/29/2022    Procedure: ESOPHAGOGASTRODUODENOSCOPY iwth biopsies;  Surgeon: Kathy Araya MD;  Location: [facility];  Service: Gastroenterology;  Laterality: N/A;  Pre:  epigastric pain  post:  gastritis, esophagitis,     GASTRIC BANDING REMOVAL N/A 07/2011    GASTRIC PORT CHANGE N/A     DR. SILVERIO AT [facility]    HERNIA REPAIR N/A 03/12/2002    VENTRAL HERNIA REPAIR, DR. KAITLYNN CHAVEZ AT [facility]    LAPAROSCOPIC GASTRIC BANDING N/A 02/21/2017    DR. SILVERIO AT [facility]    LAPAROSCOPIC LYSIS OF ADHESIONS N/A 11/05/2018    DR. LORI HILL AT [facility]    SHOULDER ARTHROSCOPY W/ LABRAL REPAIR Left     AND DEBRIDEMENT OF ROTATOR CUFF    TENSION FREE  VAGINAL TAPING WITH MINI ARC SLING N/A 2004    DR. JERMAINE MURRIETA AT [facility]    TUBAL ABDOMINAL LIGATION Bilateral 1982    UPPER GASTROINTESTINAL ENDOSCOPY         Family History   Problem Relation Age of Onset    Heart murmur Mother     Hyperlipidemia Mother     Heart disease Mother     Hypertension Mother     Alcohol abuse Father     Liver disease Father     Cirrhosis Father     COPD Father     Liver cancer Father     Hypertension Sister     Heart disease Sister     Skin cancer Sister     Heart disease Brother     Basal cell carcinoma Brother     Hypertension Brother     Diabetes Brother     Gout Brother     Prostate cancer Brother     Obesity Brother     Skin cancer Brother     Prostate cancer Brother     Cancer Maternal Grandmother     Suicidality Paternal Grandfather     Valvular heart disease Daughter     No Known Problems Son     Prostate cancer Maternal Uncle     Colon cancer Paternal Aunt        Social History     Socioeconomic History    Marital status:      Spouse name: Lincoln    Number of children: 2   Tobacco Use    Smoking status: Former     Current packs/day: 0.00     Average packs/day: 0.5 packs/day for 20.0 years (10.0 ttl pk-yrs)     Types: Cigarettes     Start date: 1972     Quit date: 1992     Years since quittin.5    Smokeless tobacco: Never   Vaping Use    Vaping status: Never Used   Substance and Sexual Activity    Alcohol use: Yes     Comment: Do not drink daily or weekly    Drug use: No    Sexual activity: Yes     Partners: Male     Birth control/protection: Post-menopausal, Tubal ligation     Patient drinks 4 servings of caffeine per day.       GYNECOLOGIC HISTORY:   . P: 2. AB: 0.  Last menstrual period:   Age at menarche: 12  Age at first childbirth: 20  Lactation/How long: n/a  Age at menopause: 52  Total years of oral contraceptive use: 0  Total years of hormone replacement therapy: 20      Objective   PHYSICAL EXAMINATION:   Vitals:    07/10/24 0916   BP:  130/78   Pulse: 80   Resp: 17   SpO2: 98%     ECOG 0 - Asymptomatic  General: NAD, well appearing  Psych: a&o x 3, normal mood and affect  Eyes: EOMI, no scleral icterus  ENMT: neck supple without masses or thyromegaly, mucus membranes moist  Resp: normal effort, CTAB  CV: RRR, no murmurs, no edema  GI: soft, NT, ND  MSK: normal gait, normal ROM in bilateral shoulders  Lymph nodes: no cervical, supraclavicular or axillary lymphadenopathy  Breast: symmetric, moderate size, grade 3 ptosis  Right: No visible abnormalities on inspection while seated, with arms raised or hands on hips.  At 9:00, 6 cm FN, there is a 1.5 cm nodule.  No skin changes or nipple abnormalities.  Left: No visible abnormalities on inspection while seated, with arms raised or hands on hips. No masses, skin changes, or nipple abnormalities.    Right breast, in-office ultrasound: At 9:00, 6 cm FN, hypoechoic mass with biopsy clip is visualized about 1 cm deep to the skin.  A cyst is abutting the mass laterally.       I have independently reviewed her imaging and here are my findings:   In the right lateral breast, there is an irregular mass that measures 14 mm on ultrasound and 17 mm on MRI (11 mm of invasive on core, marked with Q clip). Immediately adjacent/contiguous with the mass laterally is a cystic structure.  Additionally in the right breast at 12:00 on MRI there is a 5 mm enhancing mass.  This has not been biopsied yet.  At 1030 in the right breast, there is a biopsy proven fibroadenoma marked with a globe clip.  In the upper outer left breast on MRI, there is a 5 mm enhancing mass.  This has not been biopsied yet.      Assessment & Plan   Assessment:   1. 73 y.o. F with a new diagnosis of right breast cancer: Intermediate grade, invasive ductal carcinoma, ER/WA positive, Her2 negative; clinical T1cN0, anatomic stage IA, prognostic stage IA.    2.  She has additional suspicious area in the right breast on MRI that has not been biopsied  yet.  3.  She has an additional suspicious area in the left breast on MRI that has not been biopsied yet.  4.  She has a family history of prostate cancer and colon cancer.  5.  History of chronic leukocytosis currently being evaluated by hematology.    Discussion:  I had an extensive discussion with the patient and her family about the nature of her breast cancer diagnosis. We reviewed the components of breast tissue including ducts and lobules. We reviewed her pathology report in detail. We reviewed breast cancer histology, including stage, grade, ER/NJ receptors, HER2 receptors and how this applies to her diagnosis. We reviewed the basics of systemic and local/regional management of breast cancer.     We discussed that in her case, systemic treatment would likely involve endocrine therapy. She will discuss this further with medical oncology. We reviewed potential surgical treatments to include partial mastectomy, mastectomy, sentinel lymph node biopsy and axillary node dissection and discussed the rationale associated with each approach. Regarding radiation therapy, we discussed that radiation is indicated in most cases of breast conservation and in only limited circumstances following mastectomy. We discussed that the primary goal of adjuvant radiation is to decrease the likelihood of local recurrence.     We discussed the additional bilateral breast findings on MRI.  These sites require additional biopsies before finalizing surgical plan.  She would prefer breast conservation if possible. Axillary staging will be discussed in more detail at subsequent appointments.  She is over 70, however the cancer is a T1c and intermediate grade, so I would favor axillary staging.    We discussed that most breast cancer is not hereditary, however given her family history of prostate cancer, she qualifies for genetic testing.  This was done today.    NAPBC statement:  The patient's clinical stage is documented as above. This  was discussed with the patient prior to initiation of treatment. All available pathology reports were discussed with the patient today. All treatment decisions were made via shared decision making with the patient. This patient was evaluated for appropriate ancillary referrals including, pre-treatment functional assessment, exercise program, nutrition program, genetics, and lymphedema clinic. Barriers to effective and efficient care are/will be evaluated by the nurse navigator.     Plan:  -F/u genetic testing. I will call her with results.  -Bilateral breast MRI guided biopsies.  Follow-up after biopsies to finalize the surgical plan.  I will transfer her care to Dr. Jason since I will be going on maternity leave.  -Follow-up second opinion at [facility].    Eveline Velasco MD    I spent 80 minutes caring for Sravanthi on this date of service. This time includes time spent by me in the following activities: preparing for the visit, performing a medically appropriate examination and/or evaluation , counseling and educating the patient/family/caregiver, ordering medications, tests, or procedures, referring and communicating with other health care professionals , documenting information in the medical record, and independently interpreting results and communicating that information with the patient/family/caregiver.      CC:  MD Leon Espinoza MD Lauren Lewis, MD

## 2024-07-10 NOTE — LETTER
July 10, 2024       No Recipients    Patient: Sravanthi Burns   YOB: 1950   Date of Visit: 7/10/2024     Dear Maritza Cuellar MD:       Thank you for referring Sravanthi Burns to me for evaluation. Below are the relevant portions of my assessment and plan of care.    If you have questions, please do not hesitate to call me. I look forward to following Sravanthi along with you.         Sincerely,        Eveline Velasco MD        CC:   No Recipients    Eveline Velasco MD  07/10/24 1054  Sign when Signing Visit  [facility]     Referring Provider: Maritza Cuellar MD     Chief complaint: Newly diagnosed breast cancer    Subjective   HPI: Ms. Sravanthi Burns is a 72 yo woman, seen at the request of Dr. Maritza Cuellar, for a new diagnosis of right breast cancer. This was initially detected as an imaging abnormality on routine screening. Her work-up is detailed in the oncologic history below. Prior to the biopsy, she denies any breast lumps, pain, skin changes, or nipple discharge. She denies any prior history of abnormal mammograms or breast biopsies.     She was recently seen by Dr. Haley for leukocytosis and thrombocytosis to rule out a myeloproliferative disorder.  Additional studies are still pending.  She has been followed in a hormone clinic for some time and has been on progesterone, estrogen, and testosterone for the past 20 years.  Over the past 6 months, she actually has been weaning herself because she was worried it was contributing to hair loss.  Her last pellets were placed in February and she has not taking any patches or pills since April.  Her 2 brothers and a paternal uncle had prostate cancer.  A paternal aunt had colon cancer. She denies any family history of breast or ovarian cancer. She was joined today in clinic by her .       Oncology/Hematology History   Malignant neoplasm of overlapping sites of right breast in female, estrogen receptor positive   6/1/2023 Imaging     Screening MMG with [facility] ([facility]):  Heterogeneously dense.  There are multiple stable oval masses of varying size seen in both breasts. No suspicious masses, suspicious microcalcifications or architectural distortions are identified. There has been no significant change from the prior exam(s).  BI-RADS 2: Benign     6/3/2024 Initial Diagnosis    Malignant neoplasm of overlapping sites of right breast in female, estrogen receptor positive     6/3/2024 Imaging    Screening MMG with [facility]ick ([facility]):  Heterogeneously dense.  1. There is an isodense, round mass measuring 14 mm with circumscribed margins seen in the middle one third upper outer region of the right breast. Remaining nodular parenchyma unchanged.  2. There is an isodense, oval mass with circumscribed margins seen in the upper outer region of the left breast. There are no significant changes from the prior exam(s). the parenchyma includes stable nodular asymmetries. No suspicious masses, suspicious microcalcifications or areas of architectural distortion are identified.  BI-RADS 0: Incomplete     6/5/2024 Imaging    Right Breast Ultrasound ([facility]):  1. On the present examination, there Is an oval anechoic septate cyst with well defined, thin margins measuring 15 mm in the right breast at 9 o'clock located 4 cm from the nipple. No internal vascularity identified by Doppler.  2. There is an irregular solid mass with indistinct margins measuring 14 x 13 x 12 mm seen in the 9:30 o'clock region of the right breast located 5 cm from the nipple. This is located adjacent to the cyst described above. No enlarged axillary lymph nodes are identified.  3. There is an oval solid mass with partially defined margins measuring 8 x 4 x 6 mm seen in the right breast at 9 o'clock located 4 cm from the nipple. This is also located adjacent to the cyst described above.  Addendum:     ADDENDED REPORT  06/21/2024 at 12:41:47  Please note supplemental images of the lateral right breast were  obtained prior to biopsy, which demonstrated the masses labeled “9:00” and “9:30” are adjacent and contiguous therefore were labeled and biopsied as one site. An additional irregular elongated solid mass measuring 15 x 4 x 13 mm was identified at the 10:30 position 3 cmfn. This mass projects 4 to 5 cm anterior to the 9:00/9:30 masses and was biopsied as the second site.  BI-RADS 4C: Suspicious     6/21/2024 Biopsy    Right Breast, US-Guided Biopsy x 2 ([facility]):    1.  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               -Histologic grade: Runnells histologic score II (tubules = 3, nuclei = 2, mitosis = 1).               -Base of carcinoma involves multiple tissue cores, measuring up to 11 mm maximally.               -No definitive in situ component identified.               -No lymphovascular nor perineural invasion identified.     ER positive (%, strong)  MS positive (61-70%, moderate)  HER2 negative (IHC 2+; FISH copy #2.9, ratio 1.4)  Ki-67 17%    2.  Right breast, 10:00, ultrasound-guided biopsies for a mass (vision clip):               -Hyalinized fibroadenoma.               -Focal microcalcification associated with benign breast duct.               -No atypical hyperplasia, in situ nor invasive carcinoma identified.     7/7/2024 Imaging    Bilateral Breast MRI ([facility]):  A mass is noted in the right breast at 9-9:30, 4 to 5 cm from the nipple with a central signal void artifact consistent with the Q clip which measures approximately 1.7 cm from anterior to posterior, 1.3 cm from medial to lateral, and 1.4 cm from superior to inferior. This is 1.8 cm deep to the lateral skin surface. This is consistent with the biopsy-proven invasive ductal carcinoma. At 10:00, 2 to 3 cm from the nipple, there is postbiopsy change with a central signal void artifact measuring 11 x 12 x 7 mm consistent with the  biopsy-proven hyalinized fibroadenoma. At approximately 12:00 in the right breast, 6 cm from the nipple, there is a 5 mm focus of masslike enhancement. Otherwise, no additional areas worrisome masslike nor non-mass-like enhancement is noted in the right breast.  Within the left breast in the upper outer quadrant, 2 cm from the nipple, there is a 5 mm area of focal masslike enhancement. Otherwise, no worrisome masslike nor non-mass-like enhancement is noted in the left breast.  BI-RADS 4: Suspicious.         Review of Systems   Constitutional:  Negative for appetite change, chills, diaphoresis, fatigue, fever and unexpected weight change.   HENT:   Negative for hearing loss, lump/mass, mouth sores, nosebleeds, sore throat, tinnitus, trouble swallowing and voice change.    Eyes:  Positive for eye problems. Negative for icterus.   Respiratory:  Positive for shortness of breath (only with exertion). Negative for chest tightness, cough, hemoptysis and wheezing.    Cardiovascular:  Negative for chest pain, leg swelling and palpitations.   Gastrointestinal:  Positive for abdominal pain and diarrhea. Negative for abdominal distention, blood in stool, constipation, nausea, rectal pain and vomiting.   Endocrine: Negative for hot flashes.   Genitourinary:  Negative for bladder incontinence, difficulty urinating, dyspareunia, dysuria, frequency, hematuria, menstrual problem, nocturia, pelvic pain, vaginal bleeding and vaginal discharge.    Musculoskeletal:  Positive for back pain. Negative for arthralgias, flank pain, gait problem, myalgias, neck pain and neck stiffness.   Skin:  Negative for itching, rash and wound.   Neurological:  Negative for dizziness, extremity weakness, gait problem, headaches, light-headedness, numbness, seizures and speech difficulty.   Hematological:  Negative for adenopathy. Does not bruise/bleed easily.   Psychiatric/Behavioral:  Negative for confusion, decreased concentration, depression, sleep  disturbance and suicidal ideas. The patient is nervous/anxious.        Medications:    Current Outpatient Medications:   •  Acetaminophen (TYLENOL 8 HOUR PO), Take  by mouth As Needed., Disp: , Rfl:   •  allopurinol (ZYLOPRIM) 300 MG tablet, Take 1 tablet by mouth Daily., Disp: , Rfl:   •  atenolol (TENORMIN) 50 MG tablet, TAKE 1 TABLET BY MOUTH DAILY, Disp: 30 tablet, Rfl: 0  •  atorvastatin (LIPITOR) 20 MG tablet, Take 1 tablet by mouth Every Night., Disp: 90 tablet, Rfl: 2  •  Cholecalciferol (VITAMIN D3) 5000 units chewable tablet, Chew 5,000 Units Daily., Disp: , Rfl:   •  DHEA 25 MG capsule, , Disp: , Rfl:   •  empagliflozin (Jardiance) 10 MG tablet tablet, Take 1 tablet by mouth Daily., Disp: , Rfl:   •  estradiol (ESTRACE) 0.1 MG/GM vaginal cream, Insert 2 g into the vagina Daily., Disp: , Rfl:   •  furosemide (LASIX) 40 MG tablet, Take 1 tablet by mouth Daily., Disp: 90 tablet, Rfl: 2  •  glucose blood test strip, Use to test glucose once daily. Dx DM 2 E11.9, Disp: 100 each, Rfl: 11  •  glucose blood test strip, , Disp: , Rfl:   •  imipramine (TOFRANIL) 25 MG tablet, Take 3 tablets by mouth Every Night., Disp: 270 tablet, Rfl: 2  •  ketoconazole (NIZORAL) 2 % shampoo, , Disp: , Rfl:   •  levothyroxine (SYNTHROID, LEVOTHROID) 100 MCG tablet, Take 1 tablet by mouth Daily., Disp: 90 tablet, Rfl: 3  •  multivitamin with minerals (ONE-A-DAY WOMENS PO), Take 1 tablet by mouth Daily., Disp: , Rfl:   •  Nutritional Supplements (Silica) 12.5 MG capsule, , Disp: , Rfl:   •  omeprazole (priLOSEC) 40 MG capsule, Take 1 capsule by mouth Daily., Disp: 90 capsule, Rfl: 3  •  Peppermint Oil (IBgard) 90 MG capsule controlled-release, , Disp: , Rfl:   •  Probiotic Product (PROBIOTIC PO), Take  by mouth., Disp: , Rfl:   •  Probiotic Product capsule, , Disp: , Rfl:   •  Semaglutide, 2 MG/DOSE, (OZEMPIC) 8 MG/3ML solution pen-injector, Inject 2 mg under the skin into the appropriate area as directed 1 (One) Time Per Week.,  "Disp: 3 mL, Rfl: 5  •  valsartan (DIOVAN) 160 MG tablet, TAKE 2 TABLETS BY MOUTH DAILY, Disp: 90 tablet, Rfl: 2      Allergies   Allergen Reactions   • Other Dermatitis, Itching and Swelling     \"surgical glue\"   • Wound Dressing Adhesive Itching   • Duloxetine Other (See Comments)     Other reaction(s): Decreased libido  DECREASED LIBIDO   • Erythromycin Nausea Only   • Formaldehyde Itching   • Minocycline Swelling     GENERALIZED   • Nsaids Other (See Comments)     Avoids due to CKD    • Tea Tree Oil Other (See Comments)     Found during allergy test   • Venlafaxine Other (See Comments)     Other reaction(s): Decreased libido  DECREASED LIBIDO.       Past Medical History:   Diagnosis Date   • Anxiety    • Arthritis    • Cardiomegaly     MILD   • Cataract    • Chronic kidney disease    • Colon polyps     FOLLOWED BY DR. LUCY HILL   • DDD (degenerative disc disease), lumbar    • Diabetes mellitus 2021   • Disease of thyroid gland     HYPOTHYROIDISM   • Diverticulitis of colon    • Elevated cholesterol    • Excessive sleepiness    • Fatty liver    • GERD (gastroesophageal reflux disease)    • Gout    • Hard to intubate    • History of cardiomegaly    • History of leukocytosis    • History of vitamin D deficiency    • Hot flashes    • HPV in female    • Hyperlipidemia    • Hypertension    • Hypothyroidism    • Insomnia    • Leiomyoma    • Low back pain    • Lumbar radiculopathy    • Lung disease    • Obesity    • DELIA (obstructive sleep apnea)    • Panic disorder    • Plantar fasciitis    • Postmenopausal HRT (hormone replacement therapy)     PROGESTERONE AND ESTRADIAL   • Pulmonary nodules     RIGHT MIDDLE LOBE   • Renal insufficiency    • Rosacea    • Steatosis of liver    • Tachycardia    • Type 2 diabetes mellitus 2020       Past Surgical History:   Procedure Laterality Date   • BARIATRIC SURGERY  2006    Removed lapband 2010   • CHOLECYSTECTOMY N/A 03/12/2002    WITH CHOLANGIOGRAM, DR. LYNNETTE CHAVEZ AT [facility] "   • COLONOSCOPY N/A 10/04/2016    4 SESSILE TUBULAR ADENOMA POLYPS IN HEPATIC FLEXURE, 1 SESSILE TUBULAR ADENOMA AND 1 HYPERPLASTIC POLYP IN RECTUM, 6 MM SESSILE HYPERPLASTIC POLYP IN SIGMOID, 2 SESSILE HYPERPLASTI POLYPS IN RECTUM, MULTIPLE SMALL AND LARGE DIVERTICULA, RESCOPE IN 3 YRS, DR. LUCY HILL AT [facility]   • COLONOSCOPY N/A 08/19/2003    ENTIRE COLON WNL, DR. ARIAS THOMASON AT [facility]   • COLONOSCOPY N/A 10/07/2008    MULTIPLE LARGE MOUTHED DIVERTICULA IN SIGMOID, RESCOPE IN 5 YRS, DR. ARIAS THOMASON AT [facility]   • COLONOSCOPY N/A 06/21/2011    DIVERTICULOSIS, MILD COLONIC SPASM, RESCOPE IN 5 YRS, DR. ARIAS THOMASON AT [facility]   • COLONOSCOPY N/A 10/10/2019    5 MM HYPERPLASTIC POLYP IN CECUM, 5 MM HYPERPLASTIC POLYP IN ASCENDING, 5 MM HYPERPLASTIC POLYP IN TRANSVERSE, 5 MM HYPERPLASTIC POLYP IN DESCENDING, RESCOPE IN 3 YRS, DR. LUCY HILL AT [facility]   • COLONOSCOPY N/A 11/29/2022    Procedure: COLONOSCOPY to cecum and TI;  with biopsies, cold bx polyps,;  Surgeon: Kathy Araya MD;  Location: [facility] ENDOSCOPY;  Service: Gastroenterology;  Laterality: N/A;  pre:  Diarrhea, left lower quadrant pain, abnormal CT scan of the sigmoid colon  post:  polyps, diverticulosis, abnormal colon mucosa, hemorrhoids   • COSMETIC SURGERY N/A 1995    ABDOMINOPLASTY AND LIPOSUCTION, DR. MAUREEN WATERHOUSE   • COSMETIC SURGERY Bilateral 1997    ARM REDUCTION, BRACHIOPLASTY, DR. MAUREEN WATERHOUSE   • COSMETIC SURGERY Bilateral     UPPER EYELID BLEPHAROPLASTY   • COSMETIC SURGERY N/A 10/13/2000    LIPECTOMY OF ABDOMINAL WALL, DR. MAUREEN WATERHOUSE   • CYSTOSCOPY N/A 03/19/2014    DR. JERMAINE MURRIETA AT [facility]   • DIAGNOSTIC LAPAROSCOPY N/A 06/04/2010    DEBRIDEMENT OF ABDOMINAL WALL, DR. SILVERIO AT [facility]   • DILATATION AND CURETTAGE N/A 02/2003   • ENDOSCOPY N/A 01/13/2006    REFLUX ESOPHAGITIS, OTHERWISE WNL, DR. JOSE J WELLS AT [facility]   • ENDOSCOPY N/A 11/29/2022    Procedure: ESOPHAGOGASTRODUODENOSCOPY iwth biopsies;   Surgeon: Kathy Araya MD;  Location: [facility] ENDOSCOPY;  Service: Gastroenterology;  Laterality: N/A;  Pre:  epigastric pain  post:  gastritis, esophagitis,    • GASTRIC BANDING REMOVAL N/A 2011   • GASTRIC PORT CHANGE N/A     DR. SILVERIO AT [facility]   • HERNIA REPAIR N/A 2002    VENTRAL HERNIA REPAIR, DR. KAITLYNN CHAVEZ AT [facility]   • LAPAROSCOPIC GASTRIC BANDING N/A 2017    DR. SILVERIO AT [facility]   • LAPAROSCOPIC LYSIS OF ADHESIONS N/A 2018    DR. LORI HILL AT [place]   • SHOULDER ARTHROSCOPY W/ LABRAL REPAIR Left     AND DEBRIDEMENT OF ROTATOR CUFF   • TENSION FREE VAGINAL TAPING WITH MINI ARC SLING N/A 2004    DR. JERMAINE MURRIETA AT [facility]   • TUBAL ABDOMINAL LIGATION Bilateral    • UPPER GASTROINTESTINAL ENDOSCOPY         Family History   Problem Relation Age of Onset   • Heart murmur Mother    • Hyperlipidemia Mother    • Heart disease Mother    • Hypertension Mother    • Alcohol abuse Father    • Liver disease Father    • Cirrhosis Father    • COPD Father    • Liver cancer Father    • Hypertension Sister    • Heart disease Sister    • Skin cancer Sister    • Heart disease Brother    • Basal cell carcinoma Brother    • Hypertension Brother    • Diabetes Brother    • Gout Brother    • Prostate cancer Brother    • Obesity Brother    • Skin cancer Brother    • Prostate cancer Brother    • Cancer Maternal Grandmother    • Suicidality Paternal Grandfather    • Valvular heart disease Daughter    • No Known Problems Son    • Prostate cancer Maternal Uncle    • Colon cancer Paternal Aunt        Social History     Socioeconomic History   • Marital status:      Spouse name: Lincoln   • Number of children: 2   Tobacco Use   • Smoking status: Former     Current packs/day: 0.00     Average packs/day: 0.5 packs/day for 20.0 years (10.0 ttl pk-yrs)     Types: Cigarettes     Start date: 1972     Quit date: 1992     Years since quittin.5   • Smokeless  tobacco: Never   Vaping Use   • Vaping status: Never Used   Substance and Sexual Activity   • Alcohol use: Yes     Comment: Do not drink daily or weekly   • Drug use: No   • Sexual activity: Yes     Partners: Male     Birth control/protection: Post-menopausal, Tubal ligation     Patient drinks 4 servings of caffeine per day.       GYNECOLOGIC HISTORY:   . P: 2. AB: 0.  Last menstrual period:   Age at menarche: 12  Age at first childbirth: 20  Lactation/How long: n/a  Age at menopause: 52  Total years of oral contraceptive use: 0  Total years of hormone replacement therapy: 20      Objective  PHYSICAL EXAMINATION:   Vitals:    07/10/24 0916   BP: 130/78   Pulse: 80   Resp: 17   SpO2: 98%     ECOG 0 - Asymptomatic  General: NAD, well appearing  Psych: a&o x 3, normal mood and affect  Eyes: EOMI, no scleral icterus  ENMT: neck supple without masses or thyromegaly, mucus membranes moist  Resp: normal effort, CTAB  CV: RRR, no murmurs, no edema  GI: soft, NT, ND  MSK: normal gait, normal ROM in bilateral shoulders  Lymph nodes: no cervical, supraclavicular or axillary lymphadenopathy  Breast: symmetric, moderate size, grade 3 ptosis  Right: No visible abnormalities on inspection while seated, with arms raised or hands on hips.  At 9:00, 6 cm FN, there is a 1.5 cm nodule.  No skin changes or nipple abnormalities.  Left: No visible abnormalities on inspection while seated, with arms raised or hands on hips. No masses, skin changes, or nipple abnormalities.    Right breast, in-office ultrasound: At 9:00, 6 cm FN, hypoechoic mass with biopsy clip is visualized about 1 cm deep to the skin.  A cyst is abutting the mass laterally.       I have independently reviewed her imaging and here are my findings:   In the right lateral breast, there is an irregular mass that measures 14 mm on ultrasound and 17 mm on MRI (11 mm of invasive on core, marked with Q clip). Immediately adjacent/contiguous with the mass laterally is a  cystic structure.  Additionally in the right breast at 12:00 on MRI there is a 5 mm enhancing mass.  This has not been biopsied yet.  At 1030 in the right breast, there is a biopsy proven fibroadenoma marked with a globe clip.  In the upper outer left breast on MRI, there is a 5 mm enhancing mass.  This has not been biopsied yet.      Assessment & Plan  Assessment:   1. 73 y.o. F with a new diagnosis of right breast cancer: Intermediate grade, invasive ductal carcinoma, ER/WY positive, Her2 negative; clinical T1cN0, anatomic stage IA, prognostic stage IA.    2.  She has additional suspicious area in the right breast on MRI that has not been biopsied yet.  3.  She has an additional suspicious area in the left breast on MRI that has not been biopsied yet.  4.  She has a family history of prostate cancer and colon cancer.  5.  History of chronic leukocytosis currently being evaluated by hematology.    Discussion:  I had an extensive discussion with the patient and her family about the nature of her breast cancer diagnosis. We reviewed the components of breast tissue including ducts and lobules. We reviewed her pathology report in detail. We reviewed breast cancer histology, including stage, grade, ER/WY receptors, HER2 receptors and how this applies to her diagnosis. We reviewed the basics of systemic and local/regional management of breast cancer.     We discussed that in her case, systemic treatment would likely involve endocrine therapy. She will discuss this further with medical oncology. We reviewed potential surgical treatments to include partial mastectomy, mastectomy, sentinel lymph node biopsy and axillary node dissection and discussed the rationale associated with each approach. Regarding radiation therapy, we discussed that radiation is indicated in most cases of breast conservation and in only limited circumstances following mastectomy. We discussed that the primary goal of adjuvant radiation is to decrease  the likelihood of local recurrence.     We discussed the additional bilateral breast findings on MRI.  These sites require additional biopsies before finalizing surgical plan.  She would prefer breast conservation if possible. Axillary staging will be discussed in more detail at subsequent appointments.  She is over 70, however the cancer is a T1c and intermediate grade, so I would favor axillary staging.    We discussed that most breast cancer is not hereditary, however given her family history of prostate cancer, she qualifies for genetic testing.  This was done today.    NAPBC statement:  The patient's clinical stage is documented as above. This was discussed with the patient prior to initiation of treatment. All available pathology reports were discussed with the patient today. All treatment decisions were made via shared decision making with the patient. This patient was evaluated for appropriate ancillary referrals including, pre-treatment functional assessment, exercise program, nutrition program, genetics, and lymphedema clinic. Barriers to effective and efficient care are/will be evaluated by the nurse navigator.     Plan:  -F/u genetic testing. I will call her with results.  -Bilateral breast MRI guided biopsies.  Follow-up after biopsies to finalize the surgical plan.  I will transfer her care to Dr. Jason since I will be going on maternity leave.  -Follow-up second opinion at [facility].    Eveline Velasco MD    I spent 80 minutes caring for Sravanthi on this date of service. This time includes time spent by me in the following activities: preparing for the visit, performing a medically appropriate examination and/or evaluation , counseling and educating the patient/family/caregiver, ordering medications, tests, or procedures, referring and communicating with other health care professionals , documenting information in the medical record, and independently interpreting results and communicating that  information with the patient/family/caregiver.      CC:  MD Leon Espinoza MD Lauren Lewis, MD

## 2024-07-12 ENCOUNTER — PATIENT ROUNDING (BHMG ONLY) (OUTPATIENT)
Dept: SURGERY | Facility: CLINIC | Age: 74
End: 2024-07-12
Payer: MEDICARE

## 2024-07-12 NOTE — PROGRESS NOTES
July 12, 2024    Hello, may I speak with Sravanthi Burns?    My name is Cintia      I am  with [facility]  [address] [address] [phone].    Before we get started may I verify your date of birth? 1950    I am calling to officially welcome you to our practice and ask about your recent visit. Is this a good time to talk? yes    Tell me about your visit with us. What things went well?  I like everything Dr. Velasco did.        We're always looking for ways to make our patients' experiences even better. Do you have recommendations on ways we may improve?  Patient was confused about transfer of care due to maternity leave.    Overall were you satisfied with your first visit to our practice? yes       I appreciate you taking the time to speak with me today. Is there anything else I can do for you? no      Thank you, and have a great day.

## 2024-07-15 ENCOUNTER — TRANSCRIBE ORDERS (OUTPATIENT)
Dept: SURGERY | Facility: CLINIC | Age: 74
End: 2024-07-15
Payer: MEDICARE

## 2024-07-15 ENCOUNTER — TELEPHONE (OUTPATIENT)
Dept: SURGERY | Facility: CLINIC | Age: 74
End: 2024-07-15
Payer: MEDICARE

## 2024-07-15 DIAGNOSIS — C50.811 MALIGNANT NEOPLASM OF OVERLAPPING SITES OF RIGHT BREAST IN FEMALE, ESTROGEN RECEPTOR POSITIVE: Primary | ICD-10-CM

## 2024-07-15 DIAGNOSIS — Z17.0 MALIGNANT NEOPLASM OF OVERLAPPING SITES OF RIGHT BREAST IN FEMALE, ESTROGEN RECEPTOR POSITIVE: Primary | ICD-10-CM

## 2024-07-15 NOTE — TELEPHONE ENCOUNTER
Left message for pt to call back.. She had requested pre procedure medication for her MRI guided biopsies. Pt will need to ask for this at the time of her visit.  Radiologist prefers that the patient not be medicated prior to signing her consent, but she can ask for medication prior to her biopsy.  Pt will need a .     CMA

## 2024-07-16 ENCOUNTER — TELEPHONE (OUTPATIENT)
Dept: SURGERY | Facility: CLINIC | Age: 74
End: 2024-07-16
Payer: MEDICARE

## 2024-07-16 NOTE — TELEPHONE ENCOUNTER
Patient called to cancel all future care. She does not wish to be seen in our office.She is seeking care at [facility].     Message to Brylee to have MRI BX's canceled and follow up with Dr. Jason has been canceled.

## 2024-07-16 NOTE — TELEPHONE ENCOUNTER
Caller: LUCAS BLANCAS    Relationship to patient: SELF     Best call back number: [phone] (home) [phone] (work)      Patient is needing: PLEASE CANCEL ALL FUTURE APPTS 7.31.24 AND 8.6.24; I AM SEEKING CARE IN [place], [place].

## 2024-07-17 ENCOUNTER — PATIENT OUTREACH (OUTPATIENT)
Dept: OTHER | Facility: HOSPITAL | Age: 74
End: 2024-07-17
Payer: MEDICARE

## 2024-07-17 NOTE — PROGRESS NOTES
Referral received from Dr. Velasco office. Called Ms. Burns and left a message introducing myself and navigational services. Asked her to call me back at her convenience and left my contact information.      Ms. Burns called back and stated she is going to [facility] in [place] and has no needs from [facility] at this time.

## 2024-07-26 ENCOUNTER — APPOINTMENT (OUTPATIENT)
Dept: GENERAL RADIOLOGY | Facility: HOSPITAL | Age: 74
End: 2024-07-26
Payer: MEDICARE

## 2024-07-26 ENCOUNTER — HOSPITAL ENCOUNTER (EMERGENCY)
Facility: HOSPITAL | Age: 74
Discharge: HOME | End: 2024-07-26
Attending: EMERGENCY MEDICINE
Payer: MEDICARE

## 2024-07-26 VITALS
RESPIRATION RATE: 16 BRPM | DIASTOLIC BLOOD PRESSURE: 72 MMHG | SYSTOLIC BLOOD PRESSURE: 140 MMHG | OXYGEN SATURATION: 94 % | HEART RATE: 89 BPM | TEMPERATURE: 100 F

## 2024-07-26 DIAGNOSIS — R53.1 GENERALIZED WEAKNESS: Primary | ICD-10-CM

## 2024-07-26 DIAGNOSIS — U07.1 COVID-19: ICD-10-CM

## 2024-07-26 LAB
ALBUMIN SERPL-MCNC: 3.6 G/DL (ref 3.5–5.2)
ALBUMIN/GLOB SERPL: 1.2 G/DL
ALP SERPL-CCNC: 162 U/L (ref 39–117)
ALT SERPL W P-5'-P-CCNC: 18 U/L (ref 1–33)
ANION GAP SERPL CALCULATED.3IONS-SCNC: 12.2 MMOL/L (ref 5–15)
AST SERPL-CCNC: 22 U/L (ref 1–32)
BASOPHILS # BLD AUTO: 0.04 10*3/MM3 (ref 0–0.2)
BASOPHILS NFR BLD AUTO: 0.4 % (ref 0–1.5)
BILIRUB SERPL-MCNC: 0.5 MG/DL (ref 0–1.2)
BUN SERPL-MCNC: 21 MG/DL (ref 8–23)
BUN/CREAT SERPL: 14.7 (ref 7–25)
CALCIUM SPEC-SCNC: 10.2 MG/DL (ref 8.6–10.5)
CHLORIDE SERPL-SCNC: 98 MMOL/L (ref 98–107)
CO2 SERPL-SCNC: 23.8 MMOL/L (ref 22–29)
CREAT SERPL-MCNC: 1.43 MG/DL (ref 0.57–1)
DEPRECATED RDW RBC AUTO: 46.2 FL (ref 37–54)
EGFRCR SERPLBLD CKD-EPI 2021: 39.3 ML/MIN/1.73
EOSINOPHIL # BLD AUTO: 0.01 10*3/MM3 (ref 0–0.4)
EOSINOPHIL NFR BLD AUTO: 0.1 % (ref 0.3–6.2)
ERYTHROCYTE [DISTWIDTH] IN BLOOD BY AUTOMATED COUNT: 14.6 % (ref 12.3–15.4)
GLOBULIN UR ELPH-MCNC: 2.9 GM/DL
GLUCOSE SERPL-MCNC: 104 MG/DL (ref 65–99)
HCT VFR BLD AUTO: 42.1 % (ref 34–46.6)
HGB BLD-MCNC: 14.6 G/DL (ref 12–15.9)
IMM GRANULOCYTES # BLD AUTO: 0.06 10*3/MM3 (ref 0–0.05)
IMM GRANULOCYTES NFR BLD AUTO: 0.5 % (ref 0–0.5)
LYMPHOCYTES # BLD AUTO: 2.84 10*3/MM3 (ref 0.7–3.1)
LYMPHOCYTES NFR BLD AUTO: 25.9 % (ref 19.6–45.3)
MCH RBC QN AUTO: 30.3 PG (ref 26.6–33)
MCHC RBC AUTO-ENTMCNC: 34.7 G/DL (ref 31.5–35.7)
MCV RBC AUTO: 87.3 FL (ref 79–97)
MONOCYTES # BLD AUTO: 1.27 10*3/MM3 (ref 0.1–0.9)
MONOCYTES NFR BLD AUTO: 11.6 % (ref 5–12)
NEUTROPHILS NFR BLD AUTO: 6.73 10*3/MM3 (ref 1.7–7)
NEUTROPHILS NFR BLD AUTO: 61.5 % (ref 42.7–76)
NRBC BLD AUTO-RTO: 0 /100 WBC (ref 0–0.2)
PLATELET # BLD AUTO: 302 10*3/MM3 (ref 140–450)
PMV BLD AUTO: 9 FL (ref 6–12)
POTASSIUM SERPL-SCNC: 3.1 MMOL/L (ref 3.5–5.2)
PROT SERPL-MCNC: 6.5 G/DL (ref 6–8.5)
RBC # BLD AUTO: 4.82 10*6/MM3 (ref 3.77–5.28)
SARS-COV-2 RNA RESP QL NAA+PROBE: DETECTED
SODIUM SERPL-SCNC: 134 MMOL/L (ref 136–145)
WBC NRBC COR # BLD AUTO: 10.95 10*3/MM3 (ref 3.4–10.8)

## 2024-07-26 PROCEDURE — 99283 EMERGENCY DEPT VISIT LOW MDM: CPT

## 2024-07-26 PROCEDURE — 71045 X-RAY EXAM CHEST 1 VIEW: CPT

## 2024-07-26 PROCEDURE — 87635 SARS-COV-2 COVID-19 AMP PRB: CPT | Performed by: EMERGENCY MEDICINE

## 2024-07-26 PROCEDURE — 25810000003 LACTATED RINGERS SOLUTION: Performed by: EMERGENCY MEDICINE

## 2024-07-26 PROCEDURE — 80053 COMPREHEN METABOLIC PANEL: CPT | Performed by: EMERGENCY MEDICINE

## 2024-07-26 PROCEDURE — 85025 COMPLETE CBC W/AUTO DIFF WBC: CPT | Performed by: EMERGENCY MEDICINE

## 2024-07-26 RX ORDER — POTASSIUM CHLORIDE 750 MG/1
40 TABLET, FILM COATED, EXTENDED RELEASE ORAL ONCE
Status: COMPLETED | OUTPATIENT
Start: 2024-07-26 | End: 2024-07-26

## 2024-07-26 RX ADMIN — POTASSIUM CHLORIDE 40 MEQ: 750 TABLET, EXTENDED RELEASE ORAL at 10:01

## 2024-07-26 RX ADMIN — SODIUM CHLORIDE, POTASSIUM CHLORIDE, SODIUM LACTATE AND CALCIUM CHLORIDE 1000 ML: 600; 310; 30; 20 INJECTION, SOLUTION INTRAVENOUS at 08:06

## 2024-07-26 NOTE — ED NOTES
Patient to ER via car from home for covid+ patient has had s/s of covid x Tuesday after travel to [facility]

## 2024-07-26 NOTE — ED PROVIDER NOTES
EMERGENCY DEPARTMENT ENCOUNTER    Room Number:  25/25  PCP: Leon Layton MD  Historian: Patient      HPI:  Chief Complaint: COVID-positive fatigue  A complete HPI/ROS/PMH/PSH/SH/FH are unobtainable due to: None  Context: Sravanthi Burns is a 73 y.o. female who presents to the ED c/o COVID-positive.  Patient states she returned from vacation in [facility].  Patient states she started getting some symptoms on Tuesday.  Patient tested positive for COVID.  States she is gotten progressively worse since then.  Has had fatigue.  Has had some diarrhea.  Some shortness of breath and cough.  Has had headache.  Patient has taken antipyretic earlier today.            PAST MEDICAL HISTORY  Active Ambulatory Problems     Diagnosis Date Noted    Chronic kidney disease 03/22/2019    Acquired hypothyroidism 03/22/2019    Disease of lung 03/22/2019    Gastroesophageal reflux disease 03/22/2019    Hyperlipidemia 03/22/2019    Hypertension 03/22/2019    Obesity due to excess calories with serious comorbidity 05/10/2018    Panic disorder without agoraphobia 03/22/2019    Tachycardia 03/22/2019    Morbidly obese 03/22/2019    History of colon polyps 08/02/2019    Diarrhea 09/20/2022    Epigastric pain 09/20/2022    Abnormal CT scan, sigmoid colon 09/20/2022    Acute kidney injury 11/16/2022    Arthropathy of lumbar facet joint 03/26/2023    Lumbar spondylosis 03/26/2023    Chronic diarrhea 01/06/2023    Chronic low back pain 03/26/2023    Impingement syndrome of right shoulder region 10/24/2023    Lateral epicondylitis of left elbow 07/12/2022    Lumbar radiculopathy 03/26/2023    Pain of both shoulder joints 09/29/2022    Shoulder pain 09/29/2022    Stage 3b chronic kidney disease 08/11/2022    Subacromial bursitis of left shoulder joint 09/29/2022    Subacromial bursitis of right shoulder joint 09/29/2022    Type 2 diabetes mellitus with diabetic chronic kidney disease 11/16/2022    Type 2 diabetes mellitus without complication, without  long-term current use of insulin 08/11/2022    Mixed hyperlipidemia 11/16/2022    Benign hypertension with chronic kidney disease 11/16/2022    Essential hypertension 08/11/2022    Obesity 05/10/2018    Hepatic steatosis 10/26/2023    Bandemia 12/12/2023    Malignant neoplasm of overlapping sites of right breast in female, estrogen receptor positive 07/10/2024     Resolved Ambulatory Problems     Diagnosis Date Noted    No Resolved Ambulatory Problems     Past Medical History:   Diagnosis Date    Anxiety     Arthritis     Cardiomegaly     Cataract     Colon polyps     DDD (degenerative disc disease), lumbar     Diabetes mellitus 2021    Disease of thyroid gland     Diverticulitis of colon     Elevated cholesterol     Excessive sleepiness     Fatty liver     GERD (gastroesophageal reflux disease)     Gout     Hard to intubate     History of cardiomegaly     History of leukocytosis     History of vitamin D deficiency     Hot flashes     HPV in female     Hypothyroidism     Insomnia     Leiomyoma     Low back pain     Lung disease     DELIA (obstructive sleep apnea)     Panic disorder     Plantar fasciitis     Postmenopausal HRT (hormone replacement therapy)     Pulmonary nodules     Renal insufficiency     Rosacea     Steatosis of liver     Type 2 diabetes mellitus 2020         PAST SURGICAL HISTORY  Past Surgical History:   Procedure Laterality Date    BARIATRIC SURGERY  2006    Removed lapband 2010    CHOLECYSTECTOMY N/A 03/12/2002    WITH CHOLANGIOGRAM, DR. LYNNETTE CHAVEZ AT [facility]    COLONOSCOPY N/A 10/04/2016    4 SESSILE TUBULAR ADENOMA POLYPS IN HEPATIC FLEXURE, 1 SESSILE TUBULAR ADENOMA AND 1 HYPERPLASTIC POLYP IN RECTUM, 6 MM SESSILE HYPERPLASTIC POLYP IN SIGMOID, 2 SESSILE HYPERPLASTI POLYPS IN RECTUM, MULTIPLE SMALL AND LARGE DIVERTICULA, RESCOPE IN 3 YRS, DR. LUCY HILL AT [facility]    COLONOSCOPY N/A 08/19/2003    ENTIRE COLON WNL, DR. ARIAS THOMASON AT [facility]    COLONOSCOPY N/A 10/07/2008    MULTIPLE LARGE MOUTHED  DIVERTICULA IN SIGMOID, RESCOPE IN 5 YRS, DR. ARIAS THOMASON AT [facility]    COLONOSCOPY N/A 06/21/2011    DIVERTICULOSIS, MILD COLONIC SPASM, RESCOPE IN 5 YRS, DR. ARIAS THOMASON AT [facility]    COLONOSCOPY N/A 10/10/2019    5 MM HYPERPLASTIC POLYP IN CECUM, 5 MM HYPERPLASTIC POLYP IN ASCENDING, 5 MM HYPERPLASTIC POLYP IN TRANSVERSE, 5 MM HYPERPLASTIC POLYP IN DESCENDING, RESCOPE IN 3 YRS, DR. LUCY HILL AT [facility]    COLONOSCOPY N/A 11/29/2022    Procedure: COLONOSCOPY to cecum and TI;  with biopsies, cold bx polyps,;  Surgeon: Kathy Araya MD;  Location: [facility] ENDOSCOPY;  Service: Gastroenterology;  Laterality: N/A;  pre:  Diarrhea, left lower quadrant pain, abnormal CT scan of the sigmoid colon  post:  polyps, diverticulosis, abnormal colon mucosa, hemorrhoids    COSMETIC SURGERY N/A 1995    ABDOMINOPLASTY AND LIPOSUCTION, DR. MAUREEN [facility]HOUSE    COSMETIC SURGERY Bilateral 1997    ARM REDUCTION, BRACHIOPLASTY, DR. MAUREEN WATERHOUSE    COSMETIC SURGERY Bilateral     UPPER EYELID BLEPHAROPLASTY    COSMETIC SURGERY N/A 10/13/2000    LIPECTOMY OF ABDOMINAL WALL, DR. MAUREEN [facility]    CYSTOSCOPY N/A 03/19/2014    DR. JERMAINE MURRIETA AT [facility]    DIAGNOSTIC LAPAROSCOPY N/A 06/04/2010    DEBRIDEMENT OF ABDOMINAL WALL, DR. SILVERIO AT [facility]    DILATATION AND CURETTAGE N/A 02/2003    ENDOSCOPY N/A 01/13/2006    REFLUX ESOPHAGITIS, OTHERWISE WNL, DR. JOSE J WELLS AT [facility]    ENDOSCOPY N/A 11/29/2022    Procedure: ESOPHAGOGASTRODUODENOSCOPY iwth biopsies;  Surgeon: Kathy Araya MD;  Location: [facility] ENDOSCOPY;  Service: Gastroenterology;  Laterality: N/A;  Pre:  epigastric pain  post:  gastritis, esophagitis,     GASTRIC BANDING REMOVAL N/A 07/2011    GASTRIC PORT CHANGE N/A     DR. SILVERIO AT [facility]    HERNIA REPAIR N/A 03/12/2002    VENTRAL HERNIA REPAIR, DR. KAITLYNN CHAVEZ AT [facility]    LAPAROSCOPIC GASTRIC BANDING N/A 02/21/2017    DR. SILVERIO AT [facility]    LAPAROSCOPIC LYSIS OF  ADHESIONS N/A 2018    DR. LORI HILL AT [place]    SHOULDER ARTHROSCOPY W/ LABRAL REPAIR Left     AND DEBRIDEMENT OF ROTATOR CUFF    TENSION FREE VAGINAL TAPING WITH MINI ARC SLING N/A 2004    DR. JERMAINE MURRIETA AT [facility]    TUBAL ABDOMINAL LIGATION Bilateral 1982    UPPER GASTROINTESTINAL ENDOSCOPY           FAMILY HISTORY  Family History   Problem Relation Age of Onset    Heart murmur Mother     Hyperlipidemia Mother     Heart disease Mother     Hypertension Mother     Alcohol abuse Father     Liver disease Father     Cirrhosis Father     COPD Father     Liver cancer Father     Hypertension Sister     Heart disease Sister     Skin cancer Sister     Heart disease Brother     Basal cell carcinoma Brother     Hypertension Brother     Diabetes Brother     Gout Brother     Prostate cancer Brother     Obesity Brother     Skin cancer Brother     Prostate cancer Brother     Cancer Maternal Grandmother     Suicidality Paternal Grandfather     Valvular heart disease Daughter     No Known Problems Son     Prostate cancer Maternal Uncle     Colon cancer Paternal Aunt          SOCIAL HISTORY  Social History     Socioeconomic History    Marital status:      Spouse name: Lincoln    Number of children: 2   Tobacco Use    Smoking status: Former     Current packs/day: 0.00     Average packs/day: 0.5 packs/day for 20.0 years (10.0 ttl pk-yrs)     Types: Cigarettes     Start date: 1972     Quit date: 1992     Years since quittin.5    Smokeless tobacco: Never   Vaping Use    Vaping status: Never Used   Substance and Sexual Activity    Alcohol use: Yes     Comment: Do not drink daily or weekly    Drug use: No    Sexual activity: Yes     Partners: Male     Birth control/protection: Post-menopausal, Tubal ligation         ALLERGIES  Other, Wound dressing adhesive, Duloxetine, Erythromycin, Formaldehyde, Minocycline, Nsaids, Tea tree oil, and Venlafaxine        REVIEW OF SYSTEMS  Review of Systems   Headache  body aches cough congestion      PHYSICAL EXAM  ED Triage Vitals   Temp Heart Rate Resp BP SpO2   07/26/24 0746 07/26/24 0746 07/26/24 0746 07/26/24 0753 07/26/24 0746   100 °F (37.8 °C) 110 16 112/56 98 %      Temp src Heart Rate Source Patient Position BP Location FiO2 (%)   -- -- -- -- --              Physical Exam      GENERAL: no acute distress  HENT: nares patent  EYES: no scleral icterus  CV: regular rhythm, normal rate  RESPIRATORY: normal effort  ABDOMEN: soft  MUSCULOSKELETAL: no deformity  NEURO: alert, moves all extremities, follows commands  PSYCH:  calm, cooperative  SKIN: warm, dry    Vital signs and nursing notes reviewed.          LAB RESULTS  Recent Results (from the past 24 hour(s))   COVID-19,[facility] IN-HOUSE CEPHEID/MABLE NP SWAB IN TRANSPORT MEDIA 1 HR TAT - Swab, Nasopharynx    Collection Time: 07/26/24  7:59 AM    Specimen: Nasopharynx; Swab   Result Value Ref Range    COVID19 Detected (C) Not Detected - Ref. Range   Comprehensive Metabolic Panel    Collection Time: 07/26/24  8:04 AM    Specimen: Blood   Result Value Ref Range    Glucose 104 (H) 65 - 99 mg/dL    BUN 21 8 - 23 mg/dL    Creatinine 1.43 (H) 0.57 - 1.00 mg/dL    Sodium 134 (L) 136 - 145 mmol/L    Potassium 3.1 (L) 3.5 - 5.2 mmol/L    Chloride 98 98 - 107 mmol/L    CO2 23.8 22.0 - 29.0 mmol/L    Calcium 10.2 8.6 - 10.5 mg/dL    Total Protein 6.5 6.0 - 8.5 g/dL    Albumin 3.6 3.5 - 5.2 g/dL    ALT (SGPT) 18 1 - 33 U/L    AST (SGOT) 22 1 - 32 U/L    Alkaline Phosphatase 162 (H) 39 - 117 U/L    Total Bilirubin 0.5 0.0 - 1.2 mg/dL    Globulin 2.9 gm/dL    A/G Ratio 1.2 g/dL    BUN/Creatinine Ratio 14.7 7.0 - 25.0    Anion Gap 12.2 5.0 - 15.0 mmol/L    eGFR 39.3 (L) >60.0 mL/min/1.73   CBC Auto Differential    Collection Time: 07/26/24  8:04 AM    Specimen: Blood   Result Value Ref Range    WBC 10.95 (H) 3.40 - 10.80 10*3/mm3    RBC 4.82 3.77 - 5.28 10*6/mm3    Hemoglobin 14.6 12.0 - 15.9 g/dL    Hematocrit 42.1 34.0 - 46.6 %    MCV 87.3  79.0 - 97.0 fL    MCH 30.3 26.6 - 33.0 pg    MCHC 34.7 31.5 - 35.7 g/dL    RDW 14.6 12.3 - 15.4 %    RDW-SD 46.2 37.0 - 54.0 fl    MPV 9.0 6.0 - 12.0 fL    Platelets 302 140 - 450 10*3/mm3    Neutrophil % 61.5 42.7 - 76.0 %    Lymphocyte % 25.9 19.6 - 45.3 %    Monocyte % 11.6 5.0 - 12.0 %    Eosinophil % 0.1 (L) 0.3 - 6.2 %    Basophil % 0.4 0.0 - 1.5 %    Immature Grans % 0.5 0.0 - 0.5 %    Neutrophils, Absolute 6.73 1.70 - 7.00 10*3/mm3    Lymphocytes, Absolute 2.84 0.70 - 3.10 10*3/mm3    Monocytes, Absolute 1.27 (H) 0.10 - 0.90 10*3/mm3    Eosinophils, Absolute 0.01 0.00 - 0.40 10*3/mm3    Basophils, Absolute 0.04 0.00 - 0.20 10*3/mm3    Immature Grans, Absolute 0.06 (H) 0.00 - 0.05 10*3/mm3    nRBC 0.0 0.0 - 0.2 /100 WBC       Ordered the above labs and reviewed the results.        RADIOLOGY  XR Chest 1 View    Result Date: 7/26/2024  Portable chest radiograph  HISTORY: Shortness of breath  TECHNIQUE: Single AP portable radiograph of the chest  COMPARISON: None      FINDINGS AND IMPRESSION: No pulmonary consolidation, pleural effusion or pneumothorax is seen. Cardiac silhouette is mild to moderately enlarged. There appear to be right upper quadrant surgical clips.  This report was finalized on 7/26/2024 8:40 AM by Dr. Jared Blake M.D on Workstation: [id number]       Ordered the above noted radiological studies.  Chest x-ray independently interpreted by me and shows no evidence of pneumonia          PROCEDURES  Procedures            MEDICATIONS GIVEN IN ER  Medications   lactated ringers bolus 1,000 mL (0 mL Intravenous Stopped 7/26/24 1004)   potassium chloride (K-DUR,KLOR-CON) ER tablet 40 mEq (40 mEq Oral Given 7/26/24 1001)                   MEDICAL DECISION MAKING, PROGRESS, and CONSULTS    All labs have been independently reviewed by me.  All radiology studies have been reviewed by me and I have also reviewed the radiology report.   EKG's independently viewed and interpreted by me.  Discussion below  represents my analysis of pertinent findings related to patient's condition, differential diagnosis, treatment plan and final disposition.      Additional sources:  - Discussed/ obtained information from independent historians: None    - External (non-ED) record review: Patient seen by breast surgery 7/10/2024 for breast CA    - Chronic or social conditions impacting care: None    - Shared decision making: None      Orders placed during this visit:  Orders Placed This Encounter   Procedures    COVID PRE-OP / PRE-PROCEDURE SCREENING ORDER (NO ISOLATION) - Swab, Nasopharynx    COVID-19, [facility] IN-HOUSE CEPHEID/MABLE NP SWAB IN TRANSPORT MEDIA 1 HR TAT - Swab, Nasopharynx    XR Chest 1 View    Comprehensive Metabolic Panel    CBC Auto Differential    CBC & Differential         Additional orders considered but not ordered:  None        Differential diagnosis includes but is not limited to:    COVID-19 infection versus pneumonia versus CHF      Independent interpretation of labs, radiology studies, and discussions with consultants:  ED Course as of 07/26/24 1157   Fri Jul 26, 2024   0953 09:53 EDT  Patient presents for weakness and positive COVID.  Patient is positive here.  Chest x-ray shows no evidence of pneumonia.  Patient's pulse oximetry is normal here.  Discussed options including Paxlovid.  Pharmacy has reviewed her medication list and will have to stop her cholesterol medication and we will have to seriously monitor her blood pressure medication for interactions.  Patient has decided not to take Paxlovid.  She will be discharged home.  Instructed to follow-up with primary provider and return here for any concerns. [SL]      ED Course User Index  [SL] Raffi Fernandez MD                 DIAGNOSIS  Final diagnoses:   Generalized weakness   COVID-19         DISPOSITION  DISCHARGE    Patient discharged in stable condition.    Reviewed implications of results, diagnosis, meds, responsibility to follow up, warning  signs and symptoms of possible worsening, potential complications and reasons to return to ER, including worsening symptoms    Patient/Family voiced understanding of above instructions.    Discussed plan for discharge, as there is no emergent indication for admission. Patient referred to primary care provider for BP management due to today's BP. Pt/family is agreeable and understands need for follow up and repeat testing.  Pt is aware that discharge does not mean that nothing is wrong but it indicates no emergency is present that requires admission and they must continue care with follow-up as given below or physician of their choice.     FOLLOW-UP  Leon Layton MD  [address] [id number] [phone]    Schedule an appointment as soon as possible for a visit            Medication List      No changes were made to your prescriptions during this visit.                  Latest Documented Vital Signs:  As of 11:57 EDT  BP- 140/72 HR- 89 Temp- 100 °F (37.8 °C) O2 sat- 94%              --    Please note that portions of this were completed with a voice recognition program.       Note Disclaimer: At [facility], we believe that sharing information builds trust and better relationships. You are receiving this note because you are receiving care at [facility] or recently visited. It is possible you will see health information before a provider has talked with you about it. This kind of information can be easy to misunderstand. To help you fully understand what it means for your health, we urge you to discuss this note with your provider.            Raffi Fernandez MD  07/26/24 1200

## 2024-07-31 ENCOUNTER — TELEPHONE (OUTPATIENT)
Dept: ONCOLOGY | Facility: CLINIC | Age: 74
End: 2024-07-31

## 2024-07-31 NOTE — TELEPHONE ENCOUNTER
Caller: Sravanthi Burns    Relationship: Self    Best call back number: [phone]    What form or medical record are you requesting: MEDICAL RECORDS     Who is requesting this form or medical record from you: [facility] - APPT. IS ON 8/18/24    How would you like to receive the form or medical records (pick-up, mail, fax):  AS SHE NEEDS TO TAKE PAPERWORK TO HER [facility] APPT.     Timeframe paperwork needed: ASAP

## 2024-08-05 ENCOUNTER — LAB (OUTPATIENT)
Dept: ENDOCRINOLOGY | Age: 74
End: 2024-08-05
Payer: MEDICARE

## 2024-08-05 DIAGNOSIS — E03.9 HYPOTHYROIDISM, UNSPECIFIED TYPE: ICD-10-CM

## 2024-08-06 LAB
T4 FREE SERPL-MCNC: 1.49 NG/DL (ref 0.92–1.68)
TSH SERPL DL<=0.005 MIU/L-ACNC: 0.2 UIU/ML (ref 0.27–4.2)

## 2024-08-12 ENCOUNTER — TELEPHONE (OUTPATIENT)
Dept: ONCOLOGY | Facility: CLINIC | Age: 74
End: 2024-08-12
Payer: MEDICARE

## 2024-08-12 NOTE — TELEPHONE ENCOUNTER
Caller: Sravanthi Burns    Relationship: Self    Best call back number: [phone]    What is the best time to reach you: ANYTIME    Who are you requesting to speak with (clinical staff, provider,  specific staff member): CLINICAL    What was the call regarding: PT IS FOLLOWING UP TO HER 7/31 REQUEST OF MEDICAL RECORDS/SCANS BEING READY FOR HER TO  TO TAKE TO HER [facility] APPT.    PLEASE CALL PT TO ADVISE IF DOCUMENTS ARE READY FOR PICKUP

## 2024-08-12 NOTE — TELEPHONE ENCOUNTER
Returned call to pt advised her we do not provide hard copies of her medical records through the office.  Explained she will need to contact medical records at the hospital. Also explained she should go to the radiology department to get a copy of the images.  She voiced understanding.

## 2024-08-25 DIAGNOSIS — E11.22 TYPE 2 DIABETES MELLITUS WITH STAGE 4 CHRONIC KIDNEY DISEASE, WITHOUT LONG-TERM CURRENT USE OF INSULIN: Chronic | ICD-10-CM

## 2024-08-25 DIAGNOSIS — N18.4 TYPE 2 DIABETES MELLITUS WITH STAGE 4 CHRONIC KIDNEY DISEASE, WITHOUT LONG-TERM CURRENT USE OF INSULIN: Chronic | ICD-10-CM

## 2024-08-26 RX ORDER — SEMAGLUTIDE 2.68 MG/ML
INJECTION, SOLUTION SUBCUTANEOUS
Qty: 3 ML | Refills: 5 | Status: SHIPPED | OUTPATIENT
Start: 2024-08-26

## 2024-08-26 NOTE — TELEPHONE ENCOUNTER
Rx Refill Note  Requested Prescriptions     Pending Prescriptions Disp Refills    Ozempic, 2 MG/DOSE, 8 MG/3ML solution pen-injector [Pharmacy Med Name: OZEMPIC 2 MG/DOSE (8 MG/3 ML)] 3 mL 5     Sig: DIAL AND INJECT UNDER THE SKIN 2 MG WEEKLY      Last office visit with prescribing clinician: 3/11/2024   Last telemedicine visit with prescribing clinician: Visit date not found   Next office visit with prescribing clinician: 9/11/2024                         Would you like a call back once the refill request has been completed: [] Yes [] No    If the office needs to give you a call back, can they leave a voicemail: [] Yes [] No    Luda Cevallos MA  08/26/24, 07:36 EDT

## 2024-08-28 ENCOUNTER — TELEPHONE (OUTPATIENT)
Dept: SURGERY | Facility: CLINIC | Age: 74
End: 2024-08-28
Payer: MEDICARE

## 2024-08-28 NOTE — TELEPHONE ENCOUNTER
Pt notified her MRI biopsies are scheduled on 9/12/2024 arrive at 8:45 at [facility]. Pt verbalized understanding of appt time, date and location.     CMA

## 2024-08-29 ENCOUNTER — TELEPHONE (OUTPATIENT)
Dept: INTERNAL MEDICINE | Facility: CLINIC | Age: 74
End: 2024-08-29

## 2024-08-29 LAB
DX PRELIMINARY: NORMAL
LAB AP CASE REPORT: NORMAL
LAB AP CLINICAL INFORMATION: NORMAL
LAB AP DIAGNOSIS COMMENT: NORMAL
LAB AP SPECIAL STAINS: NORMAL
LAB AP SYNOPTIC CHECKLIST: NORMAL
Lab: NORMAL
PATH REPORT.ADDENDUM SPEC: NORMAL
PATH REPORT.FINAL DX SPEC: NORMAL
PATH REPORT.GROSS SPEC: NORMAL

## 2024-08-29 RX ORDER — BENZONATATE 200 MG/1
200 CAPSULE ORAL 3 TIMES DAILY PRN
Qty: 30 CAPSULE | Refills: 0 | Status: SHIPPED | OUTPATIENT
Start: 2024-08-29

## 2024-09-03 RX ORDER — ATORVASTATIN CALCIUM 20 MG/1
20 TABLET, FILM COATED ORAL NIGHTLY
Qty: 90 TABLET | Refills: 2 | Status: SHIPPED | OUTPATIENT
Start: 2024-09-03

## 2024-09-11 ENCOUNTER — OFFICE VISIT (OUTPATIENT)
Dept: ENDOCRINOLOGY | Age: 74
End: 2024-09-11
Payer: MEDICARE

## 2024-09-11 VITALS
OXYGEN SATURATION: 97 % | SYSTOLIC BLOOD PRESSURE: 124 MMHG | DIASTOLIC BLOOD PRESSURE: 78 MMHG | HEIGHT: 58 IN | BODY MASS INDEX: 31.23 KG/M2 | HEART RATE: 90 BPM | WEIGHT: 148.8 LBS

## 2024-09-11 DIAGNOSIS — E11.22 TYPE 2 DIABETES MELLITUS WITH STAGE 4 CHRONIC KIDNEY DISEASE, WITHOUT LONG-TERM CURRENT USE OF INSULIN: Primary | ICD-10-CM

## 2024-09-11 DIAGNOSIS — E66.9 CLASS 1 OBESITY WITH SERIOUS COMORBIDITY AND BODY MASS INDEX (BMI) OF 33.0 TO 33.9 IN ADULT, UNSPECIFIED OBESITY TYPE: ICD-10-CM

## 2024-09-11 DIAGNOSIS — N18.4 TYPE 2 DIABETES MELLITUS WITH STAGE 4 CHRONIC KIDNEY DISEASE, WITHOUT LONG-TERM CURRENT USE OF INSULIN: Primary | ICD-10-CM

## 2024-09-11 DIAGNOSIS — E03.9 HYPOTHYROIDISM, UNSPECIFIED TYPE: ICD-10-CM

## 2024-09-11 PROCEDURE — G2211 COMPLEX E/M VISIT ADD ON: HCPCS | Performed by: INTERNAL MEDICINE

## 2024-09-11 PROCEDURE — 3051F HG A1C>EQUAL 7.0%<8.0%: CPT | Performed by: INTERNAL MEDICINE

## 2024-09-11 PROCEDURE — 1160F RVW MEDS BY RX/DR IN RCRD: CPT | Performed by: INTERNAL MEDICINE

## 2024-09-11 PROCEDURE — 99214 OFFICE O/P EST MOD 30 MIN: CPT | Performed by: INTERNAL MEDICINE

## 2024-09-11 PROCEDURE — 3078F DIAST BP <80 MM HG: CPT | Performed by: INTERNAL MEDICINE

## 2024-09-11 PROCEDURE — 3074F SYST BP LT 130 MM HG: CPT | Performed by: INTERNAL MEDICINE

## 2024-09-11 PROCEDURE — 1159F MED LIST DOCD IN RCRD: CPT | Performed by: INTERNAL MEDICINE

## 2024-09-11 NOTE — PROGRESS NOTES
Chief complaint/Reason for consult: T2DM    HPI:   - 73 year old female here for management of diabetes mellitus type 2  - Last seen in 3/2024  - Was diagnosed with early stage breast cancer since last visit  - Has had diabetes since 2021  - No known complications to date (she does have CKD but it is unclear if this is from her diabetes or not)  - Is currently taking Ozempic 1 mg weekly and Jardiance 10 mg daily  - Denies hypoglycemia  - Glucometer/Insulin pump review shows  - Is on levothyroxine 137 mcg daily  - She also has CRYSTAL and sees an ophthalmologist  - She does states she has had difficulty losing weight for many years    The following portions of the patient's history were reviewed and updated as appropriate: allergies, current medications, past family history, past medical history, past social history, past surgical history, and problem list.      Objective     Vitals:    09/11/24 1001   BP: 124/78   Pulse: 90   SpO2: 97%        Physical Exam  Vitals reviewed.   Constitutional:       Appearance: Normal appearance.   HENT:      Head: Normocephalic and atraumatic.   Eyes:      General: No scleral icterus.  Pulmonary:      Effort: Pulmonary effort is normal. No respiratory distress.   Neurological:      Mental Status: She is alert.      Gait: Gait normal.   Psychiatric:         Mood and Affect: Mood normal.         Behavior: Behavior normal.         Thought Content: Thought content normal.         Judgment: Judgment normal.         Assessment & Plan   T2DM, controlled  2.   Obesity (BMI of 33)  - Cont. Ozempic 2 mg weekly and Jardiance 10 mg daily     3. Hypothyroidism  - Cont. Levothyroxine 100 mcg daily, recent TSH was normal     - Return to clinic in 6 months

## 2024-09-12 ENCOUNTER — HOSPITAL ENCOUNTER (OUTPATIENT)
Dept: MRI IMAGING | Facility: HOSPITAL | Age: 74
Discharge: HOME | End: 2024-09-12
Payer: MEDICARE

## 2024-09-12 ENCOUNTER — HOSPITAL ENCOUNTER (OUTPATIENT)
Dept: MAMMOGRAPHY | Facility: HOSPITAL | Age: 74
Discharge: HOME | End: 2024-09-12
Payer: MEDICARE

## 2024-09-12 VITALS
SYSTOLIC BLOOD PRESSURE: 140 MMHG | DIASTOLIC BLOOD PRESSURE: 77 MMHG | OXYGEN SATURATION: 99 % | RESPIRATION RATE: 16 BRPM | HEART RATE: 83 BPM

## 2024-09-12 DIAGNOSIS — Z98.890 STATUS POST BIOPSY: ICD-10-CM

## 2024-09-12 DIAGNOSIS — R92.8 ABNORMAL MRI, BREAST: ICD-10-CM

## 2024-09-12 LAB — CREAT BLDA-MCNC: 1.8 MG/DL (ref 0.6–1.3)

## 2024-09-12 PROCEDURE — C1894 INTRO/SHEATH, NON-LASER: HCPCS

## 2024-09-12 PROCEDURE — 25010000002 LIDOCAINE 1 % SOLUTION: Performed by: SURGERY

## 2024-09-12 PROCEDURE — 0 GADOBUTROL 1 MMOL/ML SOLUTION: Performed by: SURGERY

## 2024-09-12 PROCEDURE — 82565 ASSAY OF CREATININE: CPT

## 2024-09-12 PROCEDURE — 77066 DX MAMMO INCL CAD BI: CPT

## 2024-09-12 PROCEDURE — A4648 IMPLANTABLE TISSUE MARKER: HCPCS

## 2024-09-12 PROCEDURE — A9585 GADOBUTROL INJECTION: HCPCS | Performed by: SURGERY

## 2024-09-12 RX ORDER — LIDOCAINE HYDROCHLORIDE 10 MG/ML
1 INJECTION, SOLUTION INFILTRATION; PERINEURAL ONCE
Status: COMPLETED | OUTPATIENT
Start: 2024-09-12 | End: 2024-09-12

## 2024-09-12 RX ORDER — GADOBUTROL 604.72 MG/ML
6 INJECTION INTRAVENOUS
Status: COMPLETED | OUTPATIENT
Start: 2024-09-12 | End: 2024-09-12

## 2024-09-12 RX ORDER — DIAZEPAM 5 MG
5 TABLET ORAL ONCE AS NEEDED
Status: COMPLETED | OUTPATIENT
Start: 2024-09-12 | End: 2024-09-12

## 2024-09-12 RX ADMIN — LIDOCAINE HYDROCHLORIDE 1 ML: 10 INJECTION, SOLUTION INFILTRATION; PERINEURAL at 11:08

## 2024-09-12 RX ADMIN — DIAZEPAM 5 MG: 5 TABLET ORAL at 10:11

## 2024-09-12 RX ADMIN — LIDOCAINE HYDROCHLORIDE 15 ML: 10; .005 INJECTION, SOLUTION EPIDURAL; INFILTRATION; INTRACAUDAL; PERINEURAL at 11:12

## 2024-09-12 RX ADMIN — GADOBUTROL 6 ML: 604.72 INJECTION INTRAVENOUS at 10:54

## 2024-09-12 RX ADMIN — LIDOCAINE HYDROCHLORIDE 1 ML: 10 INJECTION, SOLUTION INFILTRATION; PERINEURAL at 11:11

## 2024-09-12 RX ADMIN — LIDOCAINE HYDROCHLORIDE 15 ML: 10; .005 INJECTION, SOLUTION EPIDURAL; INFILTRATION; INTRACAUDAL; PERINEURAL at 12:18

## 2024-09-12 NOTE — NURSING NOTE
Biopsy sites to both breasts clear with Exofin dry and intact. No firmness or swelling noted at or around biopsy site. Denies pain. Ice pack with protective covering applied to biopsy site. Discharge instructions discussed with understanding voiced by patient. Copies provided to patient. No distress noted. To home via personal vehicle.

## 2024-09-16 LAB
CYTO UR: NORMAL
LAB AP CASE REPORT: NORMAL
LAB AP DIAGNOSIS COMMENT: NORMAL
LAB AP SPECIAL STAINS: NORMAL
PATH REPORT.ADDENDUM SPEC: NORMAL
PATH REPORT.FINAL DX SPEC: NORMAL
PATH REPORT.GROSS SPEC: NORMAL

## 2024-09-25 ENCOUNTER — TELEPHONE (OUTPATIENT)
Dept: ONCOLOGY | Facility: CLINIC | Age: 74
End: 2024-09-25
Payer: MEDICARE

## 2024-10-09 LAB
DX PRELIMINARY: NORMAL
LAB AP CASE REPORT: NORMAL
LAB AP CLINICAL INFORMATION: NORMAL
LAB AP DIAGNOSIS COMMENT: NORMAL
LAB AP SPECIAL STAINS: NORMAL
LAB AP SYNOPTIC CHECKLIST: NORMAL
Lab: NORMAL
Lab: NORMAL
PATH REPORT.ADDENDUM SPEC: NORMAL
PATH REPORT.FINAL DX SPEC: NORMAL
PATH REPORT.GROSS SPEC: NORMAL

## 2024-10-18 LAB
CYTO UR: NORMAL
LAB AP CASE REPORT: NORMAL
LAB AP DIAGNOSIS COMMENT: NORMAL
LAB AP SPECIAL STAINS: NORMAL
Lab: NORMAL
PATH REPORT.ADDENDUM SPEC: NORMAL
PATH REPORT.FINAL DX SPEC: NORMAL
PATH REPORT.GROSS SPEC: NORMAL

## 2024-10-18 NOTE — H&P
Date: 3/2/2023   Time:    Procedure: Peripherally Inserted Central Catheter (PICC Line) Insertion    Indication: Vasopressors.    Patient consent: Signed consent in chart. Reviewed procedure, indication, and risks with patient. All patient questions were answered.    Pertinent Lab Values: INR, Platelets, and Creatinine were within normal ranges.    Description of procedure:   The patient’s right upper arm was prepped with a 2% Chlorhexidine & 70% alcohol skin antiseptic and draped in a sterile fashion and maximal barrier precautions were used.  1% Lidocaine was used to anesthetize the insertion site.  The cephalic vein was accessed using ultrasound guidance and a triple lumen 5 Hungarian catheter was introduced using the modified Seldinger technique. Each lumen of the PICC line gave brisk blood return and was easily flushed with 10 mls of sterile normal saline. The catheter was secured in place using a Statlock securement device and a Tegaderm CHG securement dressing was applied. There were no signs or symptoms of any PICC-related insertion complications and the patient tolerated the procedure well. Tip placement confirmation was obtained via [employer] technology. 3CG confirmation placed in chart.    Total PICC length:40     Upper arm circumference: 24    Lower arm circumference: 22    External catheter length: 0   Sravanthi Burns is a 69 y.o. female  who is referred by Lucy Dela Cruz MD for a colonoscopy. She is an  has a history of previous adenomatous polyp.     She denies any change in bowel function, melena, or hematochezia.    Past Medical History:   Diagnosis Date   • Anxiety    • Cardiomegaly     MILD   • Cataract    • Chronic kidney disease    • Colon polyps     FOLLOWED BY DR. LUCY DELA CRUZ   • DDD (degenerative disc disease), lumbar    • Disease of thyroid gland     HYPOTHYROIDISM   • Elevated cholesterol    • Excessive sleepiness    • GERD (gastroesophageal reflux disease)    • Gout    • Hard to intubate    • Hot flashes    • HPV in female    • Hyperlipidemia    • Hypertension    • Insomnia    • Low back pain    • Lumbar radiculopathy    • Lung disease    • DELIA (obstructive sleep apnea)    • Panic disorder    • Postmenopausal HRT (hormone replacement therapy)     PROGESTERONE AND ESTRADIAL   • Pulmonary nodules     RIGHT MIDDLE LOBE   • Rosacea    • Tachycardia        Past Surgical History:   Procedure Laterality Date   • CHOLECYSTECTOMY N/A 03/12/2002    WITH CHOLANGIOGRAM, DR. LYNNETTE CHAVEZ AT [facility]   • COLONOSCOPY N/A 10/4/2016    4 SESSILE TUBULAR ADENOMA POLYPS IN HEPATIC FLEXURE, 1 SESSILE TUBULAR ADENOMA AND 1 HYPERPLASTIC POLYP IN RECTUM, 6 MM SESSILE HYPERPLASTIC POLYP IN SIGMOID, 2 SESSILE HYPERPLASTI POLYPS IN RECTUM, MULTIPLE SMALL AND LARGE DIVERTICULA, RESCOPE IN 3 YRS, DR. LUCY DELA CRUZ AT [facility]   • COLONOSCOPY N/A 08/19/2003    ENTIRE COLON WNL, DR. ARIAS THOMASON AT [facility]   • COLONOSCOPY N/A 10/07/2008    MULTIPLE LARGE MOUTHED DIVERTICULA IN SIGMOID, RESCOPE IN 5 YRS, DR. ARIAS THOMASON AT [facility]   • COLONOSCOPY N/A 06/21/2011    DIVERTICULOSIS, MILD COLONIC SPASM, RESCOPE IN 5 YRS, DR. ARIAS THOMASON AT [facility]   • COSMETIC SURGERY N/A 1995    ABDOMENOPLASTY AND LIPOSUCTION, DR. MAUREEN WATERHOUSE   • COSMETIC SURGERY Bilateral 1997    ARM REDUCTION, BRACHIOPLASTY, DR. MAUREEN WATERHOUSE   • COSMETIC SURGERY  Bilateral     UPPER EYELID BLEPHAROPLASTY   • COSMETIC SURGERY N/A 10/13/2000    LIPECTOMY OF ABDOMINAL WALL, DR. MAUREEN WATERHOUSE   • CYSTOSCOPY N/A 03/19/2014    DR. JERMAINE MURRIETA AT [facility]   • DIAGNOSTIC LAPAROSCOPY N/A 06/04/2010    DEBRIDEMENT OF ABDOMINAL WALL, DR. SILVERIO AT [facility]   • DILATATION AND CURETTAGE N/A 02/2003   • ENDOSCOPY N/A 01/13/2006    REFLUX ESOPHAGITIS, OTHERWISE WNL, DR. JOSE J WELLS AT [facility]   • GASTRIC BANDING REMOVAL N/A 07/2011   • GASTRIC PORT CHANGE N/A     DR. SILVERIO AT [facility]   • HERNIA REPAIR N/A 03/12/2002    VENTRAL HERNIA REPAIR, DR. KAITLYNN CHAVEZ AT [facility]   • LAPAROSCOPIC GASTRIC BANDING N/A 02/21/2017    DR. SILVERIO AT [facility]   • LAPAROSCOPIC LYSIS OF ADHESIONS N/A 11/05/2018    DR. LORI HILL AT [facility]   • SHOULDER ARTHROSCOPY W/ LABRAL REPAIR Left     AND DEBRIDEMENT OF ROTATOR CUFF   • TENSION FREE VAGINAL TAPING WITH MINI ARC SLING N/A 03/19/2004    DR. JERMAINE MURRIETA AT [facility]   • TUBAL ABDOMINAL LIGATION Bilateral 1982       Medications Prior to Admission   Medication Sig Dispense Refill Last Dose   • allopurinol (ZYLOPRIM) 300 MG tablet Daily.   10/9/2019 at Unknown time   • atenolol (TENORMIN) 50 MG tablet TAKE ONE TABLET BY MOUTH DAILY 30 tablet 0 10/9/2019 at Unknown time   • Calcium-Vitamin D-Vitamin K 500-500-40 MG-UNT-MCG chewable tablet Chew.   Past Month at Unknown time   • Cholecalciferol (VITAMIN D3) 5000 units chewable tablet Chew Daily.   Past Week at Unknown time   • estradiol (ESTRACE) 0.1 MG/GM vaginal cream Insert 2 g into the vagina Daily. Estradiol-testosterone cream compounded, use daily   10/9/2019 at Unknown time   • imipramine (TOFRANIL) 50 MG tablet TAKE ONE TABLET BY MOUTH AT BEDTIME 30 tablet 0 10/9/2019 at Unknown time   • levothyroxine (SYNTHROID, LEVOTHROID) 50 MCG tablet Take 50 mcg by mouth Daily.   10/9/2019 at Unknown time   • valsartan (DIOVAN) 160 MG tablet Take 320 mg by mouth Daily.    "10/9/2019 at Unknown time   • esomeprazole (NEXIUM) 20 MG packet Take 20 mg by mouth Daily.   Not Taking   • Estradiol 18 MG pellet Apply 2 Units topically to the appropriate area as directed 2 (Two) Times a Day.   Taking   • FIBER ADULT GUMMIES PO Take  by mouth Daily.   Not Taking   • furosemide (LASIX) 40 MG tablet    10/8/2019   • progesterone (PROMETRIUM) 100 MG capsule 200 mg.   10/8/2019       Allergies   Allergen Reactions   • Other Dermatitis, Itching and Swelling     \"surgical glue\"   • Duloxetine Other (See Comments)     Other reaction(s): Decreased libido  DECREASED LIBIDO   • Erythromycin Nausea Only   • Formaldehyde Itching   • Minocycline Swelling     GENERALIZED   • Nsaids Other (See Comments)     Avoids due to CKD    • Tea Tree Oil Other (See Comments)     Found during allergy test   • Venlafaxine Other (See Comments)     Other reaction(s): Decreased libido  DECREASED LIBIDO.       Family History   Problem Relation Age of Onset   • Heart murmur Mother    • Hyperlipidemia Mother    • Heart disease Mother    • Hypertension Mother    • Alcohol abuse Father    • Cancer Father    • Hypertension Sister    • Heart disease Brother    • Basal cell carcinoma Brother    • Hypertension Brother    • Cancer Brother    • No Known Problems Daughter    • No Known Problems Son    • Cancer Maternal Grandmother    • Suicidality Paternal Grandfather        Social History     Socioeconomic History   • Marital status:      Spouse name: Not on file   • Number of children: Not on file   • Years of education: Not on file   • Highest education level: Not on file   Tobacco Use   • Smoking status: Former Smoker     Packs/day: 0.50     Years: 20.00     Pack years: 10.00     Types: Cigarettes     Last attempt to quit:      Years since quittin.7   • Smokeless tobacco: Never Used   Substance and Sexual Activity   • Alcohol use: Yes     Comment: RARELY   • Drug use: No   • Sexual activity: Defer       Review of " Systems   Gastrointestinal: Negative for abdominal pain, nausea and vomiting.   All other systems reviewed and are negative.      Vitals:    10/10/19 1653   BP: 134/68   Pulse: 95   Resp:    Temp:    SpO2: 94%         Physical Exam   Constitutional: She is oriented to person, place, and time. She appears well-developed and well-nourished.   HENT:   Head: Normocephalic and atraumatic.   Eyes: EOM are normal. Pupils are equal, round, and reactive to light.   Cardiovascular: Regular rhythm.   Pulmonary/Chest: Effort normal.   Abdominal: Soft. She exhibits no distension.   Musculoskeletal: Normal range of motion.   Neurological: She is alert and oriented to person, place, and time.   Skin: Skin is warm and dry.   Psychiatric: Judgment and thought content normal.         Assessment/Plan      previous adenomatous polyp.         I recommend colonoscopy.  I described risks, benefits of the procedure with the patient including but not limited to bleeding, infection, possibility of perforation and possible polypectomy. All of the patient's questions were answered and they would like to proceed with the above recommendations.         English

## 2024-10-29 ENCOUNTER — LAB (OUTPATIENT)
Dept: LAB | Facility: HOSPITAL | Age: 74
End: 2024-10-29
Payer: MEDICARE

## 2024-10-29 ENCOUNTER — OFFICE VISIT (OUTPATIENT)
Dept: ONCOLOGY | Facility: CLINIC | Age: 74
End: 2024-10-29
Payer: MEDICARE

## 2024-10-29 VITALS
HEIGHT: 58 IN | WEIGHT: 148.3 LBS | SYSTOLIC BLOOD PRESSURE: 125 MMHG | BODY MASS INDEX: 31.13 KG/M2 | HEART RATE: 83 BPM | OXYGEN SATURATION: 96 % | DIASTOLIC BLOOD PRESSURE: 75 MMHG | TEMPERATURE: 98 F

## 2024-10-29 DIAGNOSIS — Z17.0 MALIGNANT NEOPLASM OF OVERLAPPING SITES OF RIGHT BREAST IN FEMALE, ESTROGEN RECEPTOR POSITIVE: ICD-10-CM

## 2024-10-29 DIAGNOSIS — D72.825 BANDEMIA: ICD-10-CM

## 2024-10-29 DIAGNOSIS — D72.823 LEUKEMOID REACTION: ICD-10-CM

## 2024-10-29 DIAGNOSIS — C50.811 MALIGNANT NEOPLASM OF OVERLAPPING SITES OF RIGHT BREAST IN FEMALE, ESTROGEN RECEPTOR POSITIVE: ICD-10-CM

## 2024-10-29 DIAGNOSIS — D47.1 CHRONIC MYELOPROLIFERATIVE DISEASE: ICD-10-CM

## 2024-10-29 DIAGNOSIS — R94.6 ABNORMAL RESULTS OF THYROID FUNCTION STUDIES: Primary | ICD-10-CM

## 2024-10-29 DIAGNOSIS — R94.6 ABNORMAL RESULTS OF THYROID FUNCTION STUDIES: ICD-10-CM

## 2024-10-29 LAB
ALBUMIN SERPL-MCNC: 3.8 G/DL (ref 3.5–5.2)
ALBUMIN/GLOB SERPL: 1.4 G/DL
ALP SERPL-CCNC: 174 U/L (ref 39–117)
ALT SERPL W P-5'-P-CCNC: 13 U/L (ref 1–33)
ANION GAP SERPL CALCULATED.3IONS-SCNC: 11 MMOL/L (ref 5–15)
AST SERPL-CCNC: 22 U/L (ref 1–32)
BASOPHILS # BLD AUTO: 0.1 10*3/MM3 (ref 0–0.2)
BASOPHILS NFR BLD AUTO: 1 % (ref 0–1.5)
BILIRUB SERPL-MCNC: 0.6 MG/DL (ref 0–1.2)
BUN SERPL-MCNC: 21 MG/DL (ref 8–23)
BUN/CREAT SERPL: 13.8 (ref 7–25)
CALCIUM SPEC-SCNC: 9.5 MG/DL (ref 8.6–10.5)
CHLORIDE SERPL-SCNC: 99 MMOL/L (ref 98–107)
CO2 SERPL-SCNC: 29 MMOL/L (ref 22–29)
CREAT SERPL-MCNC: 1.52 MG/DL (ref 0.57–1)
DEPRECATED RDW RBC AUTO: 53.5 FL (ref 37–54)
EGFRCR SERPLBLD CKD-EPI 2021: 36.3 ML/MIN/1.73
EOSINOPHIL # BLD AUTO: 0.42 10*3/MM3 (ref 0–0.4)
EOSINOPHIL NFR BLD AUTO: 4.1 % (ref 0.3–6.2)
ERYTHROCYTE [DISTWIDTH] IN BLOOD BY AUTOMATED COUNT: 15.5 % (ref 12.3–15.4)
GLOBULIN UR ELPH-MCNC: 2.7 GM/DL
GLUCOSE SERPL-MCNC: 111 MG/DL (ref 65–99)
HCT VFR BLD AUTO: 42.4 % (ref 34–46.6)
HGB BLD-MCNC: 14.3 G/DL (ref 12–15.9)
IMM GRANULOCYTES # BLD AUTO: 0.16 10*3/MM3 (ref 0–0.05)
IMM GRANULOCYTES NFR BLD AUTO: 1.6 % (ref 0–0.5)
LYMPHOCYTES # BLD AUTO: 3.17 10*3/MM3 (ref 0.7–3.1)
LYMPHOCYTES NFR BLD AUTO: 31 % (ref 19.6–45.3)
MCH RBC QN AUTO: 31.6 PG (ref 26.6–33)
MCHC RBC AUTO-ENTMCNC: 33.7 G/DL (ref 31.5–35.7)
MCV RBC AUTO: 93.6 FL (ref 79–97)
MONOCYTES # BLD AUTO: 0.77 10*3/MM3 (ref 0.1–0.9)
MONOCYTES NFR BLD AUTO: 7.5 % (ref 5–12)
NEUTROPHILS NFR BLD AUTO: 5.6 10*3/MM3 (ref 1.7–7)
NEUTROPHILS NFR BLD AUTO: 54.8 % (ref 42.7–76)
NRBC BLD AUTO-RTO: 0 /100 WBC (ref 0–0.2)
PLATELET # BLD AUTO: 407 10*3/MM3 (ref 140–450)
PMV BLD AUTO: 8.4 FL (ref 6–12)
POTASSIUM SERPL-SCNC: 3.9 MMOL/L (ref 3.5–5.2)
PROT SERPL-MCNC: 6.5 G/DL (ref 6–8.5)
RBC # BLD AUTO: 4.53 10*6/MM3 (ref 3.77–5.28)
SODIUM SERPL-SCNC: 139 MMOL/L (ref 136–145)
WBC NRBC COR # BLD AUTO: 10.22 10*3/MM3 (ref 3.4–10.8)

## 2024-10-29 PROCEDURE — 80053 COMPREHEN METABOLIC PANEL: CPT

## 2024-10-29 PROCEDURE — 85025 COMPLETE CBC W/AUTO DIFF WBC: CPT

## 2024-10-29 PROCEDURE — 36415 COLL VENOUS BLD VENIPUNCTURE: CPT

## 2024-10-29 NOTE — PROGRESS NOTES
REASONS FOR FOLLOWUP:    Followed historically by Dr. Haley for mild leukocytosis and thrombocytosis.  Full workup revealing no evidence of myeloproliferative disorder  New diagnosis of breast cancer in June 2024, treated through [facility].    HISTORY OF PRESENT ILLNESS:  The patient is a 74 y.o. year old female who is seen today in follow-up, formally followed by Dr. Haley in this practice, new to me today.  Historically she has been followed for mild leukocytosis and thrombocytosis with no findings ever of any type of myeloproliferative disorder. From this standpoint, today, she and platelet count are high normal, but normal nevertheless.    In the summer 2024 she was diagnosed with breast cancer and self-referred to [facility] where she underwent all of her treatment.  We are just learning about this today.  Multiple records are reviewed for clarity of exactly what has transpired.     She underwent ultrasound-guided biopsy of the right breast 6/21/2024 showing invasive ductal carcinoma intermediate nuclear grade, [place] histologic grade 2.  ER positive greater than 95%, IA +80%, HER2 IHC equivocal (2+) HER2 FISH negative, Ki-67 approximately 20%.     She then underwent right lumpectomy 10/4/2024 with Dr. Beverly, breast surgery at [facility], with evidence of invasive ductal carcinoma [place] histologic grade 2 (moderately differentiated) see DCIS, low and intermediate nuclear grade, invasive carcinoma measuring 15 x 12 x 11.5 mm, invasive carcinoma present 9 mm from closest anterior margin.  DCIS present 1.3 mm from closest posterior margin.  No lymphovascular invasion.  Microcalcifications present in association with benign breast tissue.  2 sentinel lymph nodes negative for metastatic disease.    She thereafter met with Dr. Griffiths, radiation oncology at [facility] with plans to return next week for 5 doses of radiation.    She additionally met with medical oncology, Dr. Boyle.      As she  is reviewed back today patient states she has no intention of doing any type of endocrine therapy.  She has researched the side effects of AI therapy and does not want to take the risk of hair loss (history of hair thinning), worsening bone density, or potential changes with hot flashes or mood swings.  She cannot take tamoxifen due to history of DVT.      Past Medical History:   Diagnosis Date    Anxiety     Arthritis     Cardiomegaly     MILD    Cataract     Chronic kidney disease     Colon polyps     FOLLOWED BY DR. LUCY HILL    DDD (degenerative disc disease), lumbar     Diabetes mellitus 2021    Disease of thyroid gland     HYPOTHYROIDISM    Diverticulitis of colon     Elevated cholesterol     Excessive sleepiness     Fatty liver     GERD (gastroesophageal reflux disease)     Gout     Hard to intubate     History of cardiomegaly     History of leukocytosis     History of vitamin D deficiency     Hot flashes     HPV in female     Hyperlipidemia     Hypertension     Hypothyroidism     Insomnia     Leiomyoma     Low back pain     Lumbar radiculopathy     Lung disease     Obesity     DELIA (obstructive sleep apnea)     Panic disorder     Plantar fasciitis     Postmenopausal HRT (hormone replacement therapy)     PROGESTERONE AND ESTRADIAL    Pulmonary nodules     RIGHT MIDDLE LOBE    Renal insufficiency     Rosacea     Steatosis of liver     Tachycardia     Type 2 diabetes mellitus 2020     Past Surgical History:   Procedure Laterality Date    BARIATRIC SURGERY  2006    Removed lapband 2010    CHOLECYSTECTOMY N/A 03/12/2002    WITH CHOLANGIOGRAM, DR. LYNNETTE CHAVEZ AT [facility]    COLONOSCOPY N/A 10/04/2016    4 SESSILE TUBULAR ADENOMA POLYPS IN HEPATIC FLEXURE, 1 SESSILE TUBULAR ADENOMA AND 1 HYPERPLASTIC POLYP IN RECTUM, 6 MM SESSILE HYPERPLASTIC POLYP IN SIGMOID, 2 SESSILE HYPERPLASTI POLYPS IN RECTUM, MULTIPLE SMALL AND LARGE DIVERTICULA, RESCOPE IN 3 YRS, DR. LUCY HILL AT [facility]    COLONOSCOPY N/A 08/19/2003     ENTIRE COLON WNL, DR. ARIAS THOMASON AT [facility]    COLONOSCOPY N/A 10/07/2008    MULTIPLE LARGE MOUTHED DIVERTICULA IN SIGMOID, RESCOPE IN 5 YRS, DR. ARIAS THOMASON AT [facility]    COLONOSCOPY N/A 06/21/2011    DIVERTICULOSIS, MILD COLONIC SPASM, RESCOPE IN 5 YRS, DR. ARIAS THOMASON AT [facility]    COLONOSCOPY N/A 10/10/2019    5 MM HYPERPLASTIC POLYP IN CECUM, 5 MM HYPERPLASTIC POLYP IN ASCENDING, 5 MM HYPERPLASTIC POLYP IN TRANSVERSE, 5 MM HYPERPLASTIC POLYP IN DESCENDING, RESCOPE IN 3 YRS, DR. LUCY HILL AT [facility]    COLONOSCOPY N/A 11/29/2022    Procedure: COLONOSCOPY to cecum and TI;  with biopsies, cold bx polyps,;  Surgeon: Kathy Araya MD;  Location:  [facility];  Service: Gastroenterology;  Laterality: N/A;  pre:  Diarrhea, left lower quadrant pain, abnormal CT scan of the sigmoid colon  post:  polyps, diverticulosis, abnormal colon mucosa, hemorrhoids    COSMETIC SURGERY N/A 1995    ABDOMINOPLASTY AND LIPOSUCTION, DR. MAUREEN [facility]HOU[facility]    COSMETIC SURGERY Bilateral 1997    ARM REDUCTION, BRACHIOPLASTY, DR. MAUREEN [facility]HOUSE    COSMETIC SURGERY Bilateral     UPPER EYELID BLEPHAROPLASTY    COSMETIC SURGERY N/A 10/13/2000    LIPECTOMY OF ABDOMINAL WALL, DR. MAUREEN [facility]    CYSTOSCOPY N/A 03/19/2014    DR. JERMAINE MURRIETA AT [facility]    DIAGNOSTIC LAPAROSCOPY N/A 06/04/2010    DEBRIDEMENT OF ABDOMINAL WALL, DR. SILVERIO AT [facility]    DILATATION AND CURETTAGE N/A 02/2003    ENDOSCOPY N/A 01/13/2006    REFLUX ESOPHAGITIS, OTHERWISE WNL, DR. JOSE J WELLS AT [facility]    ENDOSCOPY N/A 11/29/2022    Procedure: ESOPHAGOGASTRODUODENOSCOPY iwth biopsies;  Surgeon: Kathy Araya MD;  Location:  [facility];  Service: Gastroenterology;  Laterality: N/A;  Pre:  epigastric pain  post:  gastritis, esophagitis,     GASTRIC BANDING REMOVAL N/A 07/2011    GASTRIC PORT CHANGE N/A     DR. SILVERIO AT [facility]    HERNIA REPAIR N/A 03/12/2002    VENTRAL HERNIA REPAIR, DR. KAITLYNN CHAVEZ AT [facility]     LAPAROSCOPIC GASTRIC BANDING N/A 02/21/2017    DR. SILVERIO AT [facility]    LAPAROSCOPIC LYSIS OF ADHESIONS N/A 11/05/2018    DR. LORI HILL AT [place]    SHOULDER ARTHROSCOPY W/ LABRAL REPAIR Left     AND DEBRIDEMENT OF ROTATOR CUFF    TENSION FREE VAGINAL TAPING WITH MINI ARC SLING N/A 03/19/2004    DR. JERMAINE MURRIETA AT [facility]    TUBAL ABDOMINAL LIGATION Bilateral 1982    UPPER GASTROINTESTINAL ENDOSCOPY  2022       MEDICATIONS    Current Outpatient Medications:     Acetaminophen (TYLENOL 8 HOUR PO), Take  by mouth As Needed., Disp: , Rfl:     allopurinol (ZYLOPRIM) 300 MG tablet, Take 1 tablet by mouth Daily., Disp: , Rfl:     atenolol (TENORMIN) 50 MG tablet, Take 1 tablet by mouth Daily., Disp: 90 tablet, Rfl: 2    atorvastatin (LIPITOR) 20 MG tablet, TAKE ONE TABLET BY MOUTH ONCE NIGHTLY, Disp: 90 tablet, Rfl: 2    Cholecalciferol (VITAMIN D3) 5000 units chewable tablet, Chew 5,000 Units Daily., Disp: , Rfl:     DHEA 25 MG capsule, , Disp: , Rfl:     empagliflozin (Jardiance) 10 MG tablet tablet, Take 1 tablet by mouth Daily., Disp: , Rfl:     furosemide (LASIX) 40 MG tablet, Take 1 tablet by mouth Daily., Disp: 90 tablet, Rfl: 2    glucose blood test strip, Use to test glucose once daily. Dx DM 2 E11.9, Disp: 100 each, Rfl: 11    imipramine (TOFRANIL) 25 MG tablet, Take 3 tablets by mouth Every Night., Disp: 270 tablet, Rfl: 2    levothyroxine (SYNTHROID, LEVOTHROID) 100 MCG tablet, Take 1 tablet by mouth Daily., Disp: 90 tablet, Rfl: 3    multivitamin with minerals (ONE-A-DAY WOMENS PO), Take 1 tablet by mouth Daily., Disp: , Rfl:     Nutritional Supplements (Silica) 12.5 MG capsule, , Disp: , Rfl:     omeprazole (priLOSEC) 40 MG capsule, Take 1 capsule by mouth Daily., Disp: 90 capsule, Rfl: 3    Ozempic, 2 MG/DOSE, 8 MG/3ML solution pen-injector, DIAL AND INJECT UNDER THE SKIN 2 MG WEEKLY, Disp: 3 mL, Rfl: 5    Peppermint Oil (IBgard) 90 MG capsule controlled-release, , Disp: , Rfl:     Probiotic  "Product (PROBIOTIC PO), Take  by mouth., Disp: , Rfl:     Probiotic Product capsule, , Disp: , Rfl:     valsartan (DIOVAN) 160 MG tablet, TAKE 2 TABLETS BY MOUTH DAILY, Disp: 90 tablet, Rfl: 2    ALLERGIES:     Allergies   Allergen Reactions    Other Dermatitis, Itching and Swelling     \"surgical glue\"    Wound Dressing Adhesive Itching    Duloxetine Other (See Comments)     Other reaction(s): Decreased libido  DECREASED LIBIDO    Erythromycin Nausea Only    Formaldehyde Itching    Minocycline Swelling     GENERALIZED    Nsaids Other (See Comments)     Avoids due to CKD     Tea Tree Oil Other (See Comments)     Found during allergy test    Venlafaxine Other (See Comments)     Other reaction(s): Decreased libido  DECREASED LIBIDO.       SOCIAL HISTORY:       Social History     Socioeconomic History    Marital status:      Spouse name: Lincoln    Number of children: 2   Tobacco Use    Smoking status: Former     Current packs/day: 0.00     Average packs/day: 0.5 packs/day for 20.0 years (10.0 ttl pk-yrs)     Types: Cigarettes     Start date: 1972     Quit date: 1992     Years since quittin.8    Smokeless tobacco: Never   Vaping Use    Vaping status: Never Used   Substance and Sexual Activity    Alcohol use: Yes     Comment: Do not drink daily or weekly    Drug use: No    Sexual activity: Yes     Partners: Male     Birth control/protection: Post-menopausal, Tubal ligation         FAMILY HISTORY:  Family History   Problem Relation Age of Onset    Heart murmur Mother     Hyperlipidemia Mother     Heart disease Mother     Hypertension Mother     Alcohol abuse Father     Liver disease Father     Cirrhosis Father     COPD Father     Liver cancer Father     Hypertension Sister     Heart disease Sister     Skin cancer Sister     Heart disease Brother     Basal cell carcinoma Brother     Hypertension Brother     Diabetes Brother     Gout Brother     Prostate cancer Brother     Obesity Brother     Skin cancer " "Brother     Prostate cancer Brother     Cancer Maternal Grandmother     Suicidality Paternal Grandfather     Valvular heart disease Daughter     No Known Problems Son     Prostate cancer Maternal Uncle     Colon cancer Paternal Aunt        REVIEW OF SYSTEMS:  Review of Systems         Vitals:    10/29/24 1301   BP: 125/75   Pulse: 83   Temp: 98 °F (36.7 °C)   TempSrc: Oral   SpO2: 96%   Weight: 67.3 kg (148 lb 4.8 oz)   Height: 147.3 cm (58\")   PainSc: 0-No pain         10/29/2024     1:03 PM   Current Status   ECOG score 0       PHYSICAL EXAM:      CONSTITUTIONAL:  Vital signs reviewed.  No distress, looks comfortable.  EYES:  Conjunctiva and lids unremarkable.  PERRLA  EARS,NOSE,MOUTH,THROAT:  Ears and nose appear unremarkable.  Lips, teeth, gums appear unremarkable.  RESPIRATORY:  Normal respiratory effort.  Lungs clear to auscultation bilaterally.  BREASTS: s/p recent lumpectomy and bilateral reduction. Incisions healing well, no evidence of infection.  CARDIOVASCULAR:  Normal S1, S2.  No murmurs rubs or gallops.  No significant lower extremity edema.  GASTROINTESTINAL: Abdomen appears unremarkable.  Nontender.  No hepatomegaly.  No splenomegaly.  LYMPHATIC:  No cervical, supraclavicular, axillary lymphadenopathy.  MUSCULOSKELETAL:  Unremarkable gait and station.  Unremarkable digits/nails.  No cyanosis or clubbing.  SKIN:  Warm.  No rashes.  PSYCHIATRIC:  Normal judgment and insight.  Normal mood and affect.      RECENT LABS:  Results from last 7 days   Lab Units 10/29/24  1254   WBC 10*3/mm3 10.22   NEUTROS ABS 10*3/mm3 5.60   HEMOGLOBIN g/dL 14.3   HEMATOCRIT % 42.4   PLATELETS 10*3/mm3 407     Results from last 7 days   Lab Units 10/29/24  1254   SODIUM mmol/L 139   POTASSIUM mmol/L 3.9   CHLORIDE mmol/L 99   CO2 mmol/L 29.0   BUN mg/dL 21   CREATININE mg/dL 1.52*   CALCIUM mg/dL 9.5   ALBUMIN g/dL 3.8   BILIRUBIN mg/dL 0.6   ALK PHOS U/L 174*   ALT (SGPT) U/L 13   AST (SGOT) U/L 22   GLUCOSE mg/dL 111*      "        ASSESSMENT:  *History of mild leukocytosis and thrombocytosis.  Full workup revealing no evidence of myeloproliferative disorder  10/2023 platelets 472K  11/2023 WBC high of 16.7  BCR/ABL per PCR negative  ?  Infectious etiology  JAK2 mutation, MPL mutation negative  LDH normal.  Cortisol normal.  10/29/2024 per review today patient's WBC count is normal at 10.2, platelets 407,000.    *New diagnosis of breast cancer in June 2024, treated through [facility].  6/2024 abnormal mammogram leading to ultrasound-guided biopsy of the right breast 6/21/2024 showing invasive ductal carcinoma intermediate nuclear grade, [place] histologic grade 2.  ER positive greater than 95%, [place] +80%, HER2 IHC equivocal (2+) HER2 FISH negative, Ki-67 approximately 20%.   Seen initially by Dr. Velasco locally.  Did not have a good experience locally and decided to self-refer to [facility].  She was never seen here by medical oncology.  10/4/2024 status post lumpectomy with Dr. Beverly, breast surgery at [facility], with evidence of invasive ductal carcinoma [place] histologic grade 2 (moderately differentiated) see DCIS, low and intermediate nuclear grade, invasive carcinoma measuring 15 x 12 x 11.5 mm, invasive carcinoma present 9 mm from closest anterior margin.  DCIS present 1.3 mm from closest posterior margin.  No lymphovascular invasion.  Microcalcifications present in association with benign breast tissue.  2 sentinel lymph nodes negative for metastatic disease.  Seen thereafter by Dr. Griffiths, radiation oncology at [facility] with plans to return next week for 5 doses of radiation.  She reports today, 10/29/2024 that she will return to [facility] next week for these treatments.  She additionally met with medical oncology, Dr. Boyle.    10/29/2024 patient states today that she has done her research and has no plans to do endocrine therapy such as AI for hormone positive breast cancer.  She does not want the potential  side effects of hair loss, joint aches, loss of bone density.  She cannot take tamoxifen due to history of DVT reportedly.  She states she is at peace with her choice.      PLAN:  Patient has no evidence of recurrent leukocytosis or thrombocytosis.  Patient is status post very recent lumpectomy and bilateral mastopexy at [facility].  Plans reportedly to return next week for 5 days of radiation at [facility].  She otherwise does want to be followed locally by medical oncology and therefore is establishing now with us specifically for new breast cancer diagnosis.  She refuses any type of endocrine therapy, specifically states she will not take AI therapy and has a history of DVT reportedly therefore not a good candidate for tamoxifen.  She would be due for new screening mammogram June 2025.  We will have her return in 3 months to follow-up with Dr. Sheriff (to establish care; former Dr. Haley patient).    I spent 60 minutes caring for Sravanthi on this date of service. This time includes time spent by me in the following activities: preparing for the visit, reviewing tests, performing a medically appropriate examination and/or evaluation, counseling and educating the patient/family/caregiver, referring and communicating with other health care professionals, documenting information in the medical record, care coordination, ordering test(s), obtaining a separately obtained history, and reviewing a separately obtained history

## 2024-11-05 ENCOUNTER — TELEPHONE (OUTPATIENT)
Dept: ONCOLOGY | Facility: CLINIC | Age: 74
End: 2024-11-05
Payer: MEDICARE

## 2024-11-14 ENCOUNTER — TELEPHONE (OUTPATIENT)
Dept: INTERNAL MEDICINE | Facility: CLINIC | Age: 74
End: 2024-11-14

## 2024-11-14 NOTE — TELEPHONE ENCOUNTER
Caller: Sravanthi Burns    Relationship: Self    Best call back number:   Telephone Information:   Mobile [phone]         What medication are you requesting: COUGH MEDICATION    What are your current symptoms: UNCONTROLLABLE COUGH    How long have you been experiencing symptoms: STARTED THIS PAST WEEKEND    Have you had these symptoms before:    [x] Yes  [] No    Have you been treated for these symptoms before:   [x] Yes  [] No    If a prescription is needed, what is your preferred pharmacy and phone number: [facility] [id number] - [facility] [address] AT [facility] [phone] [facility] [phone]      Additional notes:  PATIENT STATES THE COUGH IS COMING MORE FROM HER CHEST THAN HER THROAT. PATIENT STATES SHE IS GOING OUT OF TOWN TO [place], [place] FOR HER CANCER TREATMENTS AND WOULD LIKE TO HAVE THE MEDICATION SENT BEFORE SHE LEAVES, BECAUSE SHE HAS TO HOLD COMPLETELY STILL FOR THE TREATMENT. IF APPOINTMENT IS NEEDED SHE IS ONLY AVAILABLE 11/14/2024 AND 11/15/2024.    PLEASE CALL PATIENT TO DISCUSS.

## 2024-11-15 ENCOUNTER — OFFICE VISIT (OUTPATIENT)
Dept: INTERNAL MEDICINE | Facility: CLINIC | Age: 74
End: 2024-11-15
Payer: MEDICARE

## 2024-11-15 VITALS
HEART RATE: 100 BPM | DIASTOLIC BLOOD PRESSURE: 62 MMHG | SYSTOLIC BLOOD PRESSURE: 118 MMHG | TEMPERATURE: 98.9 F | OXYGEN SATURATION: 95 % | HEIGHT: 58 IN | BODY MASS INDEX: 30.98 KG/M2 | WEIGHT: 147.6 LBS

## 2024-11-15 DIAGNOSIS — R05.1 ACUTE COUGH: Primary | ICD-10-CM

## 2024-11-15 PROCEDURE — 1126F AMNT PAIN NOTED NONE PRSNT: CPT | Performed by: STUDENT IN AN ORGANIZED HEALTH CARE EDUCATION/TRAINING PROGRAM

## 2024-11-15 PROCEDURE — 3078F DIAST BP <80 MM HG: CPT | Performed by: STUDENT IN AN ORGANIZED HEALTH CARE EDUCATION/TRAINING PROGRAM

## 2024-11-15 PROCEDURE — 3051F HG A1C>EQUAL 7.0%<8.0%: CPT | Performed by: STUDENT IN AN ORGANIZED HEALTH CARE EDUCATION/TRAINING PROGRAM

## 2024-11-15 PROCEDURE — 3074F SYST BP LT 130 MM HG: CPT | Performed by: STUDENT IN AN ORGANIZED HEALTH CARE EDUCATION/TRAINING PROGRAM

## 2024-11-15 PROCEDURE — 99213 OFFICE O/P EST LOW 20 MIN: CPT | Performed by: STUDENT IN AN ORGANIZED HEALTH CARE EDUCATION/TRAINING PROGRAM

## 2024-11-15 RX ORDER — CODEINE PHOSPHATE/GUAIFENESIN 10-100MG/5
5 LIQUID (ML) ORAL 3 TIMES DAILY PRN
Qty: 118 ML | Refills: 0 | Status: SHIPPED | OUTPATIENT
Start: 2024-11-15

## 2024-11-15 RX ORDER — BENZONATATE 200 MG/1
200 CAPSULE ORAL 3 TIMES DAILY PRN
Qty: 90 CAPSULE | Refills: 0 | Status: SHIPPED | OUTPATIENT
Start: 2024-11-15

## 2024-11-15 NOTE — PROGRESS NOTES
"  Leon Layton M.D.  Internal Medicine  [facility]  [address], [address]  [place] [address]  [phone]      Chief Complaint  Cough (Coughing x 1 week no other symptoms non productive cough /)    SUBJECTIVE  Sravanthi Burns is a 74 y.o. female with CKD, hypothyroidism, GERD, hyperlipidemia, hypertension, history of DVT who presents to the office today as an established patient that last saw me on 6/4/2024.     She is here for cough since the weekend.  She is requesting cough medication.  She feels this is more in her chest and her throat.  She is going out of town soon for cancer treatment to [facility].  She is concerned she is not going to be able to hold still for treatment because of cough. No fever, otherwise not feel bad. Cough worse at night. Worse talking. No shrtness of breath. No chest pain. Cough is non productive. Taking \"everything over the counter\". Sleeping with cough drop in her mouth. No nasal congestion drainage. No sore throat.     This is a recurrent problem. The current episode started in the past 7 days. The problem has been resolved. The problem occurs hourly. The cough is Rattling. Associated symptoms include myalgias and shortness of breath. Pertinent negatives include no chest pain, chills, ear congestion, ear pain, fever, headaches, heartburn, hemoptysis, nasal congestion, postnasal drip, rash, rhinorrhea, sore throat, sweats, weight loss or wheezing. The symptoms are aggravated by lying down. Risk factors for lung disease include animal exposure and travel.     Recently diagnosed with breast cancer.  She had surgery and radiation is planned in [place]. Does not what hormone therapy. Worried about weak bones and aging.     Review of Systems    Allergies   Allergen Reactions    Other Dermatitis, Itching and Swelling     \"surgical glue\"    Wound Dressing Adhesive Itching    Duloxetine Other (See Comments)     Other reaction(s): Decreased libido  DECREASED " LIBIDO    Erythromycin Nausea Only    Formaldehyde Itching    Minocycline Swelling     GENERALIZED    Nsaids Other (See Comments)     Avoids due to CKD     Tea Tree Oil Other (See Comments)     Found during allergy test    Venlafaxine Other (See Comments)     Other reaction(s): Decreased libido  DECREASED LIBIDO.        Outpatient Medications Marked as Taking for the 11/15/24 encounter (Office Visit) with Leon Layton MD   Medication Sig Dispense Refill    Acetaminophen (TYLENOL 8 HOUR PO) Take  by mouth As Needed.      allopurinol (ZYLOPRIM) 300 MG tablet Take 1 tablet by mouth Daily.      atenolol (TENORMIN) 50 MG tablet Take 1 tablet by mouth Daily. 90 tablet 2    atorvastatin (LIPITOR) 20 MG tablet TAKE ONE TABLET BY MOUTH ONCE NIGHTLY 90 tablet 2    Cholecalciferol (VITAMIN D3) 5000 units chewable tablet Chew 5,000 Units Daily.      empagliflozin (Jardiance) 10 MG tablet tablet Take 1 tablet by mouth Daily.      furosemide (LASIX) 40 MG tablet Take 1 tablet by mouth Daily. 90 tablet 2    glucose blood test strip Use to test glucose once daily. Dx DM 2 E11.9 100 each 11    imipramine (TOFRANIL) 25 MG tablet Take 3 tablets by mouth Every Night. 270 tablet 2    levothyroxine (SYNTHROID, LEVOTHROID) 100 MCG tablet Take 1 tablet by mouth Daily. 90 tablet 3    multivitamin with minerals (ONE-A-DAY WOMENS PO) Take 1 tablet by mouth Daily.      Nutritional Supplements (Silica) 12.5 MG capsule       omeprazole (priLOSEC) 40 MG capsule Take 1 capsule by mouth Daily. 90 capsule 3    Ozempic, 2 MG/DOSE, 8 MG/3ML solution pen-injector DIAL AND INJECT UNDER THE SKIN 2 MG WEEKLY 3 mL 5    Peppermint Oil (IBgard) 90 MG capsule controlled-release       Probiotic Product (PROBIOTIC PO) Take  by mouth.      Probiotic Product capsule       valsartan (DIOVAN) 160 MG tablet TAKE 2 TABLETS BY MOUTH DAILY 90 tablet 2        Past Medical History:   Diagnosis Date    Anxiety     Arthritis     Cardiomegaly     MILD    Cataract      Chronic kidney disease     Colon polyps     FOLLOWED BY DR. LUCY HILL    DDD (degenerative disc disease), lumbar     Diabetes mellitus 2021    Disease of thyroid gland     HYPOTHYROIDISM    Diverticulitis of colon     Elevated cholesterol     Excessive sleepiness     Fatty liver     GERD (gastroesophageal reflux disease)     Gout     Hard to intubate     History of cardiomegaly     History of leukocytosis     History of vitamin D deficiency     Hot flashes     HPV in female     Hyperlipidemia     Hypertension     Hypothyroidism     Insomnia     Leiomyoma     Low back pain     Lumbar radiculopathy     Lung disease     Obesity     DEILA (obstructive sleep apnea)     Panic disorder     Plantar fasciitis     Postmenopausal HRT (hormone replacement therapy)     PROGESTERONE AND ESTRADIAL    Pulmonary nodules     RIGHT MIDDLE LOBE    Renal insufficiency     Rosacea     Steatosis of liver     Tachycardia     Type 2 diabetes mellitus 2020     Past Surgical History:   Procedure Laterality Date    BARIATRIC SURGERY  2006    Removed lapband 2010    CHOLECYSTECTOMY N/A 03/12/2002    WITH CHOLANGIOGRAM, DR. LYNNETTE CHAVEZ AT [facility]    COLONOSCOPY N/A 10/04/2016    4 SESSILE TUBULAR ADENOMA POLYPS IN HEPATIC FLEXURE, 1 SESSILE TUBULAR ADENOMA AND 1 HYPERPLASTIC POLYP IN RECTUM, 6 MM SESSILE HYPERPLASTIC POLYP IN SIGMOID, 2 SESSILE HYPERPLASTI POLYPS IN RECTUM, MULTIPLE SMALL AND LARGE DIVERTICULA, RESCOPE IN 3 YRS, DR. LUCY HILL AT [facility]    COLONOSCOPY N/A 08/19/2003    ENTIRE COLON WNL, DR. ARIAS THOMASON AT [facility]    COLONOSCOPY N/A 10/07/2008    MULTIPLE LARGE MOUTHED DIVERTICULA IN SIGMOID, RESCOPE IN 5 YRS, DR. ARIAS THOMASON AT [facility]    COLONOSCOPY N/A 06/21/2011    DIVERTICULOSIS, MILD COLONIC SPASM, RESCOPE IN 5 YRS, DR. ARIAS THOMASON AT [facility]    COLONOSCOPY N/A 10/10/2019    5 MM HYPERPLASTIC POLYP IN CECUM, 5 MM HYPERPLASTIC POLYP IN ASCENDING, 5 MM HYPERPLASTIC POLYP IN TRANSVERSE, 5 MM HYPERPLASTIC POLYP IN DESCENDING,  RESCOPE IN 3 YRS, DR. LUCY HILL AT [facility]    COLONOSCOPY N/A 11/29/2022    Procedure: COLONOSCOPY to cecum and TI;  with biopsies, cold bx polyps,;  Surgeon: Kathy Araya MD;  Location: [facility] ENDOSCOPY;  Service: Gastroenterology;  Laterality: N/A;  pre:  Diarrhea, left lower quadrant pain, abnormal CT scan of the sigmoid colon  post:  polyps, diverticulosis, abnormal colon mucosa, hemorrhoids    COSMETIC SURGERY N/A 1995    ABDOMINOPLASTY AND LIPOSUCTION, DR. MAUREEN WATERHOUSE    COSMETIC SURGERY Bilateral 1997    ARM REDUCTION, BRACHIOPLASTY, DR. MAUREEN WATERHOUSE    COSMETIC SURGERY Bilateral     UPPER EYELID BLEPHAROPLASTY    COSMETIC SURGERY N/A 10/13/2000    LIPECTOMY OF ABDOMINAL WALL, DR. MAUREEN WATERHOUSE    CYSTOSCOPY N/A 03/19/2014    DR. JERMAINE MURRIETA AT [facility]    DIAGNOSTIC LAPAROSCOPY N/A 06/04/2010    DEBRIDEMENT OF ABDOMINAL WALL, DR. SILVERIO AT [facility]    DILATATION AND CURETTAGE N/A 02/2003    ENDOSCOPY N/A 01/13/2006    REFLUX ESOPHAGITIS, OTHERWISE WNL, DR. JOSE J WELLS AT [facility]    ENDOSCOPY N/A 11/29/2022    Procedure: ESOPHAGOGASTRODUODENOSCOPY iwth biopsies;  Surgeon: Kathy Araya MD;  Location: [facility] ENDOSCOPY;  Service: Gastroenterology;  Laterality: N/A;  Pre:  epigastric pain  post:  gastritis, esophagitis,     GASTRIC BANDING REMOVAL N/A 07/2011    GASTRIC PORT CHANGE N/A     DR. SILVERIO AT [facility]    HERNIA REPAIR N/A 03/12/2002    VENTRAL HERNIA REPAIR, DR. KAITLYNN CHAVEZ AT [facility]    LAPAROSCOPIC GASTRIC BANDING N/A 02/21/2017    DR. SILVERIO AT [facility]    LAPAROSCOPIC LYSIS OF ADHESIONS N/A 11/05/2018    DR. LORI HILL AT [place]    SHOULDER ARTHROSCOPY W/ LABRAL REPAIR Left     AND DEBRIDEMENT OF ROTATOR CUFF    TENSION FREE VAGINAL TAPING WITH MINI ARC SLING N/A 03/19/2004    DR. JERMAINE MURRIETA AT [facility]    TUBAL ABDOMINAL LIGATION Bilateral 1982    UPPER GASTROINTESTINAL ENDOSCOPY  2022     Family History   Problem Relation Age of  "Onset    Heart murmur Mother     Hyperlipidemia Mother     Heart disease Mother     Hypertension Mother     Alcohol abuse Father     Liver disease Father     Cirrhosis Father     COPD Father     Liver cancer Father     Hypertension Sister     Heart disease Sister     Skin cancer Sister     Heart disease Brother     Basal cell carcinoma Brother     Hypertension Brother     Diabetes Brother     Gout Brother     Prostate cancer Brother     Obesity Brother     Skin cancer Brother     Prostate cancer Brother     Cancer Maternal Grandmother     Suicidality Paternal Grandfather     Valvular heart disease Daughter     No Known Problems Son     Prostate cancer Maternal Uncle     Colon cancer Paternal Aunt     reports that she quit smoking about 32 years ago. Her smoking use included cigarettes. She started smoking about 52 years ago. She has a 10 pack-year smoking history. She has never used smokeless tobacco. She reports current alcohol use. She reports that she does not use drugs.    OBJECTIVE    Vital Signs:   /62   Pulse 100   Temp 98.9 °F (37.2 °C) (Oral)   Ht 147.3 cm (57.99\")   Wt 67 kg (147 lb 9.6 oz)   SpO2 95%   BMI 30.86 kg/m²     Physical Exam  Constitutional:       Appearance: Normal appearance.   HENT:      Right Ear: Tympanic membrane normal.      Left Ear: Tympanic membrane normal.      Mouth/Throat:      Mouth: Mucous membranes are moist.      Pharynx: Posterior oropharyngeal erythema present.   Cardiovascular:      Rate and Rhythm: Normal rate and regular rhythm.      Heart sounds: Normal heart sounds. No murmur heard.  Pulmonary:      Effort: Pulmonary effort is normal.      Breath sounds: Normal breath sounds.   Skin:     General: Skin is warm and dry.   Neurological:      Mental Status: She is alert.   Psychiatric:         Mood and Affect: Mood normal.         Behavior: Behavior normal.         Thought Content: Thought content normal.        Hoarse cough    The following data was reviewed by: " Leon Layton MD on 11/15/2024:  CMP          7/26/2024    08:04 9/12/2024    09:54 10/29/2024    12:54   CMP   Glucose 104   111    BUN 21   21    Creatinine 1.43  1.80  1.52    EGFR 39.3   36.3    Sodium 134   139    Potassium 3.1   3.9    Chloride 98   99    Calcium 10.2   9.5    Total Protein 6.5   6.5    Albumin 3.6   3.8    Globulin 2.9   2.7    Total Bilirubin 0.5   0.6    Alkaline Phosphatase 162   174    AST (SGOT) 22   22    ALT (SGPT) 18   13    Albumin/Globulin Ratio 1.2   1.4    BUN/Creatinine Ratio 14.7   13.8    Anion Gap 12.2   11.0      CBC w/diff          6/24/2024    12:52 7/26/2024    08:04 10/29/2024    12:54   CBC w/Diff   WBC 10.23  10.95  10.22    RBC 5.23  4.82  4.53    Hemoglobin 15.7  14.6  14.3    Hematocrit 45.7  42.1  42.4    MCV 87.4  87.3  93.6    MCH 30.0  30.3  31.6    MCHC 34.4  34.7  33.7    RDW 15.5  14.6  15.5    Platelets 326  302  407    Neutrophil Rel % 57.8  61.5  54.8    Immature Granulocyte Rel % 2.1  0.5  1.6    Lymphocyte Rel % 28.4  25.9  31.0    Monocyte Rel % 8.8  11.6  7.5    Eosinophil Rel % 2.0  0.1  4.1    Basophil Rel % 0.9  0.4  1.0      Lipid Panel          1/25/2024    09:37 3/19/2024    10:40   Lipid Panel   Total Cholesterol 141  118    Triglycerides 314  222    HDL Cholesterol 32  31    VLDL Cholesterol 49  36    LDL Cholesterol  60  51    LDL/HDL Ratio 1.44       TSH          5/22/2024    09:50 7/8/2024    09:24 8/5/2024    13:27   TSH   TSH 0.102  0.074  0.197      A1C Last 3 Results          1/25/2024    09:37 3/19/2024    10:40   HGBA1C Last 3 Results   Hemoglobin A1C 5.50  7.30      Data reviewed : recent oncology notes              ASSESSMENT & PLAN     Diagnoses and all orders for this visit:    1. Acute cough (Primary)  -     benzonatate (TESSALON) 200 MG capsule; Take 1 capsule by mouth 3 (Three) Times a Day As Needed for Cough.  Dispense: 90 capsule; Refill: 0  -     guaiFENesin-codeine (GUAIFENESIN AC) 100-10 MG/5ML liquid; Take 5 mL by mouth 3  (Three) Times a Day As Needed for Cough.  Dispense: 118 mL; Refill: 0      Explained lack of efficacy of antibiotics in viral disease.  Antitussives per medication orders.  Call if shortness of breath worsens, blood in sputum, change in character of cough, development of fever or chills, inability to maintain nutrition and hydration. Avoid exposure to tobacco smoke and fumes.     Health Maintenance Due   Topic Date Due    TDAP/TD VACCINES (1 - Tdap) Never done    ZOSTER VACCINE (1 of 2) Never done    ANNUAL WELLNESS VISIT  03/31/2024    INFLUENZA VACCINE  Never done    COVID-19 Vaccine (3 - 2024-25 season) 09/01/2024    DIABETIC EYE EXAM  10/02/2024        Follow Up  No follow-ups on file.    Patient/family had no further questions at this time and verbalized understanding of the plan discussed today.     Answers submitted by the patient for this visit:  Primary Reason for Visit (Submitted on 11/14/2024)  What is the primary reason for your visit?: Cough  Cough Questionnaire (Submitted on 11/14/2024)  Chief Complaint: Cough  Chronicity: recurrent  Onset: in the past 7 days  Progression since onset: resolved  Frequency: hourly  Cough characteristics: rattling  chest pain: No  chills: No  ear congestion: No  ear pain: No  fever: No  headaches: No  heartburn: No  hemoptysis: No  myalgias: Yes  nasal congestion: No  postnasal drip: No  rash: No  rhinorrhea: No  shortness of breath: Yes  sore throat: No  sweats: No  weight loss: No  wheezing: No  Aggravated by: lying down  Risk factors for lung disease: animal exposure, travel

## 2024-12-04 ENCOUNTER — OFFICE VISIT (OUTPATIENT)
Dept: INTERNAL MEDICINE | Facility: CLINIC | Age: 74
End: 2024-12-04
Payer: MEDICARE

## 2024-12-04 VITALS
HEIGHT: 58 IN | DIASTOLIC BLOOD PRESSURE: 58 MMHG | HEART RATE: 66 BPM | WEIGHT: 145.2 LBS | OXYGEN SATURATION: 97 % | SYSTOLIC BLOOD PRESSURE: 110 MMHG | BODY MASS INDEX: 30.48 KG/M2

## 2024-12-04 DIAGNOSIS — F51.01 PRIMARY INSOMNIA: ICD-10-CM

## 2024-12-04 DIAGNOSIS — I10 PRIMARY HYPERTENSION: Primary | ICD-10-CM

## 2024-12-04 PROCEDURE — 3051F HG A1C>EQUAL 7.0%<8.0%: CPT | Performed by: STUDENT IN AN ORGANIZED HEALTH CARE EDUCATION/TRAINING PROGRAM

## 2024-12-04 PROCEDURE — 3078F DIAST BP <80 MM HG: CPT | Performed by: STUDENT IN AN ORGANIZED HEALTH CARE EDUCATION/TRAINING PROGRAM

## 2024-12-04 PROCEDURE — 3074F SYST BP LT 130 MM HG: CPT | Performed by: STUDENT IN AN ORGANIZED HEALTH CARE EDUCATION/TRAINING PROGRAM

## 2024-12-04 PROCEDURE — 99214 OFFICE O/P EST MOD 30 MIN: CPT | Performed by: STUDENT IN AN ORGANIZED HEALTH CARE EDUCATION/TRAINING PROGRAM

## 2024-12-04 PROCEDURE — 1126F AMNT PAIN NOTED NONE PRSNT: CPT | Performed by: STUDENT IN AN ORGANIZED HEALTH CARE EDUCATION/TRAINING PROGRAM

## 2024-12-04 RX ORDER — ATENOLOL 25 MG/1
25 TABLET ORAL DAILY
Qty: 90 TABLET | Refills: 2 | Status: SHIPPED | OUTPATIENT
Start: 2024-12-04

## 2024-12-04 RX ORDER — FINASTERIDE 5 MG/1
TABLET, FILM COATED ORAL
COMMUNITY
Start: 2024-11-27

## 2024-12-04 RX ORDER — VALSARTAN 160 MG/1
160 TABLET ORAL DAILY
Qty: 90 TABLET | Refills: 2 | Status: SHIPPED | OUTPATIENT
Start: 2024-12-04

## 2024-12-04 NOTE — PROGRESS NOTES
Leon Layton M.D.  Internal Medicine  [facility]  [address], [address]  [place] [address]  [phone]      Chief Complaint  Diabetes (6 MTH F/U /)    SUBJECTIVE    History of Present Illness    Sravanthi Burns is a 74 y.o. female with CKD, hypothyroidism, GERD, hyperlipidemia, hypertension who presents to the office today as an established patient that last saw me on 11/15/2024.     Stage III CKD-follows with [facility]. Started on Jardiance.      Goes to hormone clinic. On progesterone, estrogen patched and T pellets.      Hypothyroidism-on replacement. 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  She is now following with Dr. Kraft with endocrinology. Always anxious but denies symptoms otherwise.      Type 2 diabetes -she is on Ozempic.  Now on Jardiance per nephrology. Weight stable.     History of Gout     Anxiety-on imipramine long term. Trialed multipme medicaitons in past but had side effects. Takes at night. More irritable lately. Denies exessive worry or dread.      Hypertension-on valsartan and atenolol. Below 100 a few times last week. Had to stop BP medication in past but to low BP. Feels dizzy at times. Dizzy when standing up.      Hyperlipidemia-on atorvastatin    Recently diagnosed with breast cancer.  She had surgery and radiation is planned in [place]. Does not what hormone therapy. Worried about weak bones and aging. Cough improving. Done with treatment.     Going to see Dr. Diaz.     Very  fatigued. Feels sleepy. Could fall asleep during day but does not let herself. Tred at night. Falls asleep easily but can't go back sleep at 1-2 am. Adherent to CPAP but took off the other night and slept well after. Tosses and turns. No pain. Always stressed. Not more anxious with cancer. Denies depression. Not sure how much sleep she gets. Goes upstairs at 8 pm. Watches TV in bed. Goes to at 10 pm-11pm. Wakes up at 8a-9am. Some  "caffeine use in AM or lunch. No alcohol.       Review of Systems    Allergies   Allergen Reactions    Other Dermatitis, Itching and Swelling     \"surgical glue\"    Wound Dressing Adhesive Itching    Duloxetine Other (See Comments)     Other reaction(s): Decreased libido  DECREASED LIBIDO    Erythromycin Nausea Only    Formaldehyde Itching    Minocycline Swelling     GENERALIZED    Nsaids Other (See Comments)     Avoids due to CKD     Tea Tree Oil Other (See Comments)     Found during allergy test    Venlafaxine Other (See Comments)     Other reaction(s): Decreased libido  DECREASED LIBIDO.        Outpatient Medications Marked as Taking for the 12/4/24 encounter (Office Visit) with Leon Layton MD   Medication Sig Dispense Refill    Acetaminophen (TYLENOL 8 HOUR PO) Take  by mouth As Needed.      allopurinol (ZYLOPRIM) 300 MG tablet Take 1 tablet by mouth Daily.      atenolol (TENORMIN) 25 MG tablet Take 1 tablet by mouth Daily. 90 tablet 2    atorvastatin (LIPITOR) 20 MG tablet TAKE ONE TABLET BY MOUTH ONCE NIGHTLY 90 tablet 2    benzonatate (TESSALON) 200 MG capsule Take 1 capsule by mouth 3 (Three) Times a Day As Needed for Cough. 90 capsule 0    Cholecalciferol (VITAMIN D3) 5000 units chewable tablet Chew 5,000 Units Daily.      empagliflozin (Jardiance) 10 MG tablet tablet Take 1 tablet by mouth Daily.      finasteride (PROSCAR) 5 MG tablet       furosemide (LASIX) 40 MG tablet Take 1 tablet by mouth Daily. 90 tablet 2    glucose blood test strip Use to test glucose once daily. Dx DM 2 E11.9 100 each 11    imipramine (TOFRANIL) 25 MG tablet Take 3 tablets by mouth Every Night. 270 tablet 2    levothyroxine (SYNTHROID, LEVOTHROID) 100 MCG tablet Take 1 tablet by mouth Daily. 90 tablet 3    multivitamin with minerals (ONE-A-DAY WOMENS PO) Take 1 tablet by mouth Daily.      Nutritional Supplements (Silica) 12.5 MG capsule       omeprazole (priLOSEC) 40 MG capsule Take 1 capsule by mouth Daily. 90 capsule 3    " Ozempic, 2 MG/DOSE, 8 MG/3ML solution pen-injector DIAL AND INJECT UNDER THE SKIN 2 MG WEEKLY 3 mL 5    Peppermint Oil (IBgard) 90 MG capsule controlled-release       Probiotic Product (PROBIOTIC PO) Take  by mouth.      Probiotic Product capsule       valsartan (DIOVAN) 160 MG tablet Take 1 tablet by mouth Daily. 90 tablet 2    [DISCONTINUED] atenolol (TENORMIN) 50 MG tablet Take 1 tablet by mouth Daily. 90 tablet 2    [DISCONTINUED] valsartan (DIOVAN) 160 MG tablet TAKE 2 TABLETS BY MOUTH DAILY 90 tablet 2        Past Medical History:   Diagnosis Date    Anxiety     Arthritis     Cardiomegaly     MILD    Cataract     Chronic kidney disease     Colon polyps     FOLLOWED BY DR. LUCY HILL    DDD (degenerative disc disease), lumbar     Diabetes mellitus 2021    Disease of thyroid gland     HYPOTHYROIDISM    Diverticulitis of colon     Elevated cholesterol     Excessive sleepiness     Fatty liver     GERD (gastroesophageal reflux disease)     Gout     Hard to intubate     History of cardiomegaly     History of leukocytosis     History of vitamin D deficiency     Hot flashes     HPV in female     Hyperlipidemia     Hypertension     Hypothyroidism     Insomnia     Leiomyoma     Low back pain     Lumbar radiculopathy     Lung disease     Obesity     DELIA (obstructive sleep apnea)     Panic disorder     Plantar fasciitis     Postmenopausal HRT (hormone replacement therapy)     PROGESTERONE AND ESTRADIAL    Pulmonary nodules     RIGHT MIDDLE LOBE    Renal insufficiency     Rosacea     Steatosis of liver     Tachycardia     Type 2 diabetes mellitus 2020     Past Surgical History:   Procedure Laterality Date    BARIATRIC SURGERY  2006    Removed lapband 2010    CHOLECYSTECTOMY N/A 03/12/2002    WITH CHOLANGIOGRAM, DR. LYNNETTE CHAVEZ AT [facility]    COLONOSCOPY N/A 10/04/2016    4 SESSILE TUBULAR ADENOMA POLYPS IN HEPATIC FLEXURE, 1 SESSILE TUBULAR ADENOMA AND 1 HYPERPLASTIC POLYP IN RECTUM, 6 MM SESSILE HYPERPLASTIC POLYP IN  SIGMOID, 2 SESSILE HYPERPLASTI POLYPS IN RECTUM, MULTIPLE SMALL AND LARGE DIVERTICULA, RESCOPE IN 3 YRS, DR. LUCY HILL AT [facility]    COLONOSCOPY N/A 08/19/2003    ENTIRE COLON WNL, DR. ARIAS THOMASON AT [facility]    COLONOSCOPY N/A 10/07/2008    MULTIPLE LARGE MOUTHED DIVERTICULA IN SIGMOID, RESCOPE IN 5 YRS, DR. ARIAS THOMASON AT [facility]    COLONOSCOPY N/A 06/21/2011    DIVERTICULOSIS, MILD COLONIC SPASM, RESCOPE IN 5 YRS, DR. ARIAS THOMASON AT [facility]    COLONOSCOPY N/A 10/10/2019    5 MM HYPERPLASTIC POLYP IN CECUM, 5 MM HYPERPLASTIC POLYP IN ASCENDING, 5 MM HYPERPLASTIC POLYP IN TRANSVERSE, 5 MM HYPERPLASTIC POLYP IN DESCENDING, RESCOPE IN 3 YRS, DR. LUCY HILL AT [facility]    COLONOSCOPY N/A 11/29/2022    Procedure: COLONOSCOPY to cecum and TI;  with biopsies, cold bx polyps,;  Surgeon: Kathy Araya MD;  Location: [facility] ENDOSCOPY;  Service: Gastroenterology;  Laterality: N/A;  pre:  Diarrhea, left lower quadrant pain, abnormal CT scan of the sigmoid colon  post:  polyps, diverticulosis, abnormal colon mucosa, hemorrhoids    COSMETIC SURGERY N/A 1995    ABDOMINOPLASTY AND LIPOSUCTION, DR. MAUREEN WATERHOUSE    COSMETIC SURGERY Bilateral 1997    ARM REDUCTION, BRACHIOPLASTY, DR. MAUREEN WATERHOUSE    COSMETIC SURGERY Bilateral     UPPER EYELID BLEPHAROPLASTY    COSMETIC SURGERY N/A 10/13/2000    LIPECTOMY OF ABDOMINAL WALL, DR. MAUREEN WATERHOUSE    CYSTOSCOPY N/A 03/19/2014    DR. JERMAINE MURRIETA AT [facility]    DIAGNOSTIC LAPAROSCOPY N/A 06/04/2010    DEBRIDEMENT OF ABDOMINAL WALL, DR. SILVERIO AT [facility]    DILATATION AND CURETTAGE N/A 02/2003    ENDOSCOPY N/A 01/13/2006    REFLUX ESOPHAGITIS, OTHERWISE WNL, DR. JOSE J WELLS AT [facility]    ENDOSCOPY N/A 11/29/2022    Procedure: ESOPHAGOGASTRODUODENOSCOPY iwth biopsies;  Surgeon: Kathy Araya MD;  Location: [facility] ENDOSCOPY;  Service: Gastroenterology;  Laterality: N/A;  Pre:  epigastric pain  post:  gastritis, esophagitis,     GASTRIC BANDING REMOVAL N/A 07/2011  "   GASTRIC PORT CHANGE N/A     DR. SILVERIO AT [facility] [place]    HERNIA REPAIR N/A 03/12/2002    VENTRAL HERNIA REPAIR, DR. KAITLYNN CHAVEZ AT [facility]    LAPAROSCOPIC GASTRIC BANDING N/A 02/21/2017    DR. SILVERIO AT [facility]    LAPAROSCOPIC LYSIS OF ADHESIONS N/A 11/05/2018    DR. LORI HILL AT [place]    SHOULDER ARTHROSCOPY W/ LABRAL REPAIR Left     AND DEBRIDEMENT OF ROTATOR CUFF    TENSION FREE VAGINAL TAPING WITH MINI ARC SLING N/A 03/19/2004    DR. JERMAINE MURRIETA AT [facility]    TUBAL ABDOMINAL LIGATION Bilateral 1982    UPPER GASTROINTESTINAL ENDOSCOPY  2022     Family History   Problem Relation Age of Onset    Heart murmur Mother     Hyperlipidemia Mother     Heart disease Mother     Hypertension Mother     Alcohol abuse Father     Liver disease Father     Cirrhosis Father     COPD Father     Liver cancer Father     Hypertension Sister     Heart disease Sister     Skin cancer Sister     Heart disease Brother     Basal cell carcinoma Brother     Hypertension Brother     Diabetes Brother     Gout Brother     Prostate cancer Brother     Obesity Brother     Skin cancer Brother     Prostate cancer Brother     Cancer Maternal Grandmother     Suicidality Paternal Grandfather     Valvular heart disease Daughter     No Known Problems Son     Prostate cancer Maternal Uncle     Colon cancer Paternal Aunt     reports that she quit smoking about 32 years ago. Her smoking use included cigarettes. She started smoking about 52 years ago. She has a 10 pack-year smoking history. She has never used smokeless tobacco. She reports current alcohol use. She reports that she does not use drugs.    OBJECTIVE    Vital Signs:   /58   Pulse 66   Ht 147.3 cm (57.99\")   Wt 65.9 kg (145 lb 3.2 oz)   SpO2 97%   BMI 30.36 kg/m²     Physical Exam  Constitutional:       Appearance: Normal appearance. She is normal weight.   Cardiovascular:      Rate and Rhythm: Normal rate and regular rhythm.      Heart sounds: Normal heart " sounds. No murmur heard.  Pulmonary:      Effort: Pulmonary effort is normal.      Breath sounds: Normal breath sounds.   Skin:     General: Skin is warm and dry.   Neurological:      Mental Status: She is alert.   Psychiatric:         Mood and Affect: Mood normal.         Behavior: Behavior normal.         Thought Content: Thought content normal.            The following data was reviewed by: Leon Layton MD on 12/04/2024:  CMP          7/26/2024    08:04 9/12/2024    09:54 10/29/2024    12:54   CMP   Glucose 104   111    BUN 21   21    Creatinine 1.43  1.80  1.52    EGFR 39.3   36.3    Sodium 134   139    Potassium 3.1   3.9    Chloride 98   99    Calcium 10.2   9.5    Total Protein 6.5   6.5    Albumin 3.6   3.8    Globulin 2.9   2.7    Total Bilirubin 0.5   0.6    Alkaline Phosphatase 162   174    AST (SGOT) 22   22    ALT (SGPT) 18   13    Albumin/Globulin Ratio 1.2   1.4    BUN/Creatinine Ratio 14.7   13.8    Anion Gap 12.2   11.0      CBC w/diff          6/24/2024    12:52 7/26/2024    08:04 10/29/2024    12:54   CBC w/Diff   WBC 10.23  10.95  10.22    RBC 5.23  4.82  4.53    Hemoglobin 15.7  14.6  14.3    Hematocrit 45.7  42.1  42.4    MCV 87.4  87.3  93.6    MCH 30.0  30.3  31.6    MCHC 34.4  34.7  33.7    RDW 15.5  14.6  15.5    Platelets 326  302  407    Neutrophil Rel % 57.8  61.5  54.8    Immature Granulocyte Rel % 2.1  0.5  1.6    Lymphocyte Rel % 28.4  25.9  31.0    Monocyte Rel % 8.8  11.6  7.5    Eosinophil Rel % 2.0  0.1  4.1    Basophil Rel % 0.9  0.4  1.0      Lipid Panel          1/25/2024    09:37 3/19/2024    10:40   Lipid Panel   Total Cholesterol 141  118    Triglycerides 314  222    HDL Cholesterol 32  31    VLDL Cholesterol 49  36    LDL Cholesterol  60  51    LDL/HDL Ratio 1.44       TSH          5/22/2024    09:50 7/8/2024    09:24 8/5/2024    13:27   TSH   TSH 0.102  0.074  0.197      A1C Last 3 Results          1/25/2024    09:37 3/19/2024    10:40   HGBA1C Last 3 Results   Hemoglobin  A1C 5.50  7.30      Data reviewed : Note from [place]              ASSESSMENT & PLAN     Diagnoses and all orders for this visit:    1. Primary hypertension (Primary)  -     valsartan (DIOVAN) 160 MG tablet; Take 1 tablet by mouth Daily.  Dispense: 90 tablet; Refill: 2  -     atenolol (TENORMIN) 25 MG tablet; Take 1 tablet by mouth Daily.  Dispense: 90 tablet; Refill: 2    Diastolic BP low  Call in 1 week with log  Decrease valsartan if indicated    2. Primary insomnia      Discussed sleep hygiene measures including regular sleep schedule, optimal sleep environment, and relaxing presleep rituals.  Avoid daytime naps.  Avoid caffeine after noon.  Avoid excess alcohol.  Avoid tobacco.  Recommended daily exercise.          Health Maintenance Due   Topic Date Due    TDAP/TD VACCINES (1 - Tdap) Never done    ZOSTER VACCINE (1 of 2) Never done    ANNUAL WELLNESS VISIT  03/31/2024    INFLUENZA VACCINE  Never done    COVID-19 Vaccine (3 - 2024-25 season) 09/01/2024    DIABETIC EYE EXAM  10/02/2024    HEMOGLOBIN A1C  02/22/2025        Follow Up  Return in about 6 months (around 6/4/2025) for Medicare Wellness.    Patient/family had no further questions at this time and verbalized understanding of the plan discussed today.

## 2024-12-05 ENCOUNTER — PRIOR AUTHORIZATION (OUTPATIENT)
Dept: ENDOCRINOLOGY | Age: 74
End: 2024-12-05
Payer: MEDICARE

## 2024-12-05 ENCOUNTER — TELEPHONE (OUTPATIENT)
Dept: ENDOCRINOLOGY | Age: 74
End: 2024-12-05
Payer: MEDICARE

## 2024-12-05 NOTE — TELEPHONE ENCOUNTER
Called and spoke with pt. Informed pt I tried to submit a PA for her Ozempic and it said it wasn't needed. Informed pt if she has an issue filling medication to call the office. Pt voiced understanding and had no further questions or concerns.

## 2024-12-05 NOTE — TELEPHONE ENCOUNTER
Caller: Sravanthi Burns    Relationship to patient: Self      Best call back number: [phone]    Provider: Greg Kraft, DO     Medication PA needed: Ozempic, 2 MG/DOSE, 8 MG/3ML solution pen-injector     Reason for call/Prior Auth: HUMANALBERTO SENT PT A LETTER STATING SHE NEEDED A PRIOR AUTH FOR THE OZEMPIC.

## 2024-12-30 ENCOUNTER — TELEPHONE (OUTPATIENT)
Dept: ONCOLOGY | Facility: CLINIC | Age: 74
End: 2024-12-30

## 2024-12-30 NOTE — TELEPHONE ENCOUNTER
Caller: Sravanthi Burns    Relationship: Self        What was the call regarding: CANCELLED  FOLLOW UP 1  AND LAB WITH DR PATEL 01/16 , DUE TO NOT NEEDED AT THIS TIME AND DID NOT NEED TO RESCHEDULE, WILL BE SEEING ONCOLOGIST AT [facility] FOR BREAST CANCER

## 2025-01-27 DIAGNOSIS — N18.4 TYPE 2 DIABETES MELLITUS WITH STAGE 4 CHRONIC KIDNEY DISEASE, WITHOUT LONG-TERM CURRENT USE OF INSULIN: Chronic | ICD-10-CM

## 2025-01-27 DIAGNOSIS — E11.22 TYPE 2 DIABETES MELLITUS WITH STAGE 4 CHRONIC KIDNEY DISEASE, WITHOUT LONG-TERM CURRENT USE OF INSULIN: Chronic | ICD-10-CM

## 2025-01-27 RX ORDER — SEMAGLUTIDE 2.68 MG/ML
INJECTION, SOLUTION SUBCUTANEOUS
Qty: 3 ML | Refills: 5 | Status: SHIPPED | OUTPATIENT
Start: 2025-01-27

## 2025-01-27 NOTE — TELEPHONE ENCOUNTER
Rx Refill Note  Requested Prescriptions     Pending Prescriptions Disp Refills    Ozempic, 2 MG/DOSE, 8 MG/3ML solution pen-injector [Pharmacy Med Name: OZEMPIC 2 MG/DOSE (8 MG/3 ML)] 3 mL 5     Sig: DIAL AND INJECT UNDER THE SKIN 2 MG WEEKLY      Last office visit with prescribing clinician: 9/11/2024   Last telemedicine visit with prescribing clinician: Visit date not found   Next office visit with prescribing clinician: 3/12/2025                         Would you like a call back once the refill request has been completed: [] Yes [] No    If the office needs to give you a call back, can they leave a voicemail: [] Yes [] No    Lucy Cevallos  01/27/25, 10:52 EST

## 2025-02-27 DIAGNOSIS — F41.0 PANIC DISORDER WITHOUT AGORAPHOBIA: ICD-10-CM

## 2025-02-27 RX ORDER — IMIPRAMINE HYDROCHLORIDE 25 MG/1
TABLET, FILM COATED ORAL
Qty: 270 TABLET | Refills: 2 | Status: SHIPPED | OUTPATIENT
Start: 2025-02-27

## 2025-03-12 ENCOUNTER — OFFICE VISIT (OUTPATIENT)
Dept: ENDOCRINOLOGY | Age: 75
End: 2025-03-12
Payer: MEDICARE

## 2025-03-12 VITALS
BODY MASS INDEX: 31.53 KG/M2 | SYSTOLIC BLOOD PRESSURE: 124 MMHG | HEIGHT: 58 IN | OXYGEN SATURATION: 96 % | DIASTOLIC BLOOD PRESSURE: 78 MMHG | HEART RATE: 91 BPM | WEIGHT: 150.2 LBS

## 2025-03-12 DIAGNOSIS — E03.9 HYPOTHYROIDISM, UNSPECIFIED TYPE: ICD-10-CM

## 2025-03-12 DIAGNOSIS — N18.4 TYPE 2 DIABETES MELLITUS WITH STAGE 4 CHRONIC KIDNEY DISEASE, WITHOUT LONG-TERM CURRENT USE OF INSULIN: Primary | ICD-10-CM

## 2025-03-12 DIAGNOSIS — E11.22 TYPE 2 DIABETES MELLITUS WITH STAGE 4 CHRONIC KIDNEY DISEASE, WITHOUT LONG-TERM CURRENT USE OF INSULIN: Primary | ICD-10-CM

## 2025-03-12 LAB
HBA1C MFR BLD: 5.5 % (ref 4.8–5.6)
REPORT: NORMAL
TSH SERPL DL<=0.005 MIU/L-ACNC: 0.26 UIU/ML (ref 0.27–4.2)

## 2025-03-12 RX ORDER — LANCETS 33 GAUGE
EACH MISCELLANEOUS
Qty: 100 EACH | Refills: 3 | Status: SHIPPED | OUTPATIENT
Start: 2025-03-12

## 2025-03-12 NOTE — PROGRESS NOTES
Chief complaint/Reason for consult: T2DM    HPI:   - 74 year old female here for management of diabetes mellitus type 2  - Last seen in 9/2024  - No change since last visit  - Has had diabetes since 2021  - No known complications to date (she does have CKD but it is unclear if this is from her diabetes or not)  - Is currently taking Ozempic 1 mg weekly and Jardiance 10 mg daily  - Denies hypoglycemia  - Glucometer/Insulin pump review shows  - Is on levothyroxine 137 mcg daily  - She also has CRYSTAL and sees an ophthalmologist    The following portions of the patient's history were reviewed and updated as appropriate: allergies, current medications, past family history, past medical history, past social history, past surgical history, and problem list.      Objective     Vitals:    03/12/25 0917   BP: 124/78   Pulse: 91   SpO2: 96%        Physical Exam  Vitals reviewed.   Constitutional:       Appearance: Normal appearance. She is obese.   HENT:      Head: Normocephalic and atraumatic.   Eyes:      General: No scleral icterus.  Pulmonary:      Effort: Pulmonary effort is normal. No respiratory distress.   Neurological:      Mental Status: She is alert.      Gait: Gait normal.   Psychiatric:         Mood and Affect: Mood normal.         Behavior: Behavior normal.         Thought Content: Thought content normal.         Judgment: Judgment normal.         Assessment & Plan   T2DM, controlled  2.   Obesity (BMI of 31.4)  - Cont. Ozempic 2 mg weekly and Jardiance 10 mg daily  - Will check A1c     3. Hypothyroidism  - Cont. Levothyroxine 100 mcg daily, will check TSH     - Return to clinic in 6 months

## 2025-03-14 DIAGNOSIS — N18.4 TYPE 2 DIABETES MELLITUS WITH STAGE 4 CHRONIC KIDNEY DISEASE, WITHOUT LONG-TERM CURRENT USE OF INSULIN: Primary | ICD-10-CM

## 2025-03-14 DIAGNOSIS — E11.22 TYPE 2 DIABETES MELLITUS WITH STAGE 4 CHRONIC KIDNEY DISEASE, WITHOUT LONG-TERM CURRENT USE OF INSULIN: Primary | ICD-10-CM

## 2025-03-14 RX ORDER — BLOOD SUGAR DIAGNOSTIC
STRIP MISCELLANEOUS
Qty: 100 EACH | Refills: 12 | Status: SHIPPED | OUTPATIENT
Start: 2025-03-14

## 2025-03-14 NOTE — TELEPHONE ENCOUNTER
Rx Refill Note  Requested Prescriptions     Pending Prescriptions Disp Refills    glucose blood (OneTouch Ultra Test) test strip 100 each 12     Sig: Use 1 daily to monitor BG E11.42      Last office visit with prescribing clinician: 3/12/2025   Last telemedicine visit with prescribing clinician: Visit date not found   Next office visit with prescribing clinician: 9/15/2025                         Would you like a call back once the refill request has been completed: [] Yes [] No    If the office needs to give you a call back, can they leave a voicemail: [] Yes [] No    Luda Cevallos MA  03/14/25, 09:49 EDT

## 2025-04-04 RX ORDER — FUROSEMIDE 40 MG/1
40 TABLET ORAL DAILY
Qty: 90 TABLET | Refills: 2 | Status: SHIPPED | OUTPATIENT
Start: 2025-04-04

## 2025-05-16 ENCOUNTER — OFFICE VISIT (OUTPATIENT)
Dept: SLEEP MEDICINE | Facility: HOSPITAL | Age: 75
End: 2025-05-16
Payer: MEDICARE

## 2025-05-16 VITALS
WEIGHT: 147 LBS | OXYGEN SATURATION: 92 % | SYSTOLIC BLOOD PRESSURE: 120 MMHG | HEART RATE: 93 BPM | HEIGHT: 58 IN | DIASTOLIC BLOOD PRESSURE: 48 MMHG | BODY MASS INDEX: 30.86 KG/M2

## 2025-05-16 DIAGNOSIS — G47.33 OBSTRUCTIVE SLEEP APNEA, ADULT: Primary | ICD-10-CM

## 2025-05-16 DIAGNOSIS — E66.9 OBESITY (BMI 30-39.9): ICD-10-CM

## 2025-05-16 PROCEDURE — G0463 HOSPITAL OUTPT CLINIC VISIT: HCPCS

## 2025-05-16 NOTE — PROGRESS NOTES
[facility]  Telephone: [phone] / Fax: [phone] [facility]  Telephone: [phone] / Fax: [phone] Kisha Cleary    PCP: Leon Layton MD    Reason for visit: DELIA f/u    Sravanthi Burns is a 74 y.o.female  was last seen at [facility] sleep lab in May 2024. She has questions about inspire today. She reports nocturnal awakening once a week due to poorly fitting mask. She lost 45 lbs since original diagnosis of DELIA was made.  HST in Sep 2020 showed mild DELIA with AHI 9.5/hr, supine 10.5/hr. She is willing to explore CPAP alternative such as OMAD if updated study shows mild DELIA. Her bedtime schedule is 10pm-9am.    SH-no tobacco, no alcohol, 3 caffeine per day.    ROS- +GERD.     DME [facility]    Current Medications:    Current Outpatient Medications:     Acetaminophen (TYLENOL 8 HOUR PO), Take  by mouth As Needed., Disp: , Rfl:     albuterol sulfate  (90 Base) MCG/ACT inhaler, Inhale 1 puff Every 4 (Four) Hours As Needed for Wheezing., Disp: 8 g, Rfl: 0    allopurinol (ZYLOPRIM) 300 MG tablet, Take 1 tablet by mouth Daily., Disp: , Rfl:     atenolol (TENORMIN) 25 MG tablet, Take 1 tablet by mouth Daily., Disp: 90 tablet, Rfl: 2    atorvastatin (LIPITOR) 20 MG tablet, TAKE ONE TABLET BY MOUTH ONCE NIGHTLY, Disp: 90 tablet, Rfl: 2    benzonatate (TESSALON) 200 MG capsule, Take 1 capsule by mouth 3 (Three) Times a Day As Needed for Cough. (Patient not taking: Reported on 3/12/2025), Disp: 90 capsule, Rfl: 0    Cholecalciferol (VITAMIN D3) 5000 units chewable tablet, Chew 5,000 Units Daily., Disp: , Rfl:     empagliflozin (Jardiance) 10 MG tablet tablet, Take 1 tablet by mouth Daily., Disp: , Rfl:     finasteride (PROSCAR) 5 MG tablet, , Disp: , Rfl:     furosemide (LASIX) 40 MG tablet, TAKE 1 TABLET BY MOUTH DAILY, Disp: 90 tablet, Rfl: 2    glucose blood (OneTouch Ultra Test) test strip, Use 1 daily to monitor BG E11.42, Disp: 100 each, Rfl: 12    glucose blood test strip, Use to test  glucose once daily. Dx DM 2 E11.9, Disp: 100 each, Rfl: 11    imipramine (TOFRANIL) 25 MG tablet, TAKE THREE TABLETS BY MOUTH NIGHTLY, Disp: 270 tablet, Rfl: 2    levothyroxine (SYNTHROID, LEVOTHROID) 100 MCG tablet, Take 1 tablet by mouth Daily., Disp: 90 tablet, Rfl: 3    methylPREDNISolone (MEDROL) 4 MG dose pack, Take as directed on package instructions., Disp: 21 tablet, Rfl: 0    multivitamin with minerals (ONE-A-DAY WOMENS PO), Take 1 tablet by mouth Daily., Disp: , Rfl:     Nutritional Supplements (Silica) 12.5 MG capsule, , Disp: , Rfl:     omeprazole (priLOSEC) 40 MG capsule, Take 1 capsule by mouth Daily., Disp: 90 capsule, Rfl: 3    OneTouch Delica Lancets 33G misc, Use 1 daily to monitor BG, Disp: 100 each, Rfl: 3    Ozempic, 2 MG/DOSE, 8 MG/3ML solution pen-injector, DIAL AND INJECT UNDER THE SKIN 2 MG WEEKLY, Disp: 3 mL, Rfl: 5    Peppermint Oil (IBgard) 90 MG capsule controlled-release, , Disp: , Rfl:     Probiotic Product (PROBIOTIC PO), Take  by mouth., Disp: , Rfl:     Probiotic Product capsule, , Disp: , Rfl:     valsartan (DIOVAN) 160 MG tablet, Take 1 tablet by mouth Daily., Disp: 90 tablet, Rfl: 2   also entered in Sleep Questionnaire    Patient  has a past medical history of Anxiety, Arthritis, Cancer (June 2024), Cardiomegaly, Cataract, Cholesterol blood reduced, Chronic kidney disease, Colon polyps, DDD (degenerative disc disease), lumbar, Deep vein thrombosis (2021), Diabetes mellitus (2021), Disease of thyroid gland, Diverticulitis of colon, Diverticulosis, Elevated cholesterol, Excessive sleepiness, Fatty liver, GERD (gastroesophageal reflux disease), Gout, Hard to intubate, History of cardiomegaly, History of leukocytosis, History of vitamin D deficiency, Hot flashes, HPV in female, Hyperlipidemia, Hypertension, Hypothyroidism, Insomnia, Leiomyoma, Low back pain, Lumbar radiculopathy, Lung disease, Obesity, DELIA (obstructive sleep apnea), Panic disorder, Plantar fasciitis, Postmenopausal  "HRT (hormone replacement therapy), Pulmonary nodules, Renal insufficiency, Rosacea, Steatosis of liver, Tachycardia, Type 2 diabetes mellitus (2020), and Visual impairment (TEDS).    I have reviewed the Past Medical History, Past Surgical History, Social History and Family History.    Vital Signs /48   Pulse 93   Ht 147.3 cm (58\")   Wt 66.7 kg (147 lb)   LMP  (LMP Unknown)   SpO2 92%   BMI 30.72 kg/m²  Body mass index is 30.72 kg/m².    General Alert and oriented. No acute distress noted   Pharynx/Throat Class  IV  Mallampati airway, large tongue, no evidence of redundant lateral pharyngeal tissue. No oral lesions. No thrush. Moist mucous membranes.   Head Normocephalic. Symmetrical. Atraumatic.    Nose No septal deviation. No drainage   Chest Wall Normal shape. Symmetric expansion with respiration. No tenderness.   Neck Trachea midline, no thyromegaly or adenopathy    Lungs Clear to auscultation bilaterally. No wheezes. No rhonchi. No rales. Respirations regular, even and unlabored.   Heart Regular rhythm and normal rate. Normal S1 and S2. No murmur   Abdomen Soft, non-tender and non-distended. Normal bowel sounds. No masses.   Extremities Moves all extremities well. No edema   Psychiatric Normal mood and affect.     Testing:  Download 2/14/25-5/14/25 - 82% use with average nightly use of 7 hours and 38 minutes on auto CPAP 5-14cm H2O, avg pressure is 8.6cm H2O, AHI 0.3/hr, leak is 1L/min.    Study-Diagnostic data available to date is as below and was reviewed on current visit:  9/17/20  The patient tolerated the home sleep testing with monitoring time of 622.8 minutes. The data obtained make this a technically adequate study. The apnea hypopneas index(AHI) was 9.5 per sleep hour.  The AHI during supine position was 10.5 per sleep hour. Mean heart rate of 75.4 BPM.  Snoring was noted 26.9% of sleep time. Lowest oxygen saturation during the study was 83%. Saturation below 89% was noted for 3.2 " mins    Impression:  1. Obstructive sleep apnea, adult    2. Obesity (BMI 30-39.9)          Plan:  Repeat HSAT was ordered to re-evaluate DELIA severity in view of weight loss. After study results are available, we can decide if we can DC CPAP or refer patient for OMAD. I explained to Sravanthi the qualifying criteria for inspire. If her DELIA is mild, she will not qualify.     Weight loss will be strongly beneficial to reduce the severity of sleep-disordered breathing.  Caution during activities that require prolonged concentration is strongly advised if sleepiness returns.     Follow up with Dr. Sheriff in one year    Thank you for allowing me to participate in your patient's care.      QAMAR Braga  [facility]  Phone: [phone]      Part of this note may be an electronic transcription/translation of spoken language to printed text using the Dragon Dictation System.

## 2025-05-19 RX ORDER — OMEPRAZOLE 40 MG/1
40 CAPSULE, DELAYED RELEASE ORAL DAILY
Qty: 90 CAPSULE | Refills: 3 | OUTPATIENT
Start: 2025-05-19

## 2025-05-20 RX ORDER — OMEPRAZOLE 40 MG/1
40 CAPSULE, DELAYED RELEASE ORAL DAILY
Qty: 90 CAPSULE | Refills: 3 | OUTPATIENT
Start: 2025-05-20

## 2025-05-27 RX ORDER — ATORVASTATIN CALCIUM 20 MG/1
20 TABLET, FILM COATED ORAL NIGHTLY
Qty: 30 TABLET | Refills: 0 | Status: SHIPPED | OUTPATIENT
Start: 2025-05-27

## 2025-06-02 ENCOUNTER — TELEPHONE (OUTPATIENT)
Dept: GASTROENTEROLOGY | Facility: CLINIC | Age: 75
End: 2025-06-02

## 2025-06-02 NOTE — TELEPHONE ENCOUNTER
Caller: LUCAS BLANCAS    Relationship: SELF    Best call back number: [phone]    Requested Prescriptions: esomeprazole (NEXIUM) 20 MG packet     Pharmacy where request should be sent:    [facility] [address]     Last office visit with prescribing clinician: 3/13/2024   Last telemedicine visit with prescribing clinician: Visit date not found   Next office visit with prescribing clinician: 9.02    Additional details provided by patient: PT IS NOW SCHEDULED AND WANTING TO GET RX REFILLED. PT HAS BEEN OUT FOR ABOUT A WEEK    Does the patient have less than a 3 day supply:  [x] Yes  [] No    Would you like a call back once the refill request has been completed: [x] Yes [] No    If the office needs to give you a call back, can they leave a voicemail: [x] Yes [] No    Kyler An Rep   06/02/25 11:53 EDT

## 2025-06-03 RX ORDER — OMEPRAZOLE 40 MG/1
40 CAPSULE, DELAYED RELEASE ORAL DAILY
Qty: 90 CAPSULE | Refills: 0 | Status: SHIPPED | OUTPATIENT
Start: 2025-06-03

## 2025-06-03 NOTE — TELEPHONE ENCOUNTER
Called pt and pt confirmed that she is requesting refill on omeprazole 40mg po daily.  Pt does have appt made.  Update sent to Dr Araya for refill.

## 2025-06-04 ENCOUNTER — OFFICE VISIT (OUTPATIENT)
Dept: INTERNAL MEDICINE | Facility: CLINIC | Age: 75
End: 2025-06-04
Payer: MEDICARE

## 2025-06-04 VITALS
HEART RATE: 84 BPM | HEIGHT: 58 IN | OXYGEN SATURATION: 98 % | BODY MASS INDEX: 30.27 KG/M2 | DIASTOLIC BLOOD PRESSURE: 55 MMHG | SYSTOLIC BLOOD PRESSURE: 104 MMHG | WEIGHT: 144.2 LBS

## 2025-06-04 DIAGNOSIS — N18.32 TYPE 2 DIABETES MELLITUS WITH STAGE 3B CHRONIC KIDNEY DISEASE, WITHOUT LONG-TERM CURRENT USE OF INSULIN: ICD-10-CM

## 2025-06-04 DIAGNOSIS — I10 PRIMARY HYPERTENSION: ICD-10-CM

## 2025-06-04 DIAGNOSIS — Z78.0 POSTMENOPAUSE: ICD-10-CM

## 2025-06-04 DIAGNOSIS — E78.5 HYPERLIPIDEMIA, UNSPECIFIED HYPERLIPIDEMIA TYPE: ICD-10-CM

## 2025-06-04 DIAGNOSIS — E11.22 TYPE 2 DIABETES MELLITUS WITH STAGE 3B CHRONIC KIDNEY DISEASE, WITHOUT LONG-TERM CURRENT USE OF INSULIN: ICD-10-CM

## 2025-06-04 DIAGNOSIS — E03.9 HYPOTHYROIDISM, UNSPECIFIED TYPE: ICD-10-CM

## 2025-06-04 DIAGNOSIS — L65.9 HAIR LOSS: ICD-10-CM

## 2025-06-04 DIAGNOSIS — Z00.00 MEDICARE ANNUAL WELLNESS VISIT, SUBSEQUENT: Primary | ICD-10-CM

## 2025-06-04 DIAGNOSIS — F41.9 ANXIETY: ICD-10-CM

## 2025-06-04 DIAGNOSIS — M1A.9XX0 CHRONIC GOUT WITHOUT TOPHUS, UNSPECIFIED CAUSE, UNSPECIFIED SITE: ICD-10-CM

## 2025-06-04 RX ORDER — IMIPRAMINE HYDROCHLORIDE 25 MG/1
100 TABLET, FILM COATED ORAL NIGHTLY
Qty: 270 TABLET | Refills: 2 | Status: SHIPPED | OUTPATIENT
Start: 2025-06-04

## 2025-06-04 NOTE — PROGRESS NOTES
Subjective   The ABCs of the Annual Wellness Visit  Medicare Wellness Visit      Sravanthi Burns is a 74 y.o. with CKD, hypothyroidism, GERD, hyperlipidemia, hypertension, history of DVT  patient who presents for a Medicare Wellness Visit.    The following portions of the patient's history were reviewed and   updated as appropriate: allergies, current medications, past family history, past medical history, past social history, past surgical history, and problem list.    Compared to one year ago, the patient's physical   health is better.  Compared to one year ago, the patient's mental   health is the same.    Recent Hospitalizations:  She was not admitted to the hospital during the last year.     Current Medical Providers:  Patient Care Team:  Leon Layton MD as PCP - General (Internal Medicine)  Leon Layton MD as Referring Physician (Internal Medicine)  Kathy Echevarria MD as Consulting Physician (Obstetrics and Gynecology)  Greg Kraft DO as Consulting Physician (Endocrinology)  Beulah Diaz MD as Consulting Physician (Hematology and Oncology)  Ericka Louis MD as Consulting Physician (Dermatology)  Ti Lopez MD as Consulting Physician (Nephrology)  Dr. Lowry (pain)    Outpatient Medications Prior to Visit   Medication Sig Dispense Refill    Acetaminophen (TYLENOL 8 HOUR PO) Take  by mouth As Needed.      allopurinol (ZYLOPRIM) 300 MG tablet Take 1 tablet by mouth Daily.      atorvastatin (LIPITOR) 20 MG tablet TAKE ONE TABLET BY MOUTH ONCE NIGHTLY 30 tablet 0    Cholecalciferol (VITAMIN D3) 5000 units chewable tablet Chew 5,000 Units Daily.      empagliflozin (Jardiance) 10 MG tablet tablet Take 1 tablet by mouth Daily.      finasteride (PROSCAR) 5 MG tablet       furosemide (LASIX) 40 MG tablet TAKE 1 TABLET BY MOUTH DAILY 90 tablet 2    glucose blood (OneTouch Ultra Test) test strip Use 1 daily to monitor BG E11.42 100 each 12    glucose blood test strip Use to test glucose once  daily. Dx DM 2 E11.9 100 each 11    levothyroxine (SYNTHROID, LEVOTHROID) 100 MCG tablet Take 1 tablet by mouth Daily. 90 tablet 3    Nutritional Supplements (Silica) 12.5 MG capsule       omeprazole (priLOSEC) 40 MG capsule Take 1 capsule by mouth Daily. 90 capsule 0    OneTouch Delica Lancets 33G misc Use 1 daily to monitor  each 3    Ozempic, 2 MG/DOSE, 8 MG/3ML solution pen-injector DIAL AND INJECT UNDER THE SKIN 2 MG WEEKLY 3 mL 5    Probiotic Product (PROBIOTIC PO) Take  by mouth.      Probiotic Product capsule       valsartan (DIOVAN) 160 MG tablet Take 1 tablet by mouth Daily. 90 tablet 2    atenolol (TENORMIN) 25 MG tablet Take 1 tablet by mouth Daily. 90 tablet 2    imipramine (TOFRANIL) 25 MG tablet TAKE THREE TABLETS BY MOUTH NIGHTLY 270 tablet 2    albuterol sulfate  (90 Base) MCG/ACT inhaler Inhale 1 puff Every 4 (Four) Hours As Needed for Wheezing. 8 g 0    benzonatate (TESSALON) 200 MG capsule Take 1 capsule by mouth 3 (Three) Times a Day As Needed for Cough. (Patient not taking: Reported on 3/12/2025) 90 capsule 0    methylPREDNISolone (MEDROL) 4 MG dose pack Take as directed on package instructions. 21 tablet 0    multivitamin with minerals (ONE-A-DAY WOMENS PO) Take 1 tablet by mouth Daily.      Peppermint Oil (IBgard) 90 MG capsule controlled-release        No facility-administered medications prior to visit.     No opioid medication identified on active medication list. I have reviewed chart for other potential  high risk medication/s and harmful drug interactions in the elderly.      Aspirin is not on active medication list.  Aspirin use is not indicated based on review of current medical condition/s. Risk of harm outweighs potential benefits.  .    Patient Active Problem List   Diagnosis    Chronic kidney disease    Acquired hypothyroidism    Disease of lung    Gastroesophageal reflux disease    Hyperlipidemia    Hypertension    Obesity due to excess calories with serious  "comorbidity    Panic disorder without agoraphobia    Tachycardia    Morbidly obese    History of colon polyps    Diarrhea    Epigastric pain    Abnormal CT scan, sigmoid colon    Acute kidney injury    Arthropathy of lumbar facet joint    Lumbar spondylosis    Chronic diarrhea    Chronic low back pain    Impingement syndrome of right shoulder region    Lateral epicondylitis of left elbow    Lumbar radiculopathy    Pain of both shoulder joints    Shoulder pain    Stage 3b chronic kidney disease    Subacromial bursitis of left shoulder joint    Subacromial bursitis of right shoulder joint    Type 2 diabetes mellitus with diabetic chronic kidney disease    Type 2 diabetes mellitus without complication, without long-term current use of insulin    Mixed hyperlipidemia    Benign hypertension with chronic kidney disease    Essential hypertension    Obesity    Hepatic steatosis    Bandemia    Malignant neoplasm of overlapping sites of right breast in female, estrogen receptor positive     Advance Care Planning Advance Directive is not on file.  ACP discussion was held with the patient during this visit. Patient has an advance directive (not in EMR), copy requested.            Objective   Vitals:    06/04/25 1058   BP: 104/55   Pulse: 84   SpO2: 98%   Weight: 65.4 kg (144 lb 3.2 oz)   Height: 147.3 cm (57.99\")       Estimated body mass index is 30.15 kg/m² as calculated from the following:    Height as of this encounter: 147.3 cm (57.99\").    Weight as of this encounter: 65.4 kg (144 lb 3.2 oz).                Does the patient have evidence of cognitive impairment? No  Lab Results   Component Value Date    CHLPL 198 06/04/2025    TRIG 291 (H) 06/04/2025    HDL 44 06/04/2025     (H) 06/04/2025    VLDL 50 (H) 06/04/2025    HGBA1C 5.50 03/12/2025                                                                                                Health  Risk Assessment    Smoking Status:  Social History     Tobacco Use "   Smoking Status Former    Current packs/day: 0.00    Average packs/day: 0.5 packs/day for 20.0 years (10.0 ttl pk-yrs)    Types: Cigarettes    Start date: 1972    Quit date: 1992    Years since quittin.4   Smokeless Tobacco Never     Alcohol Consumption:  Social History     Substance and Sexual Activity   Alcohol Use Yes    Comment: Do not drink daily or weekly       Fall Risk Screen  MARLEYADI Fall Risk Assessment was completed, and patient is at LOW risk for falls.Assessment completed on:2025    Depression Screening   Little interest or pleasure in doing things? Not at all   Feeling down, depressed, or hopeless? Not at all   PHQ-2 Total Score 0      Health Habits and Functional and Cognitive Screenin/28/2025    11:31 AM   Functional & Cognitive Status   Do you have difficulty preparing food and eating? No   Do you have difficulty bathing yourself, getting dressed or grooming yourself? No   Do you have difficulty using the toilet? No   Do you have difficulty moving around from place to place? No   Do you have trouble with steps or getting out of a bed or a chair? No   Current Diet Diabetic Diet   Dental Exam Up to date   Eye Exam Up to date   Exercise (times per week) 1 times per week   Current Exercises Include No Regular Exercise   Do you need help using the phone?  No   Are you deaf or do you have serious difficulty hearing?  No   Do you need help to go to places out of walking distance? No   Do you need help shopping? No   Do you need help preparing meals?  No   Do you need help with housework?  No   Do you need help with laundry? No   Do you need help taking your medications? No   Do you need help managing money? No   Do you ever drive or ride in a car without wearing a seat belt? No   Have you felt unusual stress, anger or loneliness in the last month? No   Who do you live with? Spouse   If you need help, do you have trouble finding someone available to you? No   Have you been bothered  in the last four weeks by sexual problems? No   Do you have difficulty concentrating, remembering or making decisions? No           Age-appropriate Screening Schedule:  Refer to the list below for future screening recommendations based on patient's age, sex and/or medical conditions. Orders for these recommended tests are listed in the plan section. The patient has been provided with a written plan.    Health Maintenance List  Health Maintenance   Topic Date Due    TDAP/TD VACCINES (1 - Tdap) Never done    ANNUAL WELLNESS VISIT  03/31/2024    DIABETIC FOOT EXAM  01/25/2025    DXA SCAN  07/06/2025    Pneumococcal Vaccine 50+ (1 of 2 - PCV) 12/01/2025 (Originally 9/26/1969)    ZOSTER VACCINE (1 of 2) 12/01/2025 (Originally 9/26/2002)    COVID-19 Vaccine (3 - 2024-25 season) 12/04/2025 (Originally 9/1/2024)    INFLUENZA VACCINE  07/01/2025    MAMMOGRAM  10/03/2025    HEMOGLOBIN A1C  11/06/2025    DIABETIC EYE EXAM  11/26/2025    URINE MICROALBUMIN-CREATININE RATIO (uACR)  01/21/2026    LIPID PANEL  06/04/2026    COLORECTAL CANCER SCREENING  11/29/2027    HEPATITIS C SCREENING  Completed                                                                                                                                                CMS Preventative Services Quick Reference  Risk Factors Identified During Encounter  Immunizations Discussed/Encouraged: Prevnar 20 (Pneumococcal 20-valent conjugate), Shingrix, and COVID19    The above risks/problems have been discussed with the patient.  Pertinent information has been shared with the patient in the After Visit Summary.  An After Visit Summary and PPPS were made available to the patient.    Follow Up:   Next Medicare Wellness visit to be scheduled in 1 year.         Additional E&M Note during same encounter follows:  Patient has additional, significant, and separately identifiable condition(s)/problem(s) that require work above and beyond the Medicare Wellness Visit     Chief  Complaint  Medicare Wellness-subsequent    Subjective    HPI  LUCAS is also being seen today for additional medical problem/s.       Her rheumatologist retired, and the office no longer has a specialist for gout management. She mentioned that her previous doctor mainly kept track of her uric acid levels and prescribed allopurinol. She is unsure if she needs to find another specialist or if her current provider can continue her allopurinol prescription. Her last uric acid check was about a year ago, and she has a bottle of allopurinol that is fairly full right now.    She uses minoxidil and finasteride for hair loss, which she believes started after getting the COVID-19 vaccine. She prepares the topical solution herself by mixing finasteride pills into the minoxidil. She needs 18 pills a year for this. She prefers the topical route because she had side effects when taking finasteride orally. Her dermatologist, Dr. Ericka Louis, prescribed a topical treatment that she finds too expensive, so she sticks with the minoxidil and finasteride combination, which has been effective.    She continues to see Dr. Kraft for endocrinology and checks her blood sugar daily. She reported a reading of 170 this morning, which she attributed to eating pasta and pizza the day before. Her readings are usually below 150. She is on Ozempic and has lost some weight.    Her blood pressure has been consistently low recently, with occasional lightheadedness and dizziness upon standing, which resolve quickly. She is on atenolol 25 mg, Lasix 40 mg, and valsartan 160 mg. She takes Lasix daily for leg swelling, which is more pronounced at the end of the day. Her kidney function has improved, which she attributes to Jardiance prescribed by Dr. Barraza. She has an appointment with him next week. She doesn't have any heart problems. Last year, her blood pressure dropped to 80/40, leading to a 6-month break from blood pressure medications. It gradually  "normalized afterward.    Her anxiety has worsened since receiving the COVID-19 vaccine, with extreme anxiety upon waking. The anxiety comes and goes but has been more frequent, interfering with her daily activities. She hasn't had a full-fledged panic attack recently but has a history of panic attacks since 1992. She manages panic attacks through self-talk but finds the increasing anxiety challenging. She is considering a medication adjustment. She has experienced side effects from other antidepressants, including sexual dysfunction. She has been on imipramine 75 mg for several years and reports dry mouth, which she attributes to her medications.    She attends [facility], which she finds beneficial.    She underwent reconstruction surgery and is now cancer-free as of the end of October last year. She is not pleased with the reconstruction and plans to discuss this at her upcoming 6-week checkup.    Her weight has been stable between 146 and 148 pounds for the past year. She has been unable to lose additional weight despite dietary efforts and would like to lose about 20 more pounds. She lost some weight with Ozempic but believes most of her weight loss is due to eating less.    She reports knee pain when putting weight on it while getting up from the floor, accompanied by numbness. She hasn't had any previous knee issues or injury. The pain doesn't bother her when walking or climbing stairs.    She had a cough and went to immediate care where one ear was blocked. She used a peroxide and water solution but nothing came out.          Objective   Vital Signs:  /55   Pulse 84   Ht 147.3 cm (57.99\")   Wt 65.4 kg (144 lb 3.2 oz)   SpO2 98%   BMI 30.15 kg/m²   Physical Exam      General Appearance: Normal.  HEENT: Some wax present, no impaction.  Respiratory: Clear to auscultation, no wheezing, rales, or rhonchi.  Cardiovascular: Regular rate and rhythm, no murmurs, rubs, or gallops.  Gastrointestinal: Soft, no " tenderness, no distention, no masses.  Extremities: Crepitus and small effusion in the knee, mild tenderness.  Skin: Warm and dry, no rash.  Neurological: Grossly intact.    The following data was reviewed by: Leon Layton MD on 06/04/2025:    CMP          7/26/2024    08:04 9/12/2024    09:54 10/29/2024    12:54   CMP   Glucose 104   111    BUN 21   21    Creatinine 1.43  1.80  1.52    EGFR 39.3   36.3    Sodium 134   139    Potassium 3.1   3.9    Chloride 98   99    Calcium 10.2   9.5    Total Protein 6.5   6.5    Albumin 3.6   3.8    Globulin 2.9   2.7    Total Bilirubin 0.5   0.6    Alkaline Phosphatase 162   174    AST (SGOT) 22   22    ALT (SGPT) 18   13    Albumin/Globulin Ratio 1.2   1.4    BUN/Creatinine Ratio 14.7   13.8    Anion Gap 12.2   11.0      Lipid Panel          6/4/2025    11:49   Lipid Panel   Total Cholesterol 198    Triglycerides 291    HDL Cholesterol 44    VLDL Cholesterol 50    LDL Cholesterol  104    LDL/HDL Ratio 2.4      TSH          7/8/2024    09:24 8/5/2024    13:27 3/12/2025    09:44   TSH   TSH 0.074  0.197  0.264      A1C Last 3 Results          3/12/2025    09:44   HGBA1C Last 3 Results   Hemoglobin A1C 5.50        Results             Assessment and Plan        Medicare annual wellness visit, subsequent  - Order DEXA scan  - Check cholesterol levels  - Emphasize regular screenings and preventive care       Type 2 diabetes mellitus with stage 3b chronic kidney disease, without long-term current use of insulin  -she is on Ozempic.  Now on Jardiance per nephrology  - A1c 6.2, good control  - Continue current management       Primary hypertension  - Blood pressure 104/55, occasional lightheadedness  - Discontinue atenolol  - Monitor and adjust medications as needed         Chronic gout without tophus, unspecified cause, unspecified site  - Monitor uric acid levels  - Continue allopurinol  Orders:    Uric Acid    Hypothyroidism, unspecified type  -TSH in goal range       Anxiety  on  imipramine long term. Trialed multipme medicaitons in past but had side effects. Takes at night  Anxiety worse after COVID vaccine  Thinks vaccine caused blood clots and hair loss  Worse in the AM  Some agoraphobia  No panic attacks  - Worsening anxiety, especially in the mornings  - Increase imipramine to 100 mg daily  - Discuss potential side effects  - Notify office if dose increase is ineffective  Orders:    imipramine (TOFRANIL) 25 MG tablet; Take 4 tablets by mouth Every Night.    Hyperlipidemia, unspecified hyperlipidemia type   on atorvastatin  Orders:    Lipid Panel With LDL / HDL Ratio    Hair loss  Advised dermatology follow up  - Conduct iron studies  - Patient uses minoxidil and finasteride topically  - Discuss off-label use of finasteride with dermatologist  Orders:    Iron and TIBC    Ferritin    Postmenopause    Orders:    DEXA Bone Density Axial; Future                    Follow Up   Return in about 3 months (around 9/4/2025) for Recheck.  Patient was given instructions and counseling regarding her condition or for health maintenance advice. Please see specific information pulled into the AVS if appropriate.  Patient or patient representative verbalized consent for the use of Ambient Listening during the visit with  Leon Layton MD for chart documentation. 6/5/2025  07:57 EDT

## 2025-06-04 NOTE — ASSESSMENT & PLAN NOTE
-she is on Ozempic.  Now on Jardiance per nephrology  - A1c 6.2, good control  - Continue current management

## 2025-06-04 NOTE — ASSESSMENT & PLAN NOTE
- Blood pressure 104/55, occasional lightheadedness  - Discontinue atenolol  - Monitor and adjust medications as needed

## 2025-06-05 LAB
CHOLEST SERPL-MCNC: 198 MG/DL (ref 100–199)
FERRITIN SERPL-MCNC: 89 NG/ML (ref 15–150)
HDLC SERPL-MCNC: 44 MG/DL
IRON SATN MFR SERPL: 40 % (ref 15–55)
IRON SERPL-MCNC: 133 UG/DL (ref 27–139)
LDLC SERPL CALC-MCNC: 104 MG/DL (ref 0–99)
LDLC/HDLC SERPL: 2.4 RATIO (ref 0–3.2)
TIBC SERPL-MCNC: 330 UG/DL (ref 250–450)
TRIGL SERPL-MCNC: 291 MG/DL (ref 0–149)
UIBC SERPL-MCNC: 197 UG/DL (ref 118–369)
URATE SERPL-MCNC: 3.2 MG/DL (ref 3.1–7.9)
VLDLC SERPL CALC-MCNC: 50 MG/DL (ref 5–40)

## 2025-06-05 RX ORDER — FINASTERIDE 5 MG/1
TABLET, FILM COATED ORAL
OUTPATIENT
Start: 2025-06-05

## 2025-06-17 ENCOUNTER — HOSPITAL ENCOUNTER (OUTPATIENT)
Dept: SLEEP MEDICINE | Facility: HOSPITAL | Age: 75
Discharge: HOME | End: 2025-06-17
Admitting: NURSE PRACTITIONER
Payer: MEDICARE

## 2025-06-17 DIAGNOSIS — E66.9 OBESITY (BMI 30-39.9): ICD-10-CM

## 2025-06-17 DIAGNOSIS — G47.33 OBSTRUCTIVE SLEEP APNEA, ADULT: ICD-10-CM

## 2025-06-17 PROCEDURE — G0399 HOME SLEEP TEST/TYPE 3 PORTA: HCPCS

## 2025-06-24 ENCOUNTER — TELEPHONE (OUTPATIENT)
Dept: SLEEP MEDICINE | Facility: HOSPITAL | Age: 75
End: 2025-06-24
Payer: MEDICARE

## 2025-06-24 DIAGNOSIS — G47.33 OBSTRUCTIVE SLEEP APNEA, ADULT: Primary | ICD-10-CM

## 2025-06-24 RX ORDER — ATORVASTATIN CALCIUM 20 MG/1
20 TABLET, FILM COATED ORAL NIGHTLY
Qty: 90 TABLET | Refills: 2 | Status: SHIPPED | OUTPATIENT
Start: 2025-06-24

## 2025-07-01 DIAGNOSIS — E03.9 HYPOTHYROIDISM, UNSPECIFIED TYPE: ICD-10-CM

## 2025-07-01 RX ORDER — LEVOTHYROXINE SODIUM 100 UG/1
100 TABLET ORAL DAILY
Qty: 90 TABLET | Refills: 1 | Status: SHIPPED | OUTPATIENT
Start: 2025-07-01

## 2025-07-01 NOTE — TELEPHONE ENCOUNTER
Rx Refill Note  Requested Prescriptions     Pending Prescriptions Disp Refills    levothyroxine (SYNTHROID, LEVOTHROID) 100 MCG tablet [Pharmacy Med Name: LEVOTHYROXINE 100 MCG TABLET] 90 tablet 3     Sig: TAKE 1 TABLET BY MOUTH DAILY      Last office visit with prescribing clinician: 3/12/2025   Last telemedicine visit with prescribing clinician: Visit date not found   Next office visit with prescribing clinician: 9/15/2025                         Would you like a call back once the refill request has been completed: [] Yes [] No    If the office needs to give you a call back, can they leave a voicemail: [] Yes [] No    Lucy Cevallos  07/01/25, 07:40 EDT  Lucy Cevallos  7/1/2025  07:40 EDT

## 2025-07-03 ENCOUNTER — TELEPHONE (OUTPATIENT)
Dept: GASTROENTEROLOGY | Facility: CLINIC | Age: 75
End: 2025-07-03
Payer: MEDICARE

## 2025-07-07 ENCOUNTER — HOSPITAL ENCOUNTER (OUTPATIENT)
Dept: BONE DENSITY | Facility: HOSPITAL | Age: 75
Discharge: HOME | End: 2025-07-07
Admitting: STUDENT IN AN ORGANIZED HEALTH CARE EDUCATION/TRAINING PROGRAM
Payer: MEDICARE

## 2025-07-07 DIAGNOSIS — Z78.0 POSTMENOPAUSE: ICD-10-CM

## 2025-07-07 PROCEDURE — 77080 DXA BONE DENSITY AXIAL: CPT

## 2025-07-11 DIAGNOSIS — N18.4 TYPE 2 DIABETES MELLITUS WITH STAGE 4 CHRONIC KIDNEY DISEASE, WITHOUT LONG-TERM CURRENT USE OF INSULIN: Chronic | ICD-10-CM

## 2025-07-11 DIAGNOSIS — E11.22 TYPE 2 DIABETES MELLITUS WITH STAGE 4 CHRONIC KIDNEY DISEASE, WITHOUT LONG-TERM CURRENT USE OF INSULIN: Chronic | ICD-10-CM

## 2025-07-11 RX ORDER — SEMAGLUTIDE 2.68 MG/ML
INJECTION, SOLUTION SUBCUTANEOUS
Qty: 3 ML | Refills: 3 | Status: SHIPPED | OUTPATIENT
Start: 2025-07-11

## 2025-07-11 NOTE — TELEPHONE ENCOUNTER
Rx Refill Note  Requested Prescriptions     Pending Prescriptions Disp Refills    Ozempic, 2 MG/DOSE, 8 MG/3ML solution pen-injector [Pharmacy Med Name: OZEMPIC 2 MG/DOSE (8 MG/3 ML)] 3 mL 5     Sig: DIAL AND INJECT UNDER THE SKIN 2 MG WEEKLY      Last office visit with prescribing clinician: 3/12/2025   Last telemedicine visit with prescribing clinician: Visit date not found   Next office visit with prescribing clinician: 9/15/2025                         Would you like a call back once the refill request has been completed: [] Yes [] No    If the office needs to give you a call back, can they leave a voicemail: [] Yes [] No  Luda Cevallos MA  7/11/2025  07:39 EDT

## 2025-07-22 ENCOUNTER — DOCUMENTATION (OUTPATIENT)
Dept: SLEEP MEDICINE | Facility: HOSPITAL | Age: 75
End: 2025-07-22
Payer: MEDICARE

## 2025-07-22 NOTE — PROGRESS NOTES
Inform patient that her overnight oximetry on room air is okay.  There is no significant desaturation.  Therefore the results of the HST with regards to sleep hypoxia were probably an artifact.  She can discontinue use of CPAP if she likes and cancel any follow-up.    Electronically signed by Rei Sheriff MD, 07/22/25, 4:21 PM EDT.

## 2025-07-23 ENCOUNTER — TELEPHONE (OUTPATIENT)
Dept: SLEEP MEDICINE | Facility: HOSPITAL | Age: 75
End: 2025-07-23
Payer: MEDICARE

## 2025-08-04 RX ORDER — ALLOPURINOL 300 MG/1
300 TABLET ORAL DAILY
Qty: 90 TABLET | Refills: 2 | Status: SHIPPED | OUTPATIENT
Start: 2025-08-04

## (undated) DEVICE — THE TORRENT IRRIGATION SCOPE CONNECTOR IS USED WITH THE TORRENT IRRIGATION TUBING TO PROVIDE IRRIGATION FLUIDS SUCH AS STERILE WATER DURING GASTROINTESTINAL ENDOSCOPIC PROCEDURES WHEN USED IN CONJUNCTION WITH AN IRRIGATION PUMP (OR ELECTROSURGICAL UNIT).: Brand: TORRENT

## (undated) DEVICE — ADAPT CLN BIOGUARD AIR/H2O DISP

## (undated) DEVICE — SENSR O2 OXIMAX FNGR A/ 18IN NONSTR

## (undated) DEVICE — LN SMPL CO2 SHTRM SD STREAM W/M LUER

## (undated) DEVICE — BITEBLOCK OMNI BLOC

## (undated) DEVICE — KT VLV BIOGUARD SXN BIOP AIR/H20 CONN 4PC DISP

## (undated) DEVICE — CANN NASL CO2 TRULINK W/O2 A/

## (undated) DEVICE — MSK PROC CURAPLEX O2 2/ADAPT 7FT

## (undated) DEVICE — KT ORCA ORCAPOD DISP STRL

## (undated) DEVICE — TUBING, SUCTION, 1/4" X 10', STRAIGHT: Brand: MEDLINE

## (undated) DEVICE — FRCP BX RADJAW4 NDL 2.8 240CM LG OG BX40

## (undated) DEVICE — SINGLE-USE BIOPSY FORCEPS: Brand: RADIAL JAW 4